# Patient Record
Sex: MALE | Race: WHITE | Employment: OTHER | URBAN - NONMETROPOLITAN AREA
[De-identification: names, ages, dates, MRNs, and addresses within clinical notes are randomized per-mention and may not be internally consistent; named-entity substitution may affect disease eponyms.]

---

## 2021-03-25 ENCOUNTER — OFFICE VISIT (OUTPATIENT)
Dept: FAMILY MEDICINE CLINIC | Age: 51
End: 2021-03-25
Payer: COMMERCIAL

## 2021-03-25 VITALS
OXYGEN SATURATION: 98 % | RESPIRATION RATE: 18 BRPM | BODY MASS INDEX: 25.48 KG/M2 | HEIGHT: 70 IN | DIASTOLIC BLOOD PRESSURE: 86 MMHG | WEIGHT: 178 LBS | TEMPERATURE: 98.4 F | SYSTOLIC BLOOD PRESSURE: 186 MMHG | HEART RATE: 88 BPM

## 2021-03-25 DIAGNOSIS — R39.11 URINARY HESITANCY: ICD-10-CM

## 2021-03-25 DIAGNOSIS — Z13.220 SCREENING CHOLESTEROL LEVEL: ICD-10-CM

## 2021-03-25 DIAGNOSIS — I10 HYPERTENSION, UNSPECIFIED TYPE: ICD-10-CM

## 2021-03-25 DIAGNOSIS — Z12.11 COLON CANCER SCREENING: ICD-10-CM

## 2021-03-25 DIAGNOSIS — Z11.59 NEED FOR HEPATITIS C SCREENING TEST: ICD-10-CM

## 2021-03-25 DIAGNOSIS — I63.89 AC CEREBRAL INFARCTION W/ ISCHEMIA (HCC): Primary | ICD-10-CM

## 2021-03-25 DIAGNOSIS — Z72.0 TOBACCO ABUSE: ICD-10-CM

## 2021-03-25 DIAGNOSIS — Z11.4 SCREENING FOR HIV (HUMAN IMMUNODEFICIENCY VIRUS): ICD-10-CM

## 2021-03-25 DIAGNOSIS — R29.898 WEAKNESS OF LEFT LOWER EXTREMITY: ICD-10-CM

## 2021-03-25 DIAGNOSIS — Z76.89 ENCOUNTER TO ESTABLISH CARE: ICD-10-CM

## 2021-03-25 DIAGNOSIS — R29.898 WEAKNESS OF LEFT UPPER EXTREMITY: ICD-10-CM

## 2021-03-25 PROCEDURE — 36415 COLL VENOUS BLD VENIPUNCTURE: CPT | Performed by: NURSE PRACTITIONER

## 2021-03-25 PROCEDURE — 1111F DSCHRG MED/CURRENT MED MERGE: CPT | Performed by: NURSE PRACTITIONER

## 2021-03-25 PROCEDURE — 99496 TRANSJ CARE MGMT HIGH F2F 7D: CPT | Performed by: NURSE PRACTITIONER

## 2021-03-25 RX ORDER — ASPIRIN 325 MG
TABLET ORAL
Qty: 30 TABLET | Refills: 5 | Status: SHIPPED | OUTPATIENT
Start: 2021-03-25 | End: 2021-04-08 | Stop reason: SDUPTHER

## 2021-03-25 RX ORDER — VARENICLINE TARTRATE 0.5 MG/1
.5-1 TABLET, FILM COATED ORAL SEE ADMIN INSTRUCTIONS
Qty: 57 TABLET | Refills: 0 | Status: SHIPPED | OUTPATIENT
Start: 2021-03-25 | End: 2021-07-15

## 2021-03-25 RX ORDER — VARENICLINE TARTRATE 0.5 MG/1
.5-1 TABLET, FILM COATED ORAL SEE ADMIN INSTRUCTIONS
Qty: 57 TABLET | Refills: 0 | Status: CANCELLED | OUTPATIENT
Start: 2021-03-25

## 2021-03-25 RX ORDER — NIFEDIPINE 60 MG/1
TABLET, EXTENDED RELEASE ORAL
COMMUNITY
Start: 2021-03-20 | End: 2021-04-08

## 2021-03-25 RX ORDER — ATORVASTATIN CALCIUM 40 MG/1
TABLET, FILM COATED ORAL
COMMUNITY
Start: 2021-03-20 | End: 2021-04-08 | Stop reason: SDUPTHER

## 2021-03-25 RX ORDER — ASPIRIN 325 MG/1
325 TABLET, COATED ORAL DAILY
Qty: 30 TABLET | Refills: 5 | Status: CANCELLED | OUTPATIENT
Start: 2021-03-25

## 2021-03-25 RX ORDER — ASPIRIN 325 MG/1
TABLET, COATED ORAL
COMMUNITY
Start: 2021-03-20 | End: 2021-03-25 | Stop reason: SDUPTHER

## 2021-03-25 RX ORDER — ATORVASTATIN CALCIUM 40 MG/1
40 TABLET, FILM COATED ORAL DAILY
Qty: 30 TABLET | Refills: 5 | Status: CANCELLED | OUTPATIENT
Start: 2021-03-25 | End: 2021-04-24

## 2021-03-25 RX ORDER — LISINOPRIL 20 MG/1
20 TABLET ORAL DAILY
Qty: 30 TABLET | Refills: 5 | Status: SHIPPED | OUTPATIENT
Start: 2021-03-25 | End: 2021-03-29

## 2021-03-25 ASSESSMENT — PATIENT HEALTH QUESTIONNAIRE - PHQ9
2. FEELING DOWN, DEPRESSED OR HOPELESS: 0
SUM OF ALL RESPONSES TO PHQ QUESTIONS 1-9: 0

## 2021-03-25 NOTE — PROGRESS NOTES
Post-Discharge Transitional Care Management Services or Hospital Follow Up      Jo-Ann Edwards   YOB: 1970    Date of Office Visit:  3/25/2021  Date of Hospital Admission: 3/18/21  Date of Hospital Discharge: 3/20/21    Care management risk score Rising risk (score 2-5) and Complex Care (Scores >=6): 0     Non face to face  following discharge, date last encounter closed (first attempt may have been earlier): *No documented post hospital discharge outreach found in the last 14 days     Call initiated 2 business days of discharge: *No response recorded in the last 14 days    Patient Active Problem List   Diagnosis    Ac cerebral infarction w/ ischemia ([facility] [address]    Hypertension    Tobacco abuse    Weakness of left lower extremity       No Known Allergies    Medications listed as ordered at the time of discharge from hospital  reviewed     Medications marked \"taking\" at this time  Outpatient Medications Marked as Taking for the 3/25/21 encounter (Office Visit) with Josue Jacobson, APRN - CNP   Medication Sig Dispense Refill    atorvastatin (LIPITOR) 40 MG tablet TAKE 1 TABLET BY MOUTH AT BEDTIME FOR 30 DAYS      NIFEdipine (PROCARDIA XL) 60 MG extended release tablet TAKE 1 TABLET BY MOUTH ONCE DAILY      varenicline (CHANTIX) 0.5 MG tablet Take 1-2 tablets by mouth See Admin Instructions 0.5mg DAILY for 3 days followed by 0.5mg TWICE DAILY for 4 days followed by 1mg TWICE DAILY 57 tablet 0    aspirin (EQ ASPIRIN) 325 MG tablet TAKE 1 TABLET BY MOUTH ONCE DAILY 30 tablet 5    Handicap Placard MISC by Does not apply route Expires in 5 years 1 each 0    [DISCONTINUED] lisinopril (PRINIVIL;ZESTRIL) 20 MG tablet Take 1 tablet by mouth daily 30 tablet 5        Medications patient taking as of now reconciled against medications ordered at time of hospital discharge: Yes    Chief Complaint   Patient presents with   D[facility] Doctor    [facility]- stroke 3/13/21 cough, chest tightness, shortness of breath and wheezing. Cardiovascular: Negative for chest pain, palpitations and leg swelling. Gastrointestinal: Negative for abdominal distention, abdominal pain, blood in stool, constipation, diarrhea, nausea, rectal pain and vomiting. Endocrine: Negative for cold intolerance and heat intolerance. Genitourinary: Negative for difficulty urinating, dysuria, frequency, hematuria, penile pain, scrotal swelling, testicular pain and urgency. Urinary hesitancy     Musculoskeletal: Positive for myalgias. Negative for arthralgias, back pain, gait problem and neck pain. Left leg weakness and left arm weakness. Skin: Negative for color change, rash and wound. Neurological: Negative for dizziness, seizures, weakness, light-headedness, numbness and headaches. Psychiatric/Behavioral: Negative for agitation and sleep disturbance. The patient is not nervous/anxious. Physical Exam  Vitals signs and nursing note reviewed. Constitutional:       General: He is not in acute distress. Appearance: Normal appearance. He is well-developed. He is not ill-appearing or diaphoretic. HENT:      Head: Normocephalic and atraumatic. Right Ear: Tympanic membrane and external ear normal. Tympanic membrane is not injected or erythematous. Left Ear: Tympanic membrane and external ear normal. Tympanic membrane is not injected or erythematous. Nose: Nose normal.      Mouth/Throat:      Mouth: Mucous membranes are moist.      Pharynx: Oropharynx is clear. Uvula midline. No oropharyngeal exudate or posterior oropharyngeal erythema. Eyes:      General:         Right eye: No discharge. Left eye: No discharge. Conjunctiva/sclera: Conjunctivae normal.      Pupils: Pupils are equal, round, and reactive to light. Neck:      Musculoskeletal: Full passive range of motion without pain, normal range of motion and neck supple.  No neck rigidity or muscular tenderness. Thyroid: No thyromegaly. Trachea: Trachea normal.   Cardiovascular:      Rate and Rhythm: Normal rate and regular rhythm. Pulses: Normal pulses. Heart sounds: Normal heart sounds, S1 normal and S2 normal. No murmur. No friction rub. No gallop. Pulmonary:      Effort: Pulmonary effort is normal. No respiratory distress. Breath sounds: Normal breath sounds. No wheezing or rales. Chest:      Chest wall: No tenderness. Abdominal:      General: Bowel sounds are normal. There is no distension. Palpations: Abdomen is soft. There is no mass. Tenderness: There is no abdominal tenderness. There is no guarding or rebound. Musculoskeletal: Normal range of motion. General: No swelling, tenderness or deformity. Lymphadenopathy:      Cervical: No cervical adenopathy. Skin:     General: Skin is warm and dry. Capillary Refill: Capillary refill takes less than 2 seconds. Neurological:      General: No focal deficit present. Mental Status: He is alert and oriented to person, place, and time. Motor: Weakness present. Coordination: Coordination is intact. Coordination normal.      Gait: Gait is intact. Deep Tendon Reflexes: Reflexes are normal and symmetric. Comments: Strength weakness to left arm and leg   Psychiatric:         Mood and Affect: Mood normal.         Behavior: Behavior normal.         Thought Content: Thought content normal.               Assessment/Plan:  1. Encounter to establish care      2. Ac cerebral infarction w/ ischemia (HCC)  On Lipitor, Asa.   - [place] DISCHARGE MEDS RECONCILED W/ CURRENT OUTPATIENT MED LIST  - External Referral To Physical Therapy  - External Referral To Occupational Therapy  - Handicap Placard [facility] by Does not apply route Expires in 5 years  Dispense: 1 each; Refill: 0    3.  Weakness of left lower extremity  Referral placed to PT/OT  - HI DISCHARGE MEDS RECONCILED W/ CURRENT OUTPATIENT MED LIST 4. Weakness of left upper extremity  Referral placed to PT/OT  - FL DISCHARGE MEDS RECONCILED W/ CURRENT OUTPATIENT MED LIST    5. Hypertension, unspecified type  Not controlled. 186/86 currently on Nifedipine 60 mg, adding Lisinopril 20 mg. Encouraged to quit smoking.   - FL DISCHARGE MEDS RECONCILED W/ CURRENT OUTPATIENT MED LIST    6. Tobacco abuse  Patient agreeable to try chantix. - FL DISCHARGE MEDS RECONCILED W/ CURRENT OUTPATIENT MED LIST  - varenicline (CHANTIX) 0.5 MG tablet; Take 1-2 tablets by mouth See Admin Instructions 0.5mg DAILY for 3 days followed by 0.5mg TWICE DAILY for 4 days followed by 1mg TWICE DAILY  Dispense: 57 tablet; Refill: 0    7. Colon cancer screening  - Cologuard (For External Results Only); Future    8. Screening for HIV (human immunodeficiency virus)  - HIV-1 and HIV-2 Antibodies; Future  - HIV-1 and HIV-2 Antibodies    9. Need for hepatitis C screening test  - Hepatitis C Antibody; Future  - Hepatitis C Antibody    10. Urinary hesitancy  - FL DISCHARGE MEDS RECONCILED W/ CURRENT OUTPATIENT MED LIST  - PSA Prostatic Specific Antigen; Future  - PSA Prostatic Specific Antigen    11. Screening cholesterol level  - Lipid, Fasting;  Future  - Lipid, Fasting        Medical Decision Making: moderate complexity

## 2021-03-25 NOTE — PATIENT INSTRUCTIONS
Patient Education      Adding Lisinopril 20 mg daily   High Blood Pressure: Care Instructions  Overview     It's normal for blood pressure to go up and down throughout the day. But if it stays up, you have high blood pressure. Another name for high blood pressure is hypertension. Despite what a lot of people think, high blood pressure usually doesn't cause headaches or make you feel dizzy or lightheaded. It usually has no symptoms. But it does increase your risk of stroke, heart attack, and other problems. You and your doctor will talk about your risks of these problems based on your blood pressure. Your doctor will give you a goal for your blood pressure. Your goal will be based on your health and your age. Lifestyle changes, such as eating healthy and being active, are always important to help lower blood pressure. You might also take medicine to reach your blood pressure goal.  Follow-up care is a key part of your treatment and safety. Be sure to make and go to all appointments, and call your doctor if you are having problems. It's also a good idea to know your test results and keep a list of the medicines you take. How can you care for yourself at home? Medical treatment  · If you stop taking your medicine, your blood pressure will go back up. You may take one or more types of medicine to lower your blood pressure. Be safe with medicines. Take your medicine exactly as prescribed. Call your doctor if you think you are having a problem with your medicine. · Talk to your doctor before you start taking aspirin every day. Aspirin can help certain people lower their risk of a heart attack or stroke. But taking aspirin isn't right for everyone, because it can cause serious bleeding. · See your doctor regularly. You may need to see the doctor more often at first or until your blood pressure comes down.   · If you are taking blood pressure medicine, talk to your doctor before you take decongestants or anti-inflammatory medicine, such as ibuprofen. Some of these medicines can raise blood pressure. · Learn how to check your blood pressure at home. Lifestyle changes  · Stay at a healthy weight. This is especially important if you put on weight around the waist. Losing even 10 pounds can help you lower your blood pressure. · If your doctor recommends it, get more exercise. Walking is a good choice. Bit by bit, increase the amount you walk every day. Try for at least 30 minutes on most days of the week. You also may want to swim, bike, or do other activities. · Avoid or limit alcohol. Talk to your doctor about whether you can drink any alcohol. · Try to limit how much sodium you eat to less than 2,300 milligrams (mg) a day. Your doctor may ask you to try to eat less than 1,500 mg a day. · Eat plenty of fruits (such as bananas and oranges), vegetables, legumes, whole grains, and low-fat dairy products. · Lower the amount of saturated fat in your diet. Saturated fat is found in animal products such as milk, cheese, and meat. Limiting these foods may help you lose weight and also lower your risk for heart disease. · Do not smoke. Smoking increases your risk for heart attack and stroke. If you need help quitting, talk to your doctor about stop-smoking programs and medicines. These can increase your chances of quitting for good. When should you call for help? Call  911 anytime you think you may need emergency care. This may mean having symptoms that suggest that your blood pressure is causing a serious heart or blood vessel problem. Your blood pressure may be over 180/120. For example, call 911 if:    · You have symptoms of a heart attack. These may include:  ? Chest pain or pressure, or a strange feeling in the chest.  ? Sweating. ? Shortness of breath. ? Nausea or vomiting. ? Pain, pressure, or a strange feeling in the back, neck, jaw, or upper belly or in one or both shoulders or arms.   ? Lightheadedness pressure? Blood pressure is a measure of how hard the blood pushes against the walls of your arteries. It's normal for blood pressure to go up and down throughout the day. But if it stays up, you have high blood pressure. Another name for high blood pressure is hypertension. Two numbers tell you your blood pressure. The first number is the systolic pressure (top number). It shows how hard the blood pushes when your heart is pumping. The second number is the diastolic pressure (bottom number). It shows how hard the blood pushes between heartbeats, when your heart is relaxed and filling with blood. Your doctor will give you a goal for your blood pressure based on your health and your age. High blood pressure (hypertension) means that the top number stays high, or the bottom number stays high, or both. High blood pressure increases the risk of stroke, heart attack, and other problems. What happens when you have high blood pressure? · Blood flows through your arteries with too much force. Over time, this can damage the heart and the walls of your arteries. But you can't feel it. High blood pressure usually doesn't cause symptoms. · High blood pressure makes your heart work harder. And that can lead to heart failure, which means your heart doesn't pump as much blood as your body needs. · Fat and calcium start to build up in your arteries. This buildup is called hardening of the arteries. It can cause many problems including a heart attack and stroke. · Arteries also carry blood and oxygen to organs like your eyes, kidneys, and brain. If high blood pressure damages those arteries, it can lead to vision loss, kidney disease, stroke, and a higher risk of dementia. How can you prevent high blood pressure? · Stay at a healthy weight. · Try to limit how much sodium you eat to less than 2,300 milligrams (mg) a day. If you limit your sodium to 1,500 mg a day, you can lower your blood pressure even more.   ? Buy foods that are labeled \"unsalted,\" \"sodium-free,\" or \"low-sodium. \" Foods labeled \"reduced-sodium\" and \"light sodium\" may still have too much sodium. ? Flavor your food with garlic, lemon juice, onion, vinegar, herbs, and spices instead of salt. Do not use soy sauce, steak sauce, onion salt, garlic salt, mustard, or ketchup on your food. ? Use less salt (or none) when recipes call for it. You can often use half the salt a recipe calls for without losing flavor. · Be physically active. Get at least 30 minutes of exercise on most days of the week. Walking is a good choice. You also may want to do other activities, such as running, swimming, cycling, or playing tennis or team sports. · Limit alcohol to 2 drinks a day for men and 1 drink a day for women. · Eat plenty of fruits, vegetables, and low-fat dairy products. Eat less saturated and total fats. How is high blood pressure treated? · Your doctor will suggest making lifestyle changes to help your heart. For example, your doctor may ask you to eat healthy foods, quit smoking, lose extra weight, and be more active. · If lifestyle changes don't help enough, your doctor may recommend that you take medicine. · When blood pressure is very high, medicines are needed to lower it. Follow-up care is a key part of your treatment and safety. Be sure to make and go to all appointments, and call your doctor if you are having problems. It's also a good idea to know your test results and keep a list of the medicines you take. Where can you learn more? Go to https://disco volantepepiceweb.[employer]. org and sign in to your [employer] account. Enter P501 in the [facility] box to learn more about \"Learning About High Blood Pressure. \"     If you do not have an account, please click on the \"Sign Up Now\" link. Current as of: August 31, 2020               Content Version: 12.8  © 4427-8698 Healthwise, Incorporated.    Care instructions adapted under license by [facility]. If you have questions about a medical condition or this instruction, always ask your healthcare professional. [place] [address] any warranty or liability for your use of this information.

## 2021-03-26 LAB
CHOLESTEROL, FASTING: 111 MG/DL (ref 100–199)
HDLC SERPL-MCNC: 50 MG/DL
HEPATITIS C ANTIBODY: NEGATIVE
LDL CHOLESTEROL CALCULATED: 51 MG/DL
PROSTATE SPECIFIC ANTIGEN: 0.66 NG/ML (ref 0–1)
TRIGLYCERIDE, FASTING: 51 MG/DL (ref 0–199)

## 2021-03-27 LAB — HIV AG/AB: NONREACTIVE

## 2021-03-29 ENCOUNTER — TELEPHONE (OUTPATIENT)
Dept: FAMILY MEDICINE CLINIC | Age: 51
End: 2021-03-29

## 2021-03-29 DIAGNOSIS — I10 HYPERTENSION, UNSPECIFIED TYPE: ICD-10-CM

## 2021-03-29 DIAGNOSIS — K59.00 CONSTIPATION, UNSPECIFIED CONSTIPATION TYPE: Primary | ICD-10-CM

## 2021-03-29 RX ORDER — POLYETHYLENE GLYCOL 3350 17 G/17G
17 POWDER, FOR SOLUTION ORAL DAILY PRN
Qty: 527 G | Refills: 1 | Status: SHIPPED | OUTPATIENT
Start: 2021-03-29 | End: 2021-04-14

## 2021-03-29 RX ORDER — LISINOPRIL 40 MG/1
40 TABLET ORAL DAILY
Qty: 30 TABLET | Refills: 5 | Status: SHIPPED | OUTPATIENT
Start: 2021-03-29 | End: 2021-04-08 | Stop reason: SDUPTHER

## 2021-03-29 NOTE — TELEPHONE ENCOUNTER
Patient notified and voiced understanding- he states that he will have to have meds sent to mail in but he is not sure which one- will let us know

## 2021-03-29 NOTE — TELEPHONE ENCOUNTER
Regarding his blood pressures. I am wanting to increase his Lisinopril from 20 to 40 mg. He has 20 mg tablets at home. He can use those up and take two of those at once to equal 40 mg.   I am also sending a new Rx for the 40 mg.

## 2021-03-29 NOTE — TELEPHONE ENCOUNTER
Had small hard walnut size BM yesterday morning- advised of miralax Rx being sent in- advised to call with abdominal pain or any worsening symptoms voiced understanding - will call Thursday with an update

## 2021-03-29 NOTE — TELEPHONE ENCOUNTER
Spoke with the patient who states he would like to try a medication for constipation. The patient would like it sent to [facility] in [place].  Please advise  [phone]

## 2021-03-29 NOTE — TELEPHONE ENCOUNTER
Recent BP readings     3/25/21  7:43 179/92 pulse 91                2:24 175/91 pulse 88    3/26/21  12:24 am 142/74 pulse 86                8:58 am 187/93 pulse 90                11:06 pm 156/79 pulse       3/27/21   7:30 am  166/86 pulse 88                 2:49 pm  176/91 pulse 82                 11:30 pm  149/77 pulse 78      3/28/21  9:18 am 172/82  Pulse 94                3:20 pm 152/101  Pulse 82                 9:59 pm 159/84  Pulse 88    3/29/21 9:08 am  179/87  Pulse 87                12 midnight 189/98  Pulse 85

## 2021-03-29 NOTE — RESULT ENCOUNTER NOTE
Please notify Kanika Herrera that his labs all looked good. Also please ask him what his blood pressure has been over the last few days since we added the additional medication.

## 2021-03-29 NOTE — TELEPHONE ENCOUNTER
When was his last BM? Is he passing gas? Having any type BM, small hard? I sent an Rx for MiraLax, but can do something stronger if needed.

## 2021-03-30 ENCOUNTER — TELEPHONE (OUTPATIENT)
Dept: FAMILY MEDICINE CLINIC | Age: 51
End: 2021-03-30

## 2021-03-30 NOTE — TELEPHONE ENCOUNTER
NWPT at [facility] called, they do not have OT in [place].  Pt does not want to travel to [place] or [facility] [place] and refused the referral. Pt has follow up apt on 04/08/21

## 2021-03-31 ASSESSMENT — ENCOUNTER SYMPTOMS
CHEST TIGHTNESS: 0
EYE PAIN: 0
FACIAL SWELLING: 0
SINUS PRESSURE: 0
SHORTNESS OF BREATH: 0
SINUS PAIN: 0
SORE THROAT: 0
DIARRHEA: 0
EYE DISCHARGE: 0
EYE REDNESS: 0
RECTAL PAIN: 0
NAUSEA: 0
TROUBLE SWALLOWING: 0
COLOR CHANGE: 0
BACK PAIN: 0
WHEEZING: 0
ABDOMINAL PAIN: 0
CONSTIPATION: 0
BLOOD IN STOOL: 0
VOMITING: 0
COUGH: 0
ABDOMINAL DISTENTION: 0
EYE ITCHING: 0

## 2021-04-01 ENCOUNTER — TELEPHONE (OUTPATIENT)
Dept: FAMILY MEDICINE CLINIC | Age: 51
End: 2021-04-01

## 2021-04-05 NOTE — TELEPHONE ENCOUNTER
Patient will email his BP readings. Patient stated he has had very little BM. Stated he is coming in for appointment on Thursday. Denied any other questions or concerns at this time.

## 2021-04-08 ENCOUNTER — OFFICE VISIT (OUTPATIENT)
Dept: FAMILY MEDICINE CLINIC | Age: 51
End: 2021-04-08
Payer: COMMERCIAL

## 2021-04-08 VITALS
DIASTOLIC BLOOD PRESSURE: 78 MMHG | HEART RATE: 80 BPM | TEMPERATURE: 98.2 F | SYSTOLIC BLOOD PRESSURE: 134 MMHG | RESPIRATION RATE: 16 BRPM | OXYGEN SATURATION: 100 %

## 2021-04-08 DIAGNOSIS — I10 HYPERTENSION, UNSPECIFIED TYPE: Primary | ICD-10-CM

## 2021-04-08 DIAGNOSIS — I63.89 AC CEREBRAL INFARCTION W/ ISCHEMIA (HCC): ICD-10-CM

## 2021-04-08 DIAGNOSIS — R10.84 GENERALIZED ABDOMINAL PAIN: ICD-10-CM

## 2021-04-08 DIAGNOSIS — Z13.1 DIABETES MELLITUS SCREENING: ICD-10-CM

## 2021-04-08 PROCEDURE — 99214 OFFICE O/P EST MOD 30 MIN: CPT | Performed by: NURSE PRACTITIONER

## 2021-04-08 PROCEDURE — 36415 COLL VENOUS BLD VENIPUNCTURE: CPT | Performed by: NURSE PRACTITIONER

## 2021-04-08 RX ORDER — ASPIRIN 325 MG
TABLET ORAL
Qty: 90 TABLET | Refills: 3 | Status: SHIPPED | OUTPATIENT
Start: 2021-04-08 | End: 2021-06-17 | Stop reason: SDUPTHER

## 2021-04-08 RX ORDER — NIFEDIPINE 90 MG/1
90 TABLET, EXTENDED RELEASE ORAL DAILY
Qty: 90 TABLET | Refills: 3 | Status: ON HOLD | OUTPATIENT
Start: 2021-04-08 | End: 2021-08-09 | Stop reason: HOSPADM

## 2021-04-08 RX ORDER — NIFEDIPINE 90 MG/1
90 TABLET, FILM COATED, EXTENDED RELEASE ORAL DAILY
Qty: 30 TABLET | Refills: 0 | Status: SHIPPED | OUTPATIENT
Start: 2021-04-08 | End: 2021-05-06

## 2021-04-08 RX ORDER — ATORVASTATIN CALCIUM 40 MG/1
40 TABLET, FILM COATED ORAL DAILY
Qty: 90 TABLET | Refills: 3 | Status: SHIPPED | OUTPATIENT
Start: 2021-04-08 | End: 2021-06-17 | Stop reason: SINTOL

## 2021-04-08 RX ORDER — LISINOPRIL 40 MG/1
40 TABLET ORAL DAILY
Qty: 90 TABLET | Refills: 3 | Status: ON HOLD | OUTPATIENT
Start: 2021-04-08 | End: 2021-08-05

## 2021-04-08 SDOH — ECONOMIC STABILITY: INCOME INSECURITY: HOW HARD IS IT FOR YOU TO PAY FOR THE VERY BASICS LIKE FOOD, HOUSING, MEDICAL CARE, AND HEATING?: SOMEWHAT HARD

## 2021-04-08 NOTE — PROGRESS NOTES
Constitutional: Negative for activity change, appetite change, fatigue, fever and unexpected weight change. HENT: Negative for congestion, dental problem, ear pain, facial swelling, mouth sores, postnasal drip, sinus pressure, sinus pain, sore throat and trouble swallowing. Eyes: Negative for pain, discharge, redness, itching and visual disturbance. Respiratory: Negative for cough, chest tightness, shortness of breath and wheezing. Cardiovascular: Negative for chest pain, palpitations and leg swelling. Gastrointestinal: Positive for abdominal pain (generalized). Negative for abdominal distention, blood in stool, constipation, diarrhea, nausea, rectal pain and vomiting. Endocrine: Negative for cold intolerance and heat intolerance. Genitourinary: Negative for difficulty urinating, dysuria, frequency, hematuria, penile pain, scrotal swelling, testicular pain and urgency. Musculoskeletal: Positive for gait problem (d/t left sided weakness). Negative for arthralgias, back pain and neck pain. Skin: Negative for color change, rash and wound. Neurological: Positive for weakness (improving). Negative for dizziness, seizures, light-headedness, numbness and headaches. Psychiatric/Behavioral: Negative for agitation and sleep disturbance. Physical Exam  Vitals signs and nursing note reviewed. Constitutional:       General: He is not in acute distress. Appearance: Normal appearance. He is well-developed. He is not diaphoretic. HENT:      Head: Normocephalic and atraumatic. Right Ear: Tympanic membrane and external ear normal. Tympanic membrane is not injected or erythematous. Left Ear: Tympanic membrane and external ear normal. Tympanic membrane is not injected or erythematous. Nose: Nose normal.      Mouth/Throat:      Pharynx: Uvula midline. Eyes:      General:         Right eye: No discharge. Left eye: No discharge.       Conjunctiva/sclera: Conjunctivae normal. Pupils: Pupils are equal, round, and reactive to light. Neck:      Musculoskeletal: Full passive range of motion without pain, normal range of motion and neck supple. No neck rigidity or muscular tenderness. Thyroid: No thyromegaly. Trachea: Trachea normal.   Cardiovascular:      Rate and Rhythm: Normal rate and regular rhythm. Pulses: Normal pulses. Heart sounds: Normal heart sounds, S1 normal and S2 normal. No murmur. No friction rub. No gallop. Pulmonary:      Effort: Pulmonary effort is normal. No respiratory distress. Breath sounds: Normal breath sounds. No wheezing or rales. Chest:      Chest wall: No tenderness. Abdominal:      General: Abdomen is flat. Bowel sounds are normal. There is no distension. Palpations: Abdomen is soft. There is no mass. Tenderness: There is generalized abdominal tenderness. There is no guarding or rebound. Musculoskeletal:      Comments: Using walker, due to left sided weakness. Lymphadenopathy:      Cervical: No cervical adenopathy. Skin:     General: Skin is warm and dry. Capillary Refill: Capillary refill takes less than 2 seconds. Neurological:      Mental Status: He is alert and oriented to person, place, and time. Gait: Gait abnormal.      Deep Tendon Reflexes: Reflexes are normal and symmetric. Psychiatric:         Mood and Affect: Mood normal.         Behavior: Behavior normal.               An electronic signature was used to authenticate this note.     --RACHEL Beltran - CNP

## 2021-04-09 LAB
ANION GAP SERPL CALCULATED.3IONS-SCNC: 16 MEQ/L (ref 8–16)
AVERAGE GLUCOSE: 123 MG/DL (ref 70–126)
BUN BLDV-MCNC: 9 MG/DL (ref 7–22)
CALCIUM SERPL-MCNC: 10.3 MG/DL (ref 8.5–10.5)
CHLORIDE BLD-SCNC: 84 MEQ/L (ref 98–111)
CO2: 22 MEQ/L (ref 23–33)
CREAT SERPL-MCNC: 0.8 MG/DL (ref 0.4–1.2)
GFR SERPL CREATININE-BSD FRML MDRD: > 90 ML/MIN/1.73M2
GLUCOSE BLD-MCNC: 109 MG/DL (ref 70–108)
HBA1C MFR BLD: 6.1 % (ref 4.4–6.4)
POTASSIUM SERPL-SCNC: 4.7 MEQ/L (ref 3.5–5.2)
SODIUM BLD-SCNC: 122 MEQ/L (ref 135–145)

## 2021-04-13 ENCOUNTER — TELEPHONE (OUTPATIENT)
Dept: FAMILY MEDICINE CLINIC | Age: 51
End: 2021-04-13

## 2021-04-14 DIAGNOSIS — E87.1 HYPONATREMIA: Primary | ICD-10-CM

## 2021-04-14 RX ORDER — POLYETHYLENE GLYCOL 3350 17 G/DOSE
POWDER (GRAM) ORAL
Status: ON HOLD | COMMUNITY
Start: 2021-03-29 | End: 2021-08-05

## 2021-04-14 ASSESSMENT — ENCOUNTER SYMPTOMS
CHEST TIGHTNESS: 0
TROUBLE SWALLOWING: 0
NAUSEA: 0
FACIAL SWELLING: 0
EYE REDNESS: 0
COUGH: 0
WHEEZING: 0
RECTAL PAIN: 0
VOMITING: 0
SORE THROAT: 0
BLOOD IN STOOL: 0
ABDOMINAL PAIN: 1
COLOR CHANGE: 0
EYE DISCHARGE: 0
EYE PAIN: 0
ABDOMINAL DISTENTION: 0
CONSTIPATION: 0
DIARRHEA: 0
SHORTNESS OF BREATH: 0
BACK PAIN: 0
SINUS PRESSURE: 0
SINUS PAIN: 0
EYE ITCHING: 0

## 2021-04-15 LAB
BUN BLDV-MCNC: 8 MG/DL
CALCIUM SERPL-MCNC: 9.5 MG/DL
CHLORIDE BLD-SCNC: 87 MMOL/L
CO2: 24 MMOL/L
CREAT SERPL-MCNC: 0.7 MG/DL
GFR CALCULATED: >60
GLUCOSE BLD-MCNC: 145 MG/DL
MAGNESIUM: 1.8 MG/DL
POTASSIUM SERPL-SCNC: 4.1 MMOL/L
SODIUM BLD-SCNC: 122 MMOL/L

## 2021-04-16 NOTE — RESULT ENCOUNTER NOTE
Please call Elizabeth Hoover and notify him that I need him to go to the ER for evaluation. He can start at [facility] if he would like too. This is due to his very low sodium and chloride. I will call ER report. He responds better by Neri Justin.

## 2021-04-17 LAB
ALBUMIN SERPL-MCNC: 4.5 G/DL
ALP BLD-CCNC: 68 U/L
ALT SERPL-CCNC: 44 U/L
ANION GAP SERPL CALCULATED.3IONS-SCNC: 18 MMOL/L
AST SERPL-CCNC: 45 U/L
BASOPHILS ABSOLUTE: 0.1 /ΜL
BASOPHILS RELATIVE PERCENT: 1 %
BILIRUB SERPL-MCNC: 0.8 MG/DL (ref 0.1–1.4)
BUN BLDV-MCNC: 7 MG/DL
CALCIUM SERPL-MCNC: 9.8 MG/DL
CHLORIDE BLD-SCNC: 86 MMOL/L
CO2: 23 MMOL/L
CREAT SERPL-MCNC: 0.69 MG/DL
EOSINOPHILS ABSOLUTE: 0.1 /ΜL
EOSINOPHILS RELATIVE PERCENT: 2.3 %
GFR CALCULATED: >60
GLUCOSE BLD-MCNC: 126 MG/DL
HCT VFR BLD CALC: 38.2 % (ref 41–53)
HEMOGLOBIN: 14.1 G/DL (ref 13.5–17.5)
LYMPHOCYTES ABSOLUTE: 1.7 /ΜL
LYMPHOCYTES RELATIVE PERCENT: 33.2 %
MCH RBC QN AUTO: 36.7 PG
MCHC RBC AUTO-ENTMCNC: 37 G/DL
MCV RBC AUTO: 99.2 FL
MONOCYTES ABSOLUTE: 0.4 /ΜL
MONOCYTES RELATIVE PERCENT: 8.9 %
NEUTROPHILS ABSOLUTE: 2.8 /ΜL
NEUTROPHILS RELATIVE PERCENT: 54.6 %
PDW BLD-RTO: 12.2 %
PLATELET # BLD: 203 K/ΜL
PMV BLD AUTO: 6.6 FL
POTASSIUM SERPL-SCNC: 4.2 MMOL/L
RBC # BLD: 3.85 10^6/ΜL
SODIUM BLD-SCNC: 123 MMOL/L
TOTAL PROTEIN: 8.2
WBC # BLD: 5.1 10^3/ML

## 2021-04-21 DIAGNOSIS — E87.1 HYPONATREMIA: Primary | ICD-10-CM

## 2021-04-21 NOTE — RESULT ENCOUNTER NOTE
Patient did actually go to the ER and received IV fluids. He should have his levels rechecked though. I will put the order in.

## 2021-04-22 ENCOUNTER — NURSE ONLY (OUTPATIENT)
Dept: FAMILY MEDICINE CLINIC | Age: 51
End: 2021-04-22
Payer: COMMERCIAL

## 2021-04-22 DIAGNOSIS — E87.1 HYPONATREMIA: ICD-10-CM

## 2021-04-22 DIAGNOSIS — E87.1 HYPONATREMIA: Primary | ICD-10-CM

## 2021-04-22 PROCEDURE — 36415 COLL VENOUS BLD VENIPUNCTURE: CPT | Performed by: NURSE PRACTITIONER

## 2021-04-22 NOTE — PROGRESS NOTES
Please inform patient that I would like a urine sample when he comes to get the labs. Also we will likely be chasing this low sodium constantly unless he cuts out the alcohol. I can talk to him.

## 2021-04-22 NOTE — RESULT ENCOUNTER NOTE
Please inform patient that I would like a urine sample when he comes to get the labs. Also we will likely be chasing this low sodium constantly unless he cuts out the alcohol. I can talk to him if available when you call.

## 2021-04-23 ENCOUNTER — TELEPHONE (OUTPATIENT)
Dept: FAMILY MEDICINE CLINIC | Age: 51
End: 2021-04-23

## 2021-04-23 LAB
ANION GAP SERPL CALCULATED.3IONS-SCNC: 15 MEQ/L (ref 8–16)
BUN BLDV-MCNC: 7 MG/DL (ref 7–22)
CALCIUM SERPL-MCNC: 9.9 MG/DL (ref 8.5–10.5)
CHLORIDE BLD-SCNC: 88 MEQ/L (ref 98–111)
CO2: 23 MEQ/L (ref 23–33)
CREAT SERPL-MCNC: 0.7 MG/DL (ref 0.4–1.2)
GFR SERPL CREATININE-BSD FRML MDRD: > 90 ML/MIN/1.73M2
GLUCOSE BLD-MCNC: 128 MG/DL (ref 70–108)
MAGNESIUM: 2 MG/DL (ref 1.6–2.4)
OSMOLALITY: 295 MOSMOL/KG (ref 275–295)
POTASSIUM SERPL-SCNC: 4.3 MEQ/L (ref 3.5–5.2)
SODIUM BLD-SCNC: 126 MEQ/L (ref 135–145)

## 2021-04-23 NOTE — TELEPHONE ENCOUNTER
----- Message from Ailyn Moses sent at 4/22/2021  4:43 PM EDT -----  Subject: Message to Provider    QUESTIONS  Information for Provider? patient was prescribed a medication from another   doctor and had questions in regards to it   ---------------------------------------------------------------------------  --------------  [address]  What is the best way for the office to contact you? OK to leave message on   voicemail  Preferred Call Back Phone Number? [phone]  ---------------------------------------------------------------------------  --------------  SCRIPT ANSWERS  Relationship to Patient?  Self

## 2021-04-23 NOTE — TELEPHONE ENCOUNTER
The steroid may temporally effect his blood pressure, so just keep an eye on it, and call Monday if the numbers seem to be rising.

## 2021-04-24 LAB
OSMOLALITY URINE: 223 MOSMOL/KG (ref 250–750)
SODIUM URINE: 51 MEQ/L

## 2021-04-29 ENCOUNTER — TELEPHONE (OUTPATIENT)
Dept: FAMILY MEDICINE CLINIC | Age: 51
End: 2021-04-29

## 2021-04-29 DIAGNOSIS — I63.89 AC CEREBRAL INFARCTION W/ ISCHEMIA (HCC): ICD-10-CM

## 2021-04-29 NOTE — TELEPHONE ENCOUNTER
----- Message from Ghislaine Mcdowell sent at 4/29/2021  9:07 AM EDT -----  Subject: Message to Provider    QUESTIONS  Information for Provider? Patient has called requesting a form to get a   2nd handicap placard. He will come by around 12 to   ---------------------------------------------------------------------------  --------------  [address]  What is the best way for the office to contact you? OK to leave message on   voicemail  Preferred Call Back Phone Number? [phone]  ---------------------------------------------------------------------------  --------------  SCRIPT ANSWERS  Relationship to Patient?  Self

## 2021-05-06 ENCOUNTER — OFFICE VISIT (OUTPATIENT)
Dept: FAMILY MEDICINE CLINIC | Age: 51
End: 2021-05-06
Payer: COMMERCIAL

## 2021-05-06 VITALS
OXYGEN SATURATION: 97 % | WEIGHT: 173 LBS | SYSTOLIC BLOOD PRESSURE: 160 MMHG | DIASTOLIC BLOOD PRESSURE: 80 MMHG | RESPIRATION RATE: 18 BRPM | HEART RATE: 77 BPM | BODY MASS INDEX: 24.77 KG/M2 | HEIGHT: 70 IN

## 2021-05-06 DIAGNOSIS — Z72.0 TOBACCO ABUSE: ICD-10-CM

## 2021-05-06 DIAGNOSIS — M54.41 CHRONIC RIGHT-SIDED LOW BACK PAIN WITH RIGHT-SIDED SCIATICA: ICD-10-CM

## 2021-05-06 DIAGNOSIS — I10 HYPERTENSION, UNSPECIFIED TYPE: Primary | ICD-10-CM

## 2021-05-06 DIAGNOSIS — G89.29 CHRONIC RIGHT-SIDED LOW BACK PAIN WITH RIGHT-SIDED SCIATICA: ICD-10-CM

## 2021-05-06 PROCEDURE — 99214 OFFICE O/P EST MOD 30 MIN: CPT | Performed by: NURSE PRACTITIONER

## 2021-05-06 RX ORDER — LIDOCAINE 50 MG/G
1 PATCH TOPICAL DAILY
Qty: 30 PATCH | Refills: 3 | Status: SHIPPED | OUTPATIENT
Start: 2021-05-06 | End: 2021-06-05

## 2021-05-06 ASSESSMENT — ENCOUNTER SYMPTOMS
VOMITING: 0
EYE REDNESS: 0
DIARRHEA: 0
EYE DISCHARGE: 0
TROUBLE SWALLOWING: 0
CHEST TIGHTNESS: 0
EYE ITCHING: 0
FACIAL SWELLING: 0
SHORTNESS OF BREATH: 0
ABDOMINAL PAIN: 0
BACK PAIN: 1
NAUSEA: 0
ABDOMINAL DISTENTION: 0
BLOOD IN STOOL: 0
COUGH: 0
SORE THROAT: 0
COLOR CHANGE: 0
SINUS PAIN: 0
SINUS PRESSURE: 0
EYE PAIN: 0
WHEEZING: 0
CONSTIPATION: 0
RECTAL PAIN: 0

## 2021-05-06 NOTE — PROGRESS NOTES
[address] [address]  Dept: [phone]  Loc: 222 [facility] (:  1970) is a 46 y.o. male,Established patient, here for evaluation of the following chief complaint(s):  Hypertension (1 month follow up ), Fatigue, Back Pain (cancelled epidural - ), and Discuss Medications (stopped lipitor 21)      ASSESSMENT/PLAN:  1. Hypertension, unspecified type  160/80 when reviewing the log provided, the numbers average around 140/70. May be elevated today d/t pain. 2. Tobacco abuse  Encouraged cessation, but he has not stared the Chantix yet. 3. Chronic right-sided low back pain with right-sided sciatica  -     lidocaine (LIDODERM) 5 %; Place 1 patch onto the skin daily 12 hours on, 12 hours off., Disp-30 patch, R-3Normal  Encouraged to continue with therapy, will reach out to see if different therapies can be offered to assist with pain. Also sending for lidocaine patches. Return in about 6 weeks (around 2021) for htn f/u, call office in 2 weeks with b/p readings. .    SUBJECTIVE/OBJECTIVE:  160/80    Was supposed to have epidual, it was canceled due to needing PT. Walking aggravates the pain, but hurts even at night. BFR today, dry needleing, stim therapy? \Jim therapist       64 ounces of water daily    Instructed to add sodium (salt)     Decreasing alcohol. 140/85 manual right arm    176/95 pt cuff. Tried OTC bengay and Bio Freeze no relief. Doing PT currently. But not able to do a lot due to his pain. Also when asked he states he adverse reaction was never reported to the CDC      170/80    Average numbers per patient info 158/85  Pain is worse.      has a past medical history of Essential hypertension and Stroke ([facility] [address] Review of Systems   Constitutional: Negative for activity change, appetite change, fatigue, fever and unexpected weight change.    HENT: Negative for congestion, dental problem, ear pain, facial swelling, mouth sores, postnasal drip, sinus pressure, sinus pain, sore throat and trouble swallowing. Eyes: Negative for pain, discharge, redness, itching and visual disturbance. Respiratory: Negative for cough, chest tightness, shortness of breath and wheezing. Cardiovascular: Negative for chest pain, palpitations and leg swelling. Gastrointestinal: Negative for abdominal distention, abdominal pain, blood in stool, constipation, diarrhea, nausea, rectal pain and vomiting. Endocrine: Negative for cold intolerance and heat intolerance. Genitourinary: Negative for difficulty urinating, dysuria, frequency, hematuria, penile pain, scrotal swelling, testicular pain and urgency. Musculoskeletal: Positive for back pain (chronic). Negative for arthralgias, gait problem and neck pain. Skin: Negative for color change, rash and wound. Neurological: Negative for dizziness, seizures, weakness, light-headedness, numbness and headaches. Psychiatric/Behavioral: Negative for agitation and sleep disturbance. Physical Exam  Vitals signs and nursing note reviewed. Constitutional:       General: He is not in acute distress. Appearance: Normal appearance. He is well-developed. He is not diaphoretic. HENT:      Head: Normocephalic and atraumatic. Right Ear: Tympanic membrane and external ear normal. Tympanic membrane is not injected or erythematous. Left Ear: Tympanic membrane and external ear normal. Tympanic membrane is not injected or erythematous. Nose: Nose normal.      Mouth/Throat:      Pharynx: Uvula midline. Eyes:      General:         Right eye: No discharge. Left eye: No discharge. Conjunctiva/sclera: Conjunctivae normal.      Pupils: Pupils are equal, round, and reactive to light. Neck:      Musculoskeletal: Full passive range of motion without pain, normal range of motion and neck supple. Thyroid: No thyromegaly. Trachea: Trachea normal.   Cardiovascular:      Rate and Rhythm: Normal rate and regular rhythm. Pulses: Normal pulses. Heart sounds: Normal heart sounds, S1 normal and S2 normal.   Pulmonary:      Effort: Pulmonary effort is normal. No respiratory distress. Breath sounds: Normal breath sounds. No wheezing or rales. Chest:      Chest wall: No tenderness. Abdominal:      General: Bowel sounds are normal. There is no distension. Palpations: Abdomen is soft. There is no mass. Tenderness: There is no abdominal tenderness. There is no guarding or rebound. Musculoskeletal: Normal range of motion. General: Tenderness present. No deformity. Comments: Mid and low back pain, decreased ROM due to pain    Lymphadenopathy:      Cervical: No cervical adenopathy. Skin:     General: Skin is warm and dry. Capillary Refill: Capillary refill takes less than 2 seconds. Neurological:      General: No focal deficit present. Mental Status: He is alert. Motor: Weakness (left arm weakness) present. Deep Tendon Reflexes: Reflexes are normal and symmetric. Psychiatric:         Mood and Affect: Mood normal.         Behavior: Behavior normal.             An electronic signature was used to authenticate this note.     --Jayne Sandifer, RACHEL - CNP

## 2021-05-11 ENCOUNTER — TELEPHONE (OUTPATIENT)
Dept: FAMILY MEDICINE CLINIC | Age: 51
End: 2021-05-11

## 2021-05-12 NOTE — TELEPHONE ENCOUNTER
Goodrx price ranges from $47-$83 with [facility] being the cheapest for this rx.

## 2021-05-14 ENCOUNTER — TELEPHONE (OUTPATIENT)
Dept: FAMILY MEDICINE CLINIC | Age: 51
End: 2021-05-14

## 2021-05-14 NOTE — TELEPHONE ENCOUNTER
Spoke with Topher Flowers at [facility] at [facility]- she will have PT give you a call- cell number given since unsure when she would be able to call

## 2021-05-14 NOTE — TELEPHONE ENCOUNTER
----- Message from RACHEL Sutton CNP sent at 5/13/2021  4:48 PM EDT -----  Can we please call [facility] at [facility] and see if the therapist seeing Amilcar Nicely can give me a call.    Thank you

## 2021-05-14 NOTE — TELEPHONE ENCOUNTER
Spoke with [facility], she said they added electrical stim and he seemed to do better at therapy with this added.

## 2021-05-21 ENCOUNTER — TELEPHONE (OUTPATIENT)
Dept: FAMILY MEDICINE CLINIC | Age: 51
End: 2021-05-21

## 2021-05-21 DIAGNOSIS — R53.83 FATIGUE, UNSPECIFIED TYPE: Primary | ICD-10-CM

## 2021-05-24 NOTE — TELEPHONE ENCOUNTER
Those numbers look good. I would be curious what his thyroid numbers look like, as this can cause his symptoms. I will place the orders.

## 2021-05-25 ENCOUNTER — NURSE ONLY (OUTPATIENT)
Dept: FAMILY MEDICINE CLINIC | Age: 51
End: 2021-05-25
Payer: COMMERCIAL

## 2021-05-25 DIAGNOSIS — R53.83 FATIGUE, UNSPECIFIED TYPE: ICD-10-CM

## 2021-05-25 PROCEDURE — 36415 COLL VENOUS BLD VENIPUNCTURE: CPT | Performed by: NURSE PRACTITIONER

## 2021-05-26 LAB — TSH SERPL DL<=0.05 MIU/L-ACNC: 1.56 UIU/ML (ref 0.4–4.2)

## 2021-05-27 ENCOUNTER — TELEPHONE (OUTPATIENT)
Dept: CARDIOLOGY CLINIC | Age: 51
End: 2021-05-27

## 2021-05-27 DIAGNOSIS — I10 HYPERTENSION, UNSPECIFIED TYPE: ICD-10-CM

## 2021-05-27 DIAGNOSIS — R93.1 ABNORMAL ECHOCARDIOGRAM: Primary | ICD-10-CM

## 2021-05-27 DIAGNOSIS — R53.83 FATIGUE, UNSPECIFIED TYPE: ICD-10-CM

## 2021-05-27 NOTE — TELEPHONE ENCOUNTER
Received a referral for patient to see Dr. Helio Sutherland due to abnormal echo. LM for patient to call our office back. Please schedule him on 6/18 in [place] at 0945. If that doesn't work Marv will talk to patient to find a different time.

## 2021-05-27 NOTE — TELEPHONE ENCOUNTER
Patient called office back regarding scheduling NP appt with Dr. Nara Goetz. Patient stated he wants to hold off on setting up an appt at this time. Jun Wallace stated he wants to wait and see PCP again first then decide if he needs appt with Dr. Nara Goetz.     MIKI

## 2021-05-27 NOTE — TELEPHONE ENCOUNTER
Please call Stefan King and notify him that he had an echocardiogram in the hospital that was abnormal, he needs to see Cardiology

## 2021-06-17 ENCOUNTER — OFFICE VISIT (OUTPATIENT)
Dept: FAMILY MEDICINE CLINIC | Age: 51
End: 2021-06-17
Payer: COMMERCIAL

## 2021-06-17 VITALS
DIASTOLIC BLOOD PRESSURE: 80 MMHG | HEART RATE: 80 BPM | WEIGHT: 166 LBS | SYSTOLIC BLOOD PRESSURE: 146 MMHG | RESPIRATION RATE: 16 BRPM | HEIGHT: 70 IN | BODY MASS INDEX: 23.77 KG/M2 | OXYGEN SATURATION: 98 %

## 2021-06-17 DIAGNOSIS — Z72.0 TOBACCO ABUSE: ICD-10-CM

## 2021-06-17 DIAGNOSIS — R93.1 ABNORMAL ECHOCARDIOGRAM: ICD-10-CM

## 2021-06-17 DIAGNOSIS — I10 HYPERTENSION, UNSPECIFIED TYPE: Primary | ICD-10-CM

## 2021-06-17 DIAGNOSIS — I63.89 AC CEREBRAL INFARCTION W/ ISCHEMIA (HCC): ICD-10-CM

## 2021-06-17 DIAGNOSIS — I63.9 CEREBROVASCULAR ACCIDENT (CVA), UNSPECIFIED MECHANISM (HCC): ICD-10-CM

## 2021-06-17 DIAGNOSIS — N52.9 ERECTILE DYSFUNCTION, UNSPECIFIED ERECTILE DYSFUNCTION TYPE: ICD-10-CM

## 2021-06-17 PROCEDURE — 99214 OFFICE O/P EST MOD 30 MIN: CPT | Performed by: NURSE PRACTITIONER

## 2021-06-17 RX ORDER — ASPIRIN 325 MG
TABLET ORAL
Qty: 90 TABLET | Refills: 3 | Status: ON HOLD | OUTPATIENT
Start: 2021-06-17 | End: 2021-08-09 | Stop reason: HOSPADM

## 2021-06-17 RX ORDER — SIMVASTATIN 10 MG
10 TABLET ORAL NIGHTLY
Qty: 30 TABLET | Refills: 2 | Status: SHIPPED | OUTPATIENT
Start: 2021-06-17 | End: 2021-07-23

## 2021-06-17 ASSESSMENT — ENCOUNTER SYMPTOMS
EYE DISCHARGE: 0
BACK PAIN: 0
FACIAL SWELLING: 0
ABDOMINAL DISTENTION: 0
BLOOD IN STOOL: 0
SINUS PRESSURE: 0
SHORTNESS OF BREATH: 0
EYE REDNESS: 0
CONSTIPATION: 0
SORE THROAT: 0
EYE ITCHING: 0
EYE PAIN: 0
VOMITING: 0
TROUBLE SWALLOWING: 0
SINUS PAIN: 0
COUGH: 0
ABDOMINAL PAIN: 0
DIARRHEA: 0
COLOR CHANGE: 0
CHEST TIGHTNESS: 0
WHEEZING: 0
RECTAL PAIN: 0
NAUSEA: 0

## 2021-06-17 NOTE — PATIENT INSTRUCTIONS
ibuprofen. Some of these medicines can raise blood pressure. · Learn how to check your blood pressure at home. Lifestyle changes  · Stay at a healthy weight. This is especially important if you put on weight around the waist. Losing even 10 pounds can help you lower your blood pressure. · If your doctor recommends it, get more exercise. Walking is a good choice. Bit by bit, increase the amount you walk every day. Try for at least 30 minutes on most days of the week. You also may want to swim, bike, or do other activities. · Avoid or limit alcohol. Talk to your doctor about whether you can drink any alcohol. · Try to limit how much sodium you eat to less than 2,300 milligrams (mg) a day. Your doctor may ask you to try to eat less than 1,500 mg a day. · Eat plenty of fruits (such as bananas and oranges), vegetables, legumes, whole grains, and low-fat dairy products. · Lower the amount of saturated fat in your diet. Saturated fat is found in animal products such as milk, cheese, and meat. Limiting these foods may help you lose weight and also lower your risk for heart disease. · Do not smoke. Smoking increases your risk for heart attack and stroke. If you need help quitting, talk to your doctor about stop-smoking programs and medicines. These can increase your chances of quitting for good. When should you call for help? Call 911  anytime you think you may need emergency care. This may mean having symptoms that suggest that your blood pressure is causing a serious heart or blood vessel problem. Your blood pressure may be over 180/120. For example, call 911 if:    · You have symptoms of a heart attack. These may include:  ? Chest pain or pressure, or a strange feeling in the chest.  ? Sweating. ? Shortness of breath. ? Nausea or vomiting. ? Pain, pressure, or a strange feeling in the back, neck, jaw, or upper belly or in one or both shoulders or arms. ? Lightheadedness or sudden weakness.   ? A fast or irregular heartbeat.     · You have symptoms of a stroke. These may include:  ? Sudden numbness, tingling, weakness, or loss of movement in your face, arm, or leg, especially on only one side of your body. ? Sudden vision changes. ? Sudden trouble speaking. ? Sudden confusion or trouble understanding simple statements. ? Sudden problems with walking or balance. ? A sudden, severe headache that is different from past headaches.     · You have severe back or belly pain. Do not wait until your blood pressure comes down on its own. Get help right away. Call your doctor now or seek immediate care if:    · Your blood pressure is much higher than normal (such as 180/120 or higher), but you don't have symptoms.     · You think high blood pressure is causing symptoms, such as:  ? Severe headache.  ? Blurry vision. Watch closely for changes in your health, and be sure to contact your doctor if:    · Your blood pressure measures higher than your doctor recommends at least 2 times. That means the top number is higher or the bottom number is higher, or both.     · You think you may be having side effects from your blood pressure medicine. Where can you learn more? Go to [email or web address] and sign in to your [employer] account. Enter S635 in the TVTY box to learn more about \"High Blood Pressure: Care Instructions. \"     If you do not have an account, please click on the \"Sign Up Now\" link. Current as of: August 31, 2020               Content Version: 12.9  © 2006-2021 Healthwise, Incorporated. Care instructions adapted under license by [facility] ([facility]). If you have questions about a medical condition or this instruction, always ask your healthcare professional. [place] [address] any warranty or liability for your use of this information.

## 2021-06-17 NOTE — PROGRESS NOTES
[address] [address]  Dept: [phone]  Loc: 222 [facility] (:  1970) is a 46 y.o. male,Established patient, here for evaluation of the following chief complaint(s):  Hypertension (6 week follow up) and Discuss Medications (stopped taking lipitor- makes sick- stopped asa )      ASSESSMENT/PLAN:  1. Hypertension, unspecified type  Controlled. 146/80    2. Cerebrovascular accident (CVA), unspecified mechanism ([address]  Hx of CVA, s/p Covid vaccine   Was on Lipitor, states it made him feel sick, will try simvastatin. - simvastatin (ZOCOR) 10 MG tablet; Take 1 tablet by mouth nightly  Dispense: 30 tablet; Refill: 2  - aspirin (EQ ASPIRIN) 325 MG tablet; TAKE 1 TABLET BY MOUTH ONCE DAILY  Dispense: 90 tablet; Refill: 3    3. Abnormal echocardiogram  Referred to Cardiology, has an appointment tomorrow. 4. Tobacco abuse  Started Chantix on , doing well so far. Was doing 3 packs a day, down to 1 pack per day. 5. Ac cerebral infarction w/ ischemia (HCC)  - aspirin (EQ ASPIRIN) 325 MG tablet; TAKE 1 TABLET BY MOUTH ONCE DAILY  Dispense: 90 tablet; Refill: 3    6. Erectile dysfunction, unspecified erectile dysfunction type  At this time wants to hold off on Viagra. likely due to smoking and HTN. Return in about 6 months (around 2021). SUBJECTIVE/OBJECTIVE:  Blood pressure at home has been running around:     140/80 average at home. Stopped atorvastatin     Started Chantix on , doing well so good. Was doing 3 packs a day, down to 1 pack per day. May be interested in [place]. See cardiology tomorrow for Abnormal Echo       has a past medical history of Essential hypertension and Stroke [facility]). Review of Systems   Constitutional: Negative for activity change, appetite change, fatigue, fever and unexpected weight change.    HENT: Negative for congestion, dental problem, ear pain, facial swelling, mouth sores, postnasal drip, sinus pressure, sinus pain, sore throat and trouble swallowing. Eyes: Negative for pain, discharge, redness, itching and visual disturbance. Respiratory: Negative for cough, chest tightness, shortness of breath and wheezing. Cardiovascular: Negative for chest pain, palpitations and leg swelling. Gastrointestinal: Negative for abdominal distention, abdominal pain, blood in stool, constipation, diarrhea, nausea, rectal pain and vomiting. Endocrine: Negative for cold intolerance and heat intolerance. Genitourinary: Negative for difficulty urinating, dysuria, frequency, hematuria, penile pain, scrotal swelling, testicular pain and urgency. Musculoskeletal: Negative for arthralgias, back pain, gait problem and neck pain. Skin: Negative for color change, rash and wound. Neurological: Negative for dizziness, seizures, weakness, light-headedness, numbness and headaches. Psychiatric/Behavioral: Negative for agitation and sleep disturbance. Physical Exam  Vitals and nursing note reviewed. Constitutional:       General: He is not in acute distress. Appearance: Normal appearance. He is well-developed. He is not diaphoretic. HENT:      Head: Normocephalic and atraumatic. Right Ear: Tympanic membrane and external ear normal. Tympanic membrane is not injected or erythematous. Left Ear: Tympanic membrane and external ear normal. Tympanic membrane is not injected or erythematous. Nose: Nose normal.      Mouth/Throat:      Pharynx: Uvula midline. Eyes:      General:         Right eye: No discharge. Left eye: No discharge. Conjunctiva/sclera: Conjunctivae normal.      Pupils: Pupils are equal, round, and reactive to light. Neck:      Thyroid: No thyromegaly. Trachea: Trachea normal.   Cardiovascular:      Rate and Rhythm: Normal rate and regular rhythm. Pulses: Normal pulses.       Heart sounds: Normal heart sounds, S1 normal and S2 normal. No murmur heard. No friction rub. No gallop. Pulmonary:      Effort: Pulmonary effort is normal. No respiratory distress. Breath sounds: Normal breath sounds. No wheezing or rales. Chest:      Chest wall: No tenderness. Abdominal:      General: Bowel sounds are normal. There is no distension. Palpations: Abdomen is soft. There is no mass. Tenderness: There is no abdominal tenderness. There is no guarding or rebound. Musculoskeletal:         General: No tenderness or deformity. Normal range of motion. Cervical back: Full passive range of motion without pain, normal range of motion and neck supple. Lymphadenopathy:      Cervical: No cervical adenopathy. Skin:     General: Skin is warm and dry. Capillary Refill: Capillary refill takes less than 2 seconds. Neurological:      Mental Status: He is alert and oriented to person, place, and time. Deep Tendon Reflexes: Reflexes are normal and symmetric. An electronic signature was used to authenticate this note.     --Gudelia Turner, RACHEL - CNP

## 2021-06-18 ENCOUNTER — OFFICE VISIT (OUTPATIENT)
Dept: CARDIOLOGY CLINIC | Age: 51
End: 2021-06-18
Payer: COMMERCIAL

## 2021-06-18 ENCOUNTER — TELEPHONE (OUTPATIENT)
Dept: CARDIOLOGY CLINIC | Age: 51
End: 2021-06-18

## 2021-06-18 VITALS
SYSTOLIC BLOOD PRESSURE: 160 MMHG | HEART RATE: 86 BPM | DIASTOLIC BLOOD PRESSURE: 82 MMHG | BODY MASS INDEX: 23.62 KG/M2 | WEIGHT: 165 LBS | HEIGHT: 70 IN

## 2021-06-18 DIAGNOSIS — I50.22 CHRONIC SYSTOLIC HEART FAILURE (HCC): ICD-10-CM

## 2021-06-18 DIAGNOSIS — R93.1 ABNORMAL ECHOCARDIOGRAM: ICD-10-CM

## 2021-06-18 DIAGNOSIS — I63.9 CEREBROVASCULAR ACCIDENT (CVA), UNSPECIFIED MECHANISM (HCC): Primary | ICD-10-CM

## 2021-06-18 DIAGNOSIS — R06.02 SHORTNESS OF BREATH: ICD-10-CM

## 2021-06-18 DIAGNOSIS — I35.1 AORTIC VALVE INSUFFICIENCY, ETIOLOGY OF CARDIAC VALVE DISEASE UNSPECIFIED: ICD-10-CM

## 2021-06-18 PROCEDURE — 99204 OFFICE O/P NEW MOD 45 MIN: CPT | Performed by: INTERNAL MEDICINE

## 2021-06-18 PROCEDURE — 93000 ELECTROCARDIOGRAM COMPLETE: CPT | Performed by: INTERNAL MEDICINE

## 2021-06-18 RX ORDER — CARVEDILOL 12.5 MG/1
12.5 TABLET ORAL 2 TIMES DAILY
Qty: 60 TABLET | Refills: 3 | Status: SHIPPED | OUTPATIENT
Start: 2021-06-18 | End: 2021-07-23

## 2021-06-18 NOTE — TELEPHONE ENCOUNTER
FYI- Patient in as New Patient with Dr. Darian Carrillo on 6/18/2021. He is doing a MARY to look at valve and to rule out PFO. Patient has a history of stroke.

## 2021-06-18 NOTE — PROGRESS NOTES
Patient reports being very SOB all the time. Patient states he doesn't take more than 300 steps per day.

## 2021-06-18 NOTE — PROGRESS NOTES
[address] [place] [address]  Dept: [phone]  Dept Fax: [phone]  Loc: [phone]    Visit Date: 6/18/2021    Mr. Heide Vázquez is a 46 y.o. 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 Pre-vaccine walking was unlimited, and afterwards cannot walk more than 300 steps. No chest pain. + smoker. He is quitting actively. He cannot lay flat at night due to breathing. No alcohol. He was found to have an EF 45-50%, possibly bicuspid AoV, and a calculated ABELARDO 0.84 cm2 by peak velocity method. No sig gradient. No valve issues in the family. ECG with SR. He was drinking 12 pack of alcohol per day x 30 years. No drugs. No palpitations or LOC. Current Outpatient Medications:     simvastatin (ZOCOR) 10 MG tablet, Take 1 tablet by mouth nightly, Disp: 30 tablet, Rfl: 2    aspirin (EQ ASPIRIN) 325 MG tablet, TAKE 1 TABLET BY MOUTH ONCE DAILY, Disp: 90 tablet, Rfl: 3    Handicap Placard MISC, by Does not apply route Expires in 5 years, Disp: 2 each, Rfl: 0    [employer] PURELAX 17 GM/SCOOP powder, TAKE 17 GRAMS AS DIRECTED DAILY AS NEEDED FOR CONSTIPATION FOR 3 DAYS.  THEN AS NEEDED., Disp: , Rfl:     NIFEdipine (PROCARDIA XL) 90 MG extended release tablet, Take 1 tablet by mouth daily, Disp: 90 tablet, Rfl: 3    lisinopril (PRINIVIL;ZESTRIL) 40 MG tablet, Take 1 tablet by mouth daily, Disp: 90 tablet, Rfl: 3    varenicline (CHANTIX) 0.5 MG tablet, Take 1-2 tablets by mouth See Admin Instructions 0.5mg DAILY for 3 days followed by 0.5mg TWICE DAILY for 4 days followed by 1mg TWICE DAILY, Disp: 57 tablet, Rfl: 0    Past Medical History  Jenise Vásquez  has a past medical history of Essential hypertension and Stroke ([facility] Social History  Meli Betancourt  reports that he has been smoking cigarettes. He has been smoking about 2.00 packs per day. He has never used smokeless tobacco. He reports current alcohol use. Family History  Meli Betancourt family history includes Breast Cancer in his mother; Other in his father. There is no family history of bicuspid aortic valve, aneurysms, heart transplant, pacemakers, defibrillators, or sudden cardiac death. Past Surgical History   No past surgical history on file. Review of Systems   Constitutional: Negative for chills and fever  HENT: Negative for congestion, sinus pressure, sneezing and sore throat. Eyes: Negative for pain, discharge, redness and itching. Respiratory: Negative for apnea, cough  Gastrointestinal: Negative for blood in stool, constipation, diarrhea   Endocrine: Negative for cold intolerance, heat intolerance, polydipsia. Genitourinary: Negative for dysuria, enuresis, flank pain and hematuria. Musculoskeletal: Negative for arthralgias, joint swelling and neck pain. Neurological: Negative for numbness and headaches. Psychiatric/Behavioral: Negative for agitation, confusion, decreased concentration and dysphoric mood. Objective:     BP (!) 160/82   Pulse 86   Ht 5' 10\" (1.778 m)   Wt 165 lb (74.8 kg)   BMI 23.68 kg/m²     Wt Readings from Last 3 Encounters:   06/18/21 165 lb (74.8 kg)   06/17/21 166 lb (75.3 kg)   05/06/21 173 lb (78.5 kg)     BP Readings from Last 3 Encounters:   06/18/21 (!) 160/82   06/17/21 (!) 146/80   05/06/21 (!) 160/80       Nursing note and vitals reviewed. Physical Exam   Constitutional: Oriented to person, place, and time. Appears well-developed and well-nourished. HENT:   Head: Normocephalic and atraumatic. Eyes: EOM are normal. Pupils are equal, round, and reactive to light. Neck: Normal range of motion. Neck supple. No JVD present.    Cardiovascular: Normal rate, regular rhythm, normal heart sounds and intact distal Will need R/LHC, also will need MARY to evaluate the valve, EF, and possible PFO. Also will need 30 day event monitor to search for occult Afib. R/B/A of the procedure discussed and he wants to proceed. Continue ASA, CCB, Lisinopril, add Coreg 12.5 mg BID. The patient was advised on risk/benefits of the new Rx and he agreed to proceed with the medication(s). Discussed diet/exercise/BP/weight loss/health lifestyle choices/lipids; the patient understands the goals and will try to comply. If no findings, will need PFTs and pulmonary work-up.         Disposition:  4-6 weeks         Electronically signed by Neal Aleman MD   6/18/2021 at 10:37 AM EDT

## 2021-06-23 ENCOUNTER — TELEPHONE (OUTPATIENT)
Dept: CARDIOLOGY CLINIC | Age: 51
End: 2021-06-23

## 2021-06-23 NOTE — TELEPHONE ENCOUNTER
Thank you but what is the abnormality? You  Florentino Philippe 8 hours ago (7:43 AM)   JS  Good morning Nan Pelaez,   Your EKG is slightly abnormal.  Dr. Gavi Garcia has you scheduled for a heart cath which will evaluate the arteries in your heart to see if there are any blockages. He is also doing a MARY to take a look at the heart muscle and valves a little closer. If you have any questions please send us a message through [employer] or you can call us at [phone], option #3. Reed Greenwood MD 19 hours ago (8:27 PM)   DC  EKG-12 results indicate I need to ask you as they can not be provided via [facility]. What were they? LM for patient to call our office back to discuss.

## 2021-06-30 ENCOUNTER — OFFICE VISIT (OUTPATIENT)
Dept: FAMILY MEDICINE CLINIC | Age: 51
End: 2021-06-30
Payer: COMMERCIAL

## 2021-06-30 VITALS
DIASTOLIC BLOOD PRESSURE: 72 MMHG | SYSTOLIC BLOOD PRESSURE: 122 MMHG | WEIGHT: 158 LBS | HEART RATE: 68 BPM | TEMPERATURE: 98.5 F | BODY MASS INDEX: 22.67 KG/M2 | OXYGEN SATURATION: 97 % | RESPIRATION RATE: 14 BRPM

## 2021-06-30 DIAGNOSIS — R42 DIZZINESS: ICD-10-CM

## 2021-06-30 DIAGNOSIS — J02.9 SORE THROAT: Primary | ICD-10-CM

## 2021-06-30 DIAGNOSIS — K21.9 GASTROESOPHAGEAL REFLUX DISEASE, UNSPECIFIED WHETHER ESOPHAGITIS PRESENT: ICD-10-CM

## 2021-06-30 PROCEDURE — 99214 OFFICE O/P EST MOD 30 MIN: CPT | Performed by: NURSE PRACTITIONER

## 2021-06-30 RX ORDER — AMOXICILLIN 500 MG/1
500 CAPSULE ORAL 2 TIMES DAILY
Qty: 14 CAPSULE | Refills: 0 | Status: SHIPPED | OUTPATIENT
Start: 2021-06-30 | End: 2021-07-07

## 2021-06-30 RX ORDER — OMEPRAZOLE 20 MG/1
20 CAPSULE, DELAYED RELEASE ORAL
Qty: 90 CAPSULE | Refills: 1 | Status: SHIPPED | OUTPATIENT
Start: 2021-06-30 | End: 2021-09-07 | Stop reason: SDUPTHER

## 2021-06-30 ASSESSMENT — ENCOUNTER SYMPTOMS
EYE PAIN: 0
DIARRHEA: 0
SINUS PAIN: 0
ABDOMINAL DISTENTION: 0
FACIAL SWELLING: 0
VOMITING: 0
ABDOMINAL PAIN: 0
COLOR CHANGE: 0
EYE REDNESS: 0
EYE DISCHARGE: 0
CHEST TIGHTNESS: 0
EYE ITCHING: 0
BACK PAIN: 0
NAUSEA: 0
SHORTNESS OF BREATH: 0
TROUBLE SWALLOWING: 0
BLOOD IN STOOL: 0
SORE THROAT: 1
COUGH: 0
SINUS PRESSURE: 0
RECTAL PAIN: 0
CONSTIPATION: 0
WHEEZING: 0

## 2021-06-30 NOTE — PROGRESS NOTES
[address] [address]  Dept: [phone]  Loc: 222 [facility] (:  1970) is a 46 y.o. male,Established patient, here for evaluation of the following chief complaint(s):  Pharyngitis (x6 days)      ASSESSMENT/PLAN:  1. Sore throat  -     amoxicillin (AMOXIL) 500 MG capsule; Take 1 capsule by mouth 2 times daily for 7 days, Disp-14 capsule, R-0Normal  2. Gastroesophageal reflux disease, unspecified whether esophagitis present  -     omeprazole (PRILOSEC) 20 MG delayed release capsule; Take 1 capsule by mouth every morning (before breakfast), Disp-90 capsule, R-1Normal  3. Dizziness    Gi cocktail given and patient noted relief to throat and esophagus. Rx for omeprazole written. Covering with Amoxil, d/t upcoming MARY. Spoke with Cardiology and will stop Coreg d/t the dizziness and hypotension at times. Return in about 1 week (around 2021), or if symptoms worsen or fail to improve. SUBJECTIVE/OBJECTIVE:  Since starting coreg bid, has passed out and had several dizzy episodes, blood pressure low during these times also. Sore throat and esopheagal burning. has a past medical history of Essential hypertension and Stroke ([address] Review of Systems   Constitutional: Negative for activity change, appetite change, fatigue, fever and unexpected weight change. HENT: Positive for sore throat. Negative for congestion, dental problem, ear pain, facial swelling, mouth sores, postnasal drip, sinus pressure, sinus pain and trouble swallowing. Eyes: Negative for pain, discharge, redness, itching and visual disturbance. Respiratory: Negative for cough, chest tightness, shortness of breath and wheezing. Cardiovascular: Negative for chest pain, palpitations and leg swelling.    Gastrointestinal: Negative for abdominal distention, abdominal pain, blood in stool, constipation, diarrhea, nausea, rectal pain and vomiting. Reflux pain     Endocrine: Negative for cold intolerance and heat intolerance. Genitourinary: Negative for difficulty urinating, dysuria, frequency, hematuria, penile pain, scrotal swelling, testicular pain and urgency. Musculoskeletal: Negative for arthralgias, back pain, gait problem and neck pain. Skin: Negative for color change, rash and wound. Neurological: Positive for dizziness and light-headedness (not currently). Negative for seizures, weakness, numbness and headaches. Psychiatric/Behavioral: Negative for agitation and sleep disturbance. Physical Exam  Vitals and nursing note reviewed. Constitutional:       General: He is not in acute distress. Appearance: Normal appearance. He is well-developed. He is not diaphoretic. HENT:      Head: Normocephalic and atraumatic. Right Ear: Tympanic membrane, ear canal and external ear normal. Tympanic membrane is not injected or erythematous. Left Ear: Tympanic membrane, ear canal and external ear normal. Tympanic membrane is not injected or erythematous. Nose: Nose normal.      Mouth/Throat:      Mouth: Mucous membranes are moist.      Pharynx: Oropharynx is clear. Uvula midline. Posterior oropharyngeal erythema present. Eyes:      General:         Right eye: No discharge. Left eye: No discharge. Conjunctiva/sclera: Conjunctivae normal.      Pupils: Pupils are equal, round, and reactive to light. Neck:      Thyroid: No thyromegaly. Trachea: Trachea normal.   Cardiovascular:      Rate and Rhythm: Normal rate and regular rhythm. Pulses: Normal pulses. Heart sounds: Normal heart sounds, S1 normal and S2 normal. No murmur heard. No friction rub. No gallop. Pulmonary:      Effort: Pulmonary effort is normal. No respiratory distress. Breath sounds: Normal breath sounds. No wheezing or rales. Chest:      Chest wall: No tenderness.    Abdominal:

## 2021-06-30 NOTE — PATIENT INSTRUCTIONS
Patient Education        Sore Throat: Care Instructions  Your Care Instructions     Infection by bacteria or a virus causes most sore throats. Cigarette smoke, dry air, air pollution, allergies, and yelling can also cause a sore throat. Sore throats can be painful and annoying. Fortunately, most sore throats go away on their own. If you have a bacterial infection, your doctor may prescribe antibiotics. Follow-up care is a key part of your treatment and safety. Be sure to make and go to all appointments, and call your doctor if you are having problems. It's also a good idea to know your test results and keep a list of the medicines you take. How can you care for yourself at home? · If your doctor prescribed antibiotics, take them as directed. Do not stop taking them just because you feel better. You need to take the full course of antibiotics. · Gargle with warm salt water once an hour to help reduce swelling and relieve discomfort. Use 1 teaspoon of salt mixed in 1 cup of warm water. · Take an over-the-counter pain medicine, such as acetaminophen (Tylenol), ibuprofen (Advil, Motrin), or naproxen (Aleve). Read and follow all instructions on the label. · Be careful when taking over-the-counter cold or flu medicines and Tylenol at the same time. Many of these medicines have acetaminophen, which is Tylenol. Read the labels to make sure that you are not taking more than the recommended dose. Too much acetaminophen (Tylenol) can be harmful. · Drink plenty of fluids. Fluids may help soothe an irritated throat. Hot fluids, such as tea or soup, may help decrease throat pain. · Use over-the-counter throat lozenges to soothe pain. Regular cough drops or hard candy may also help. These should not be given to young children because of the risk of choking. · Do not smoke or allow others to smoke around you. If you need help quitting, talk to your doctor about stop-smoking programs and medicines.  These can increase your chances of quitting for good. · Use a vaporizer or humidifier to add moisture to your bedroom. Follow the directions for cleaning the machine. When should you call for help? Call your doctor now or seek immediate medical care if:    · You have new or worse trouble swallowing.     · Your sore throat gets much worse on one side. Watch closely for changes in your health, and be sure to contact your doctor if you do not get better as expected. Where can you learn more? Go to [email or web address] and sign in to your [employer] account. Enter O113 in the Flipiture box to learn more about \"Sore Throat: Care Instructions. \"     If you do not have an account, please click on the \"Sign Up Now\" link. Current as of: December 2, 2020               Content Version: 12.9  © 2801-8667 Healthwise, Incorporated. Care instructions adapted under license by [facility] ([facility]). If you have questions about a medical condition or this instruction, always ask your healthcare professional. [place] [address] any warranty or liability for your use of this information.

## 2021-07-06 ENCOUNTER — PREP FOR PROCEDURE (OUTPATIENT)
Dept: CARDIOLOGY | Age: 51
End: 2021-07-06

## 2021-07-06 ENCOUNTER — TELEPHONE (OUTPATIENT)
Dept: CARDIOLOGY CLINIC | Age: 51
End: 2021-07-06

## 2021-07-06 DIAGNOSIS — Z01.818 PREOP TESTING: Primary | ICD-10-CM

## 2021-07-06 RX ORDER — SODIUM CHLORIDE 0.9 % (FLUSH) 0.9 %
5-40 SYRINGE (ML) INJECTION EVERY 12 HOURS SCHEDULED
Status: CANCELLED | OUTPATIENT
Start: 2021-07-06

## 2021-07-06 RX ORDER — SODIUM CHLORIDE 9 MG/ML
25 INJECTION, SOLUTION INTRAVENOUS PRN
Status: CANCELLED | OUTPATIENT
Start: 2021-07-06

## 2021-07-06 RX ORDER — ASPIRIN 325 MG
325 TABLET ORAL ONCE
Status: CANCELLED | OUTPATIENT
Start: 2021-07-06 | End: 2021-07-06

## 2021-07-06 RX ORDER — DIPHENHYDRAMINE HYDROCHLORIDE 50 MG/ML
50 INJECTION INTRAMUSCULAR; INTRAVENOUS ONCE
Status: CANCELLED | OUTPATIENT
Start: 2021-07-06 | End: 2021-07-06

## 2021-07-06 RX ORDER — SODIUM CHLORIDE 0.9 % (FLUSH) 0.9 %
5-40 SYRINGE (ML) INJECTION PRN
Status: CANCELLED | OUTPATIENT
Start: 2021-07-06

## 2021-07-06 RX ORDER — NITROGLYCERIN 0.4 MG/1
0.4 TABLET SUBLINGUAL EVERY 5 MIN PRN
Status: CANCELLED | OUTPATIENT
Start: 2021-07-06

## 2021-07-06 RX ORDER — SODIUM CHLORIDE 9 MG/ML
INJECTION, SOLUTION INTRAVENOUS CONTINUOUS
Status: CANCELLED | OUTPATIENT
Start: 2021-07-06

## 2021-07-07 ENCOUNTER — HOSPITAL ENCOUNTER (OUTPATIENT)
Dept: NON INVASIVE DIAGNOSTICS | Age: 51
Discharge: HOME | End: 2021-07-07
Attending: INTERNAL MEDICINE
Payer: COMMERCIAL

## 2021-07-07 DIAGNOSIS — I63.9 CEREBROVASCULAR ACCIDENT (CVA), UNSPECIFIED MECHANISM (HCC): ICD-10-CM

## 2021-07-07 DIAGNOSIS — R93.1 ABNORMAL ECHOCARDIOGRAM: ICD-10-CM

## 2021-07-07 PROCEDURE — 93270 REMOTE 30 DAY ECG REV/REPORT: CPT

## 2021-07-07 NOTE — PROCEDURES
The skin was prepped and a 30 day cardiac event monitor was applied. The patient was instructed on the documentation of symptoms and the purpose of the monitor as well as the things to avoid while wearing the monitor. The patient was instructed to remove and return the monitor on 08/06/2021.   The serial number of the monitor that was applied is [id number]

## 2021-07-08 ENCOUNTER — TELEPHONE (OUTPATIENT)
Dept: CARDIOLOGY CLINIC | Age: 51
End: 2021-07-08

## 2021-07-08 DIAGNOSIS — I25.10 CORONARY ARTERY DISEASE INVOLVING NATIVE CORONARY ARTERY OF NATIVE HEART WITHOUT ANGINA PECTORIS: Primary | ICD-10-CM

## 2021-07-14 ENCOUNTER — OFFICE VISIT (OUTPATIENT)
Dept: CARDIOTHORACIC SURGERY | Age: 51
End: 2021-07-14
Payer: COMMERCIAL

## 2021-07-14 ENCOUNTER — TELEPHONE (OUTPATIENT)
Dept: CARDIOTHORACIC SURGERY | Age: 51
End: 2021-07-14

## 2021-07-14 VITALS
WEIGHT: 162 LBS | SYSTOLIC BLOOD PRESSURE: 137 MMHG | HEIGHT: 70 IN | BODY MASS INDEX: 23.19 KG/M2 | DIASTOLIC BLOOD PRESSURE: 69 MMHG | HEART RATE: 80 BPM

## 2021-07-14 DIAGNOSIS — I25.10 CAD, MULTIPLE VESSEL: Primary | ICD-10-CM

## 2021-07-14 DIAGNOSIS — Z01.818 PRE-OP EXAMINATION: ICD-10-CM

## 2021-07-14 PROCEDURE — 99205 OFFICE O/P NEW HI 60 MIN: CPT | Performed by: THORACIC SURGERY (CARDIOTHORACIC VASCULAR SURGERY)

## 2021-07-14 ASSESSMENT — ENCOUNTER SYMPTOMS
SHORTNESS OF BREATH: 1
CHEST TIGHTNESS: 1
COLOR CHANGE: 0
ABDOMINAL PAIN: 0
EYE DISCHARGE: 0

## 2021-07-14 NOTE — TELEPHONE ENCOUNTER
PRIOR AUTHORIZATION:     Submitted request form and clinicals to [facility] via fax [phone]  CPT: 34527  ICD-10: I25.10  DOS: 7/27/21

## 2021-07-14 NOTE — TELEPHONE ENCOUNTER
Surgery is scheduled for 7/27/21 at 1230PM to follow first case. He agreed to date and time. Special surgery instructions are as follows:  - Arrive to [facility] at [facility] at 930AM  - NPO after midnight the night before surgery  - OK to continue  MG  - PATIENT IS NO LONGER TAKING COREG PER PATIENT  - LM for PAT to contact patient to set up visit  - CT chest, carotid duplex, and vein mapping scheduled for 7/23/21 starting at 1:40PM    Informed patient of instructions at [address] today. He denied questions or concerns at this time. He voiced all understanding.

## 2021-07-14 NOTE — PROGRESS NOTES
[facility] appointment reminder call  Arrival time and location given 07/23 at 08:00 in [facility]  Bring drivers license and insurance  Bring list of medications with dosage and frequency  If possible bring caregiver for appointment  Appointment may last 2 hours

## 2021-07-14 NOTE — PROGRESS NOTES
[facility]  [address] [facility] [address]  Dept: [phone]  Dept Fax: [phone]: [phone]    Visit Date: 7/14/2021    Mr. Sergey Esparza is a 46 y.o.male  who presented for:  Chief Complaint   Patient presents with    New Patient     CABG consult    Other     LHC on 7/7/21    Other     Syncope, \"falling a lot\"       HPI:   46year old male non-diabetic presents with worsening fatigue and syncopal episodes without chest pain. Murmur detected on physical exam prompted TTE & MARY, essentially showing low normal LVEF and mild AS (peak gradient 22). LHC shows severe multivessel CAD. Patient incidentally reports onset of left-sided weakness after COVID vaccination in February, which has improved. Previous heavy smoker, in process of quitting. Current Outpatient Medications:     omeprazole (PRILOSEC) 20 MG delayed release capsule, Take 1 capsule by mouth every morning (before breakfast), Disp: 90 capsule, Rfl: 1    carvedilol (COREG) 12.5 MG tablet, Take 1 tablet by mouth 2 times daily, Disp: 60 tablet, Rfl: 3    simvastatin (ZOCOR) 10 MG tablet, Take 1 tablet by mouth nightly, Disp: 30 tablet, Rfl: 2    aspirin (EQ ASPIRIN) 325 MG tablet, TAKE 1 TABLET BY MOUTH ONCE DAILY, Disp: 90 tablet, Rfl: 3    Handicap Placard MISC, by Does not apply route Expires in 5 years, Disp: 2 each, Rfl: 0    [employer] PURELAX 17 GM/SCOOP powder, TAKE 17 GRAMS AS DIRECTED DAILY AS NEEDED FOR CONSTIPATION FOR 3 DAYS.  THEN AS NEEDED., Disp: , Rfl:     NIFEdipine (PROCARDIA XL) 90 MG extended release tablet, Take 1 tablet by mouth daily, Disp: 90 tablet, Rfl: 3    varenicline (CHANTIX) 0.5 MG tablet, Take 1-2 tablets by mouth See Admin Instructions 0.5mg DAILY for 3 days followed by 0.5mg TWICE DAILY for 4 days followed by 1mg TWICE DAILY, Disp: 57 tablet, Rfl: 0    lisinopril (PRINIVIL;ZESTRIL) 40 MG tablet, Take 1 tablet by mouth daily, Disp: 90 tablet, Rfl: 3    No Known Allergies    Past Medical History  Vasyl Guevara  has a past medical history of Essential hypertension and Stroke ([facility] [address] Social History  Vasyl Guevara  reports that he has been smoking cigarettes. He started smoking about 31 years ago. He has been smoking about 0.25 packs per day. He has never used smokeless tobacco. He reports current alcohol use. He reports that he does not use drugs. Family History  Vasyl Guevara family history includes Breast Cancer in his mother; Other in his father. There is no family history of bicuspid aortic valve, aneurysms, heart transplant, pacemakers, defibrillators, or sudden cardiac death. Past Surgical History   History reviewed. No pertinent surgical history. Subjective:     Review of Systems   Constitutional: Positive for activity change and fatigue. HENT: Negative for congestion. Eyes: Negative for discharge. Respiratory: Positive for chest tightness and shortness of breath. Cardiovascular: Negative for chest pain. Gastrointestinal: Negative for abdominal pain. Endocrine: Negative for cold intolerance. Genitourinary: Negative for difficulty urinating. Musculoskeletal: Negative for arthralgias. Skin: Negative for color change. Neurological: Positive for dizziness and weakness. Hematological: Negative for adenopathy. Psychiatric/Behavioral: Negative for agitation. Objective:     /69 (Site: Left Upper Arm, Position: Sitting, Cuff Size: Medium Adult)   Pulse 80   Ht 5' 10\" (1.778 m)   Wt 162 lb (73.5 kg)   BMI 23.24 kg/m²     Wt Readings from Last 3 Encounters:   07/14/21 162 lb (73.5 kg)   07/07/21 158 lb (71.7 kg)   06/30/21 158 lb (71.7 kg)     BP Readings from Last 3 Encounters:   07/14/21 137/69   07/07/21 (!) 149/102   06/30/21 122/72       Physical Exam  Vitals reviewed. Constitutional:       Appearance: Normal appearance. He is normal weight.    HENT:      Head: Normocephalic and atraumatic. Right Ear: Tympanic membrane, ear canal and external ear normal.      Left Ear: Tympanic membrane, ear canal and external ear normal.      Nose: Nose normal.      Mouth/Throat:      Mouth: Mucous membranes are moist.      Pharynx: Oropharynx is clear. Eyes:      Conjunctiva/sclera: Conjunctivae normal.      Pupils: Pupils are equal, round, and reactive to light. Neck:      Vascular: No carotid bruit. Cardiovascular:      Rate and Rhythm: Normal rate and regular rhythm. Pulses: Normal pulses. Heart sounds: Murmur heard. Pulmonary:      Effort: Pulmonary effort is normal.      Breath sounds: Normal breath sounds. Abdominal:      General: Abdomen is flat. Bowel sounds are normal.   Musculoskeletal:         General: No swelling. Normal range of motion. Cervical back: Normal range of motion. Skin:     General: Skin is warm and dry. Capillary Refill: Capillary refill takes 2 to 3 seconds. Neurological:      General: No focal deficit present. Mental Status: He is alert.    Psychiatric:         Mood and Affect: Mood normal.         Lab Results   Component Value Date    WBC 7.8 07/07/2021    RBC 3.29 07/07/2021    HGB 11.5 07/07/2021    HCT 31.7 07/07/2021    MCV 96.4 07/07/2021    MCH 35.0 07/07/2021    MCHC 36.3 07/07/2021    RDW 12.2 04/17/2021     07/07/2021    MPV 8.3 07/07/2021       Lab Results   Component Value Date     07/07/2021    K 5.2 07/07/2021    CL 85 07/07/2021    CO2 22 07/07/2021    BUN 25 07/07/2021    LABALBU 4.5 04/17/2021    CREATININE 1.5 07/07/2021    CALCIUM 10.4 07/07/2021    LABGLOM 49 07/07/2021    GLUCOSE 124 07/07/2021       Lab Results   Component Value Date    ALKPHOS 68 04/17/2021    ALT 44 04/17/2021    AST 45 04/17/2021    BILITOT 0.8 04/17/2021    LABALBU 4.5 04/17/2021       Lab Results   Component Value Date    MG 2.0 04/22/2021       Lab Results   Component Value Date    INR 0.94 07/07/2021         Lab Results Component Value Date    LABA1C 6.1 04/08/2021       Lab Results   Component Value Date    HDL 50 03/25/2021    LDLCALC 51 03/25/2021       Lab Results   Component Value Date    TSH 1.560 05/25/2021         Testing Reviewed:      I have individually reviewed the images of the following tests:    CT chest: pending    Echocardiogram:No results found for this or any previous visit. as per HPI    Left heart catheterization: as per HPI    Assessment/Plan     1. CAD, multiple vessel    2. Pre-op examination      No orders of the defined types were placed in this encounter. 46year old male with multivessel critical CAD. Plan CABG x 3, scheduled for 7/27. The risks, benefits and alternatives were discussed in detail with the patient and family. The risks include bleeding, infection, graft failure, cardiac arrhythmias, thromboembolism, stroke, need for reoperation and death. The patient expressed understanding of these issues, confirmed that all questions were answered, and desires to proceed. Total time spent on patient: 80 minutes, excluding any procedure.     Electronically signed by Olaf Jones MD   7/14/2021 at 10:37 AM EDT

## 2021-07-15 ENCOUNTER — OFFICE VISIT (OUTPATIENT)
Dept: FAMILY MEDICINE CLINIC | Age: 51
End: 2021-07-15
Payer: COMMERCIAL

## 2021-07-15 VITALS
SYSTOLIC BLOOD PRESSURE: 136 MMHG | HEART RATE: 80 BPM | BODY MASS INDEX: 23.19 KG/M2 | WEIGHT: 162 LBS | DIASTOLIC BLOOD PRESSURE: 68 MMHG | HEIGHT: 70 IN | OXYGEN SATURATION: 98 %

## 2021-07-15 DIAGNOSIS — I10 HYPERTENSION, UNSPECIFIED TYPE: ICD-10-CM

## 2021-07-15 DIAGNOSIS — Z72.0 TOBACCO ABUSE: Primary | ICD-10-CM

## 2021-07-15 DIAGNOSIS — I25.10 MULTIPLE VESSEL CORONARY ARTERY DISEASE: ICD-10-CM

## 2021-07-15 PROCEDURE — 99214 OFFICE O/P EST MOD 30 MIN: CPT | Performed by: NURSE PRACTITIONER

## 2021-07-15 RX ORDER — VARENICLINE TARTRATE 1 MG/1
1 TABLET, FILM COATED ORAL 2 TIMES DAILY
Qty: 60 TABLET | Refills: 5 | Status: ON HOLD | OUTPATIENT
Start: 2021-07-15 | End: 2021-08-05

## 2021-07-15 NOTE — PROGRESS NOTES
[address] [address]  Dept: [phone]  Loc: [facility] (:  1970) is a 46 y.o. male,Established patient, here for evaluation of the following chief complaint(s): Other (would like to discuss DMR- having open heart on  )      ASSESSMENT/PLAN:  1. Tobacco abuse  -     varenicline (CHANTIX) 1 MG tablet; Take 1 tablet by mouth 2 times daily, Disp-60 tablet, R-5Normal  2. Multiple vessel coronary artery disease  3. Hypertension, unspecified type    Patient is scheduled for OHS on . Encouraged to restart Chantix. He wants to discuss code status, advance directives after his surgery, for the time being he is Full Code. Return in about 3 months (around 10/15/2021) for HTN . SUBJECTIVE/OBJECTIVE:  Wanting to discuss code status, will remain Full code for OHS, depending on outcome and recovery, ayesha want to re address at a later date. Took chantix. .        has a past medical history of Essential hypertension and Stroke ([facility] [address] Review of Systems   Constitutional: Positive for fatigue (chronic). Negative for activity change, appetite change, fever and unexpected weight change. HENT: Negative for congestion, dental problem, ear pain, facial swelling, mouth sores, postnasal drip, sinus pressure, sinus pain, sore throat and trouble swallowing. Eyes: Negative for pain, discharge, redness, itching and visual disturbance. Respiratory: Negative for cough, chest tightness, shortness of breath and wheezing. Cardiovascular: Negative for chest pain, palpitations and leg swelling. Gastrointestinal: Negative for abdominal distention, abdominal pain, blood in stool, constipation, diarrhea, nausea, rectal pain and vomiting. Endocrine: Negative for cold intolerance and heat intolerance.    Genitourinary: Negative for difficulty urinating, dysuria, frequency, hematuria, penile pain, scrotal swelling, testicular pain and urgency. Musculoskeletal: Negative for arthralgias, back pain, gait problem and neck pain. Skin: Negative for color change, rash and wound. Neurological: Negative for dizziness, seizures, weakness, light-headedness, numbness and headaches. Psychiatric/Behavioral: Negative for agitation and sleep disturbance. Physical Exam  Vitals and nursing note reviewed. Constitutional:       General: He is not in acute distress. Appearance: Normal appearance. He is well-developed. He is not diaphoretic. HENT:      Head: Normocephalic and atraumatic. Right Ear: Tympanic membrane and external ear normal. Tympanic membrane is not injected or erythematous. Left Ear: Tympanic membrane and external ear normal. Tympanic membrane is not injected or erythematous. Nose: Nose normal.      Mouth/Throat:      Pharynx: Uvula midline. Eyes:      General:         Right eye: No discharge. Left eye: No discharge. Conjunctiva/sclera: Conjunctivae normal.      Pupils: Pupils are equal, round, and reactive to light. Neck:      Thyroid: No thyromegaly. Trachea: Trachea normal.   Cardiovascular:      Rate and Rhythm: Normal rate and regular rhythm. Pulses: Normal pulses. Heart sounds: Normal heart sounds, S1 normal and S2 normal. No murmur heard. No friction rub. No gallop. Pulmonary:      Effort: Pulmonary effort is normal. No respiratory distress. Breath sounds: Normal breath sounds. No wheezing or rales. Chest:      Chest wall: No tenderness. Abdominal:      General: Bowel sounds are normal. There is no distension. Palpations: Abdomen is soft. There is no mass. Tenderness: There is no abdominal tenderness. There is no guarding or rebound. Musculoskeletal:         General: No tenderness or deformity. Normal range of motion.       Cervical back: Full passive range of motion without pain, normal range of motion and neck supple. Lymphadenopathy:      Cervical: No cervical adenopathy. Skin:     General: Skin is warm and dry. Capillary Refill: Capillary refill takes less than 2 seconds. Neurological:      Mental Status: He is alert and oriented to person, place, and time. Deep Tendon Reflexes: Reflexes are normal and symmetric. Psychiatric:         Mood and Affect: Mood normal.         Behavior: Behavior normal.             An electronic signature was used to authenticate this note.     --RACHEL Villalobos - CNP

## 2021-07-19 ENCOUNTER — TELEPHONE (OUTPATIENT)
Dept: CARDIOTHORACIC SURGERY | Age: 51
End: 2021-07-19

## 2021-07-19 RX ORDER — AMIODARONE HYDROCHLORIDE 200 MG/1
200 TABLET ORAL ONCE
Status: CANCELLED | OUTPATIENT
Start: 2021-07-19 | End: 2021-07-19

## 2021-07-19 RX ORDER — SODIUM CHLORIDE 0.9 % (FLUSH) 0.9 %
10 SYRINGE (ML) INJECTION PRN
Status: CANCELLED | OUTPATIENT
Start: 2021-07-19

## 2021-07-19 RX ORDER — SODIUM CHLORIDE 9 MG/ML
25 INJECTION, SOLUTION INTRAVENOUS PRN
Status: CANCELLED | OUTPATIENT
Start: 2021-07-19

## 2021-07-19 RX ORDER — CHLORHEXIDINE GLUCONATE 0.12 MG/ML
15 RINSE ORAL ONCE
Status: CANCELLED | OUTPATIENT
Start: 2021-07-19 | End: 2021-07-19

## 2021-07-19 RX ORDER — CHLORHEXIDINE GLUCONATE 4 G/100ML
SOLUTION TOPICAL ONCE
Status: CANCELLED | OUTPATIENT
Start: 2021-07-19 | End: 2021-07-19

## 2021-07-19 RX ORDER — SODIUM CHLORIDE 0.9 % (FLUSH) 0.9 %
10 SYRINGE (ML) INJECTION EVERY 12 HOURS SCHEDULED
Status: CANCELLED | OUTPATIENT
Start: 2021-07-19

## 2021-07-19 ASSESSMENT — ENCOUNTER SYMPTOMS
WHEEZING: 0
EYE DISCHARGE: 0
SINUS PRESSURE: 0
DIARRHEA: 0
VOMITING: 0
COUGH: 0
TROUBLE SWALLOWING: 0
CHEST TIGHTNESS: 0
BLOOD IN STOOL: 0
ABDOMINAL DISTENTION: 0
SINUS PAIN: 0
BACK PAIN: 0
NAUSEA: 0
EYE REDNESS: 0
RECTAL PAIN: 0
EYE ITCHING: 0
SORE THROAT: 0
SHORTNESS OF BREATH: 0
ABDOMINAL PAIN: 0
EYE PAIN: 0
FACIAL SWELLING: 0
CONSTIPATION: 0
COLOR CHANGE: 0

## 2021-07-19 NOTE — PROGRESS NOTES
Called Dr. Gerardo Peña office and left message with [facility] requesting PAT orders

## 2021-07-22 NOTE — TELEPHONE ENCOUNTER
APPROVED    Authorization # [id number]  CPT code 39042 valid for 1 inpatient day  DOS: 7/27/21 - 7/27/21

## 2021-07-22 NOTE — TELEPHONE ENCOUNTER
Attempted to contact Pauline to check authorization status  Waited 1 hour and 6 minutes on hold  Message said \"due to unforeseen circumstances, the call center is now closed\"  LMOM to have someone call me back  Will attempt to call back again this afternoon

## 2021-07-22 NOTE — TELEPHONE ENCOUNTER
Spoke to ANNIE GUILLEN with [employer] [facility]  She informed me that the case is under review - which means that someone is actively reviewing it  She said I should have an answer by Monday due to DOS of 7/27/21  I will call back on Monday afternoon if I do not hear from them.

## 2021-07-23 ENCOUNTER — HOSPITAL ENCOUNTER (OUTPATIENT)
Dept: INTERVENTIONAL RADIOLOGY/VASCULAR | Age: 51
Discharge: HOME | End: 2021-07-23
Payer: COMMERCIAL

## 2021-07-23 ENCOUNTER — HOSPITAL ENCOUNTER (OUTPATIENT)
Dept: GENERAL RADIOLOGY | Age: 51
Discharge: HOME | End: 2021-07-23
Payer: COMMERCIAL

## 2021-07-23 ENCOUNTER — HOSPITAL ENCOUNTER (OUTPATIENT)
Dept: PREADMISSION TESTING | Age: 51
Discharge: HOME | End: 2021-07-27
Payer: COMMERCIAL

## 2021-07-23 ENCOUNTER — HOSPITAL ENCOUNTER (OUTPATIENT)
Dept: CT IMAGING | Age: 51
Discharge: HOME | End: 2021-07-23
Payer: COMMERCIAL

## 2021-07-23 VITALS
TEMPERATURE: 97.6 F | WEIGHT: 158.95 LBS | BODY MASS INDEX: 22.76 KG/M2 | SYSTOLIC BLOOD PRESSURE: 127 MMHG | HEART RATE: 86 BPM | HEIGHT: 70 IN | DIASTOLIC BLOOD PRESSURE: 64 MMHG | OXYGEN SATURATION: 100 % | RESPIRATION RATE: 16 BRPM

## 2021-07-23 DIAGNOSIS — I25.10 CAD, MULTIPLE VESSEL: ICD-10-CM

## 2021-07-23 DIAGNOSIS — Z01.818 PRE-OP EXAMINATION: ICD-10-CM

## 2021-07-23 DIAGNOSIS — Z01.818 PREOP TESTING: ICD-10-CM

## 2021-07-23 LAB
ABO: NORMAL
ANION GAP SERPL CALCULATED.3IONS-SCNC: 13 MEQ/L (ref 8–16)
ANTIBODY SCREEN: NORMAL
AVERAGE GLUCOSE: 117 MG/DL (ref 70–126)
BILIRUBIN URINE: NEGATIVE
BLOOD, URINE: NEGATIVE
BUN BLDV-MCNC: 7 MG/DL (ref 7–22)
CALCIUM SERPL-MCNC: 10.5 MG/DL (ref 8.5–10.5)
CHARACTER, URINE: CLEAR
CHLORIDE BLD-SCNC: 83 MEQ/L (ref 98–111)
CO2: 25 MEQ/L (ref 23–33)
COLOR: YELLOW
CREAT SERPL-MCNC: 0.9 MG/DL (ref 0.4–1.2)
ERYTHROCYTE [DISTWIDTH] IN BLOOD BY AUTOMATED COUNT: 12 % (ref 11.5–14.5)
ERYTHROCYTE [DISTWIDTH] IN BLOOD BY AUTOMATED COUNT: 42.2 FL (ref 35–45)
GFR SERPL CREATININE-BSD FRML MDRD: 88 ML/MIN/1.73M2
GLUCOSE BLD-MCNC: 130 MG/DL (ref 70–108)
GLUCOSE, URINE: NEGATIVE MG/DL
HBA1C MFR BLD: 5.9 % (ref 4.4–6.4)
HCT VFR BLD CALC: 30.3 % (ref 42–52)
HEMOGLOBIN: 10.8 GM/DL (ref 14–18)
INR BLD: 0.93 (ref 0.85–1.13)
KETONES, URINE: NEGATIVE
LEUKOCYTE EST, POC: NEGATIVE
MCH RBC QN AUTO: 34.2 PG (ref 26–33)
MCHC RBC AUTO-ENTMCNC: 35.6 GM/DL (ref 32.2–35.5)
MCV RBC AUTO: 95.9 FL (ref 80–94)
MRSA NASAL SCREEN RT-PCR: NEGATIVE
NITRITE, URINE: NEGATIVE
PH UA: 6 (ref 5–9)
PLATELET # BLD: 214 THOU/MM3 (ref 130–400)
PMV BLD AUTO: 9.2 FL (ref 9.4–12.4)
POTASSIUM SERPL-SCNC: 5.1 MEQ/L (ref 3.5–5.2)
PROTEIN UA: NEGATIVE MG/DL
RBC # BLD: 3.16 MILL/MM3 (ref 4.7–6.1)
RH FACTOR: NORMAL
SODIUM BLD-SCNC: 121 MEQ/L (ref 135–145)
SPECIFIC GRAVITY UA: <= 1.005 (ref 1–1.03)
STAPH AUREUS SCREEN RT-PCR: NEGATIVE
UROBILINOGEN, URINE: 0.2 EU/DL (ref 0–1)
WBC # BLD: 4.5 THOU/MM3 (ref 4.8–10.8)

## 2021-07-23 PROCEDURE — 87641 MR-STAPH DNA AMP PROBE: CPT

## 2021-07-23 PROCEDURE — 93880 EXTRACRANIAL BILAT STUDY: CPT

## 2021-07-23 PROCEDURE — 80048 BASIC METABOLIC PNL TOTAL CA: CPT

## 2021-07-23 PROCEDURE — 85610 PROTHROMBIN TIME: CPT

## 2021-07-23 PROCEDURE — 86900 BLOOD TYPING SEROLOGIC ABO: CPT

## 2021-07-23 PROCEDURE — 93971 EXTREMITY STUDY: CPT

## 2021-07-23 PROCEDURE — 71250 CT THORAX DX C-: CPT

## 2021-07-23 PROCEDURE — 87640 STAPH A DNA AMP PROBE: CPT

## 2021-07-23 PROCEDURE — 83036 HEMOGLOBIN GLYCOSYLATED A1C: CPT

## 2021-07-23 PROCEDURE — 86850 RBC ANTIBODY SCREEN: CPT

## 2021-07-23 PROCEDURE — 86923 COMPATIBILITY TEST ELECTRIC: CPT

## 2021-07-23 PROCEDURE — 81003 URINALYSIS AUTO W/O SCOPE: CPT

## 2021-07-23 PROCEDURE — 87081 CULTURE SCREEN ONLY: CPT

## 2021-07-23 PROCEDURE — 86901 BLOOD TYPING SEROLOGIC RH(D): CPT

## 2021-07-23 PROCEDURE — 85027 COMPLETE CBC AUTOMATED: CPT

## 2021-07-23 PROCEDURE — 36415 COLL VENOUS BLD VENIPUNCTURE: CPT

## 2021-07-23 PROCEDURE — 71046 X-RAY EXAM CHEST 2 VIEWS: CPT

## 2021-07-23 ASSESSMENT — PAIN SCALES - GENERAL: PAINLEVEL_OUTOF10: 8

## 2021-07-23 ASSESSMENT — PAIN DESCRIPTION - PAIN TYPE: TYPE: CHRONIC PAIN

## 2021-07-23 ASSESSMENT — PAIN DESCRIPTION - ORIENTATION: ORIENTATION: LOWER

## 2021-07-23 ASSESSMENT — PAIN DESCRIPTION - LOCATION: LOCATION: BACK

## 2021-07-23 NOTE — PROGRESS NOTES
Exercise Physiology  Pre OHS Exercise Instruction & 5 Meter Walk Test    Pre OHS exercise education given:  Yes    Sternal precautions reviewed:  Yes    Phase 2 Cardiac Rehab post surgery discussed:  Yes    Where does patient prefer to do Phase 2 Cardiac Rehab:  [facility]    Orthopedic issues:  Yes   *If yes, please list:  Back issues.     5 Meter Walk Test completed:  No

## 2021-07-23 NOTE — PROGRESS NOTES
NPO after midnight  [employer] and drivers license  Wear comfortable clean clothing  Do not bring jewelry  Bring medications in original bottles  Routine preop instructions given for pain, hand hygiene, fall prevention, infection prevention, anesthesia, cough and deep breath, and progressive diet and ambulation  Shower and wash hair with chlorhexidine soap morning of surgery  IS instruction given and return demonstration  Reviewed information in open heart booklet and questions answered  To help prevent bowel problems after surgery we ask that you drink a hot beverage and eat an Activia yogurt with each meal starting 3-4 days before surgery.   Viewed open heart video  Exercise physiology in and instructed pt  IS 2500  Follow all instructions give by your physician     needed at discharge  Report to SDS on 2nd floor  If you would become ill prior to surgery, please call the surgeon  Please bring and wear mask

## 2021-07-23 NOTE — PROGRESS NOTES
Preliminary Discharge Planning Questionnaire  Date of Surgery 7/ 27 Surgeon ricardo      · Having the proper help and care after surgery is very important to your recovery. Who will be able to help you at home when you are discharged from the hospital? ([facility] - 24/7 for a minimum of 2 weeks)    son    Name(s) pedro    · How many steps to enter your home? 4    · Bathroom on first floor? Yes    · Bedroom on the first floor? Yes    · Do you have an elevated toilet seat to use at home? No    · Do you have a walker to use at home? · Total Joints - with wheels N/A   · Spine - with wheels  N/A     Have you been doing home exercises? *You will go home with some outpatient physical therapy, where do you prefer to go?  Used [place] when he had his stroke    *If needed, what home health agency would you like to use?  unsure    [facility]

## 2021-07-23 NOTE — PROGRESS NOTES
Pain-see flow sheet. Patient educated on the 0-10 pain scale. Handout given in the instructional heart folder. Patient seems distracted during visit. Patient verbalizes understanding of instructions but did not ask any questions related to his surgery. Patient stated he did not wish to take the open heart book home. States he educated himself using the internet. Instructed he should look at the info in the booklet and booklet sent home with him.

## 2021-07-23 NOTE — PROGRESS NOTES
In preparation for their surgical procedure above patient was screened for Obstructive Sleep Apnea (RICARDO) using the STOP-Bang Questionnaire by the Pre-Admission Testing department. This is a pre-surgical screening tool for patient safety and serves as a recommendation, this WILL NOT cause cancellation of surgery. STOP-Bang Questionnaire  * Do you currently see a pulmonologist?  No     If yes STOP, do not complete. Patient follows with  .    1. Do you snore loudly (able to be heard in the next room)? Yes    2. Do you often feel tired or sleepy during the daytime? No       3. Has anyone ever told you that you stop breathing during your sleep? No    4. Do you have or are you being treated for high blood pressure? Yes      5. BMI more than 35? BMI (Calculated): 22.5        No    6. Age over 48 years? 46 y.o. Yes    7. Neck Circumference greater than 17 inches for male or 16 inches for female? Measured           (visits only)            Not Applicable    8. Gender Male? No      TOTAL SCORE: 3    RICARDO - Low Risk : Yes to 0 - 2 questions  RICARDO - Intermediate Risk : Yes to 3 - 4 questions  RICARDO - High Risk : Yes to 5 - 8 questions    Adapted from:   STOP Questionnaire: A Tool to Screen Patients for Obstructive Sleep Apnea   JUSTYNA Krause.C.P.C., Marlo Rascon M.B.B.S., Nani Mendoza M.D., Marielena Díaz. Ranjit Barkley, Ph.D., STANISLAV Dunne.B.B.S., Aurora Munoz, M.Sc., Petra Jason M.D., Nadira Ibanez. ANDERSON DenisC.P.C.    Anesthesiology 2008; 478:760-81 Copyright 2008, the [address] of [employer]est[employer], [facility].   ----------------------------------------------------------------------------------------------------------------

## 2021-07-25 LAB — VRE CULTURE: NORMAL

## 2021-07-26 PROCEDURE — APPSS30 APP SPLIT SHARED TIME 16-30 MINUTES: Performed by: PHYSICIAN ASSISTANT

## 2021-07-26 NOTE — PROGRESS NOTES
[facility] Adult Cardiac Surgery Database Version 4.20  RISK SCORES    Procedure: Isolated CABG    Risk of Mortality:  0.443%  Renal Failure:  0.495%  Permanent Stroke:  0.485%  Prolonged Ventilation:  2.149%  DSW Infection:  0.115%  Reoperation:  2.235%  Morbidity or Mortality:  4.367%  Short Length of Stay:  68.806%  Long Length of Stay:  1.100%

## 2021-07-29 ENCOUNTER — TELEPHONE (OUTPATIENT)
Dept: CARDIOLOGY CLINIC | Age: 51
End: 2021-07-29

## 2021-08-02 ENCOUNTER — ANESTHESIA EVENT (OUTPATIENT)
Dept: OPERATING ROOM | Age: 51
DRG: 234 | End: 2021-08-02
Payer: COMMERCIAL

## 2021-08-02 ENCOUNTER — TELEPHONE (OUTPATIENT)
Dept: CARDIOLOGY CLINIC | Age: 51
End: 2021-08-02

## 2021-08-03 ENCOUNTER — APPOINTMENT (OUTPATIENT)
Dept: GENERAL RADIOLOGY | Age: 51
DRG: 234 | End: 2021-08-03
Attending: THORACIC SURGERY (CARDIOTHORACIC VASCULAR SURGERY)
Payer: COMMERCIAL

## 2021-08-03 ENCOUNTER — ANESTHESIA (OUTPATIENT)
Dept: OPERATING ROOM | Age: 51
DRG: 234 | End: 2021-08-03
Payer: COMMERCIAL

## 2021-08-03 ENCOUNTER — HOSPITAL ENCOUNTER (INPATIENT)
Age: 51
LOS: 6 days | Discharge: HOME | DRG: 234 | End: 2021-08-09
Attending: THORACIC SURGERY (CARDIOTHORACIC VASCULAR SURGERY) | Admitting: THORACIC SURGERY (CARDIOTHORACIC VASCULAR SURGERY)
Payer: COMMERCIAL

## 2021-08-03 VITALS — OXYGEN SATURATION: 100 % | DIASTOLIC BLOOD PRESSURE: 46 MMHG | SYSTOLIC BLOOD PRESSURE: 72 MMHG | TEMPERATURE: 95.9 F

## 2021-08-03 DIAGNOSIS — I25.10 CAD, MULTIPLE VESSEL: Primary | ICD-10-CM

## 2021-08-03 LAB
ABO: NORMAL
ACT TEG: 128 SECONDS (ref 86–118)
ALLEN TEST: ABNORMAL
ALLEN TEST: ABNORMAL
ALLEN TEST: NORMAL
ANGLE, RAPID TEG: 67.9 DEG (ref 64–80)
ANION GAP SERPL CALCULATED.3IONS-SCNC: 13 MEQ/L (ref 8–16)
ANTIBODY SCREEN: NORMAL
BASE EXCESS (CALCULATED): -0.5 MMOL/L (ref -2.5–2.5)
BASE EXCESS (CALCULATED): -0.9 MMOL/L (ref -2.5–2.5)
BASE EXCESS (CALCULATED): -1.3 MMOL/L (ref -2.5–2.5)
BASE EXCESS (CALCULATED): -1.3 MMOL/L (ref -2.5–2.5)
BASE EXCESS (CALCULATED): -1.5 MMOL/L (ref -2.5–2.5)
BASE EXCESS (CALCULATED): -1.5 MMOL/L (ref -2.5–2.5)
BASE EXCESS (CALCULATED): 2.4 MMOL/L (ref -2.5–2.5)
BASE EXCESS (CALCULATED): 4.5 MMOL/L (ref -2.5–2.5)
BASE EXCESS MIXED: -0.9 MMOL/L (ref -2–3)
BUN BLDV-MCNC: 6 MG/DL (ref 7–22)
CALCIUM IONIZED SERUM: 0.94 MMOL/L (ref 1.12–1.32)
CALCIUM IONIZED SERUM: 1.01 MMOL/L (ref 1.12–1.32)
CALCIUM IONIZED SERUM: 1.16 MMOL/L (ref 1.12–1.32)
CALCIUM IONIZED SERUM: 1.18 MMOL/L (ref 1.12–1.32)
CALCIUM IONIZED SERUM: 1.25 MMOL/L (ref 1.12–1.32)
CALCIUM IONIZED: 1.05 MMOL/L (ref 1.12–1.32)
CALCIUM SERPL-MCNC: 8 MG/DL (ref 8.5–10.5)
CHLORIDE BLD-SCNC: 94 MEQ/L (ref 98–111)
CO2: 23 MEQ/L (ref 23–33)
COLLECTED BY:: ABNORMAL
COLLECTED BY:: NORMAL
COMMENT: ABNORMAL
CREAT SERPL-MCNC: 0.7 MG/DL (ref 0.4–1.2)
DEVICE: ABNORMAL
DEVICE: ABNORMAL
DEVICE: NORMAL
EKG Q-T INTERVAL: 420 MS
EKG QRS DURATION: 88 MS
EKG QTC CALCULATION (BAZETT): 453 MS
EKG R AXIS: 13 DEGREES
EKG T AXIS: -12 DEGREES
EKG VENTRICULAR RATE: 70 BPM
EPL-TEG: 0.4 % (ref 0–15)
ERYTHROCYTE [DISTWIDTH] IN BLOOD BY AUTOMATED COUNT: 14.5 % (ref 11.5–14.5)
ERYTHROCYTE [DISTWIDTH] IN BLOOD BY AUTOMATED COUNT: 50.9 FL (ref 35–45)
GFR SERPL CREATININE-BSD FRML MDRD: > 90 ML/MIN/1.73M2
GLUCOSE BLD-MCNC: 103 MG/DL (ref 70–108)
GLUCOSE BLD-MCNC: 104 MG/DL (ref 70–108)
GLUCOSE BLD-MCNC: 122 MG/DL (ref 70–108)
GLUCOSE BLD-MCNC: 123 MG/DL (ref 70–108)
GLUCOSE BLD-MCNC: 139 MG/DL (ref 70–108)
GLUCOSE BLD-MCNC: 143 MG/DL (ref 70–108)
GLUCOSE BLD-MCNC: 169 MG/DL (ref 70–108)
GLUCOSE BLD-MCNC: 67 MG/DL (ref 70–108)
GLUCOSE BLD-MCNC: 91 MG/DL (ref 70–108)
GLUCOSE, WHOLE BLOOD: 113 MG/DL (ref 70–108)
GLUCOSE, WHOLE BLOOD: 120 MG/DL (ref 70–108)
GLUCOSE, WHOLE BLOOD: 149 MG/DL (ref 70–108)
GLUCOSE, WHOLE BLOOD: 157 MG/DL (ref 70–108)
GLUCOSE, WHOLE BLOOD: 159 MG/DL (ref 70–108)
GLUCOSE, WHOLE BLOOD: 66 MG/DL (ref 70–108)
GLUCOSE, WHOLE BLOOD: 84 MG/DL (ref 70–108)
GLUCOSE, WHOLE BLOOD: 99 MG/DL (ref 70–108)
HCO3, MIXED: 24 MMOL/L (ref 23–28)
HCO3: 22 MMOL/L (ref 23–28)
HCO3: 23 MMOL/L (ref 23–28)
HCO3: 23 MMOL/L (ref 23–28)
HCO3: 24 MMOL/L (ref 23–28)
HCO3: 25 MMOL/L (ref 23–28)
HCO3: 25 MMOL/L (ref 23–28)
HCO3: 27 MMOL/L (ref 23–28)
HCO3: 28 MMOL/L (ref 23–28)
HCT VFR BLD CALC: 19.2 % (ref 42–52)
HCT VFR BLD CALC: 22.9 % (ref 42–52)
HCT VFR BLD CALC: 23.4 % (ref 42–52)
HCT VFR BLD CALC: 23.6 % (ref 42–52)
HEMOGLOBIN FINGERSTICK, POC: 5 G/DL (ref 14–18)
HEMOGLOBIN FINGERSTICK, POC: 6.4 G/DL (ref 14–18)
HEMOGLOBIN FINGERSTICK, POC: 6.7 G/DL (ref 14–18)
HEMOGLOBIN FINGERSTICK, POC: 6.8 G/DL (ref 14–18)
HEMOGLOBIN FINGERSTICK, POC: 7 G/DL (ref 14–18)
HEMOGLOBIN: 6.8 GM/DL (ref 14–18)
HEMOGLOBIN: 7.8 GM/DL (ref 14–18)
HEMOGLOBIN: 8 GM/DL (ref 14–18)
HEMOGLOBIN: 8.6 GM/DL (ref 14–18)
HEPARIN THERAPY: NO
IFIO2: 40
IFIO2: 40
IFIO2: 80
KINETICS RAPID TEG: 2.4 MINUTES (ref 0.5–2.3)
LY30 (LYSIS) TEG: 0.4 % (ref 0–7.5)
MA(MAX CLOT) RAPID TEG: 50.1 MM (ref 52–71)
MAGNESIUM: 3.3 MG/DL (ref 1.6–2.4)
MCH RBC QN AUTO: 33.2 PG (ref 26–33)
MCHC RBC AUTO-ENTMCNC: 34.1 GM/DL (ref 32.2–35.5)
MCV RBC AUTO: 97.4 FL (ref 80–94)
MODE: ABNORMAL
MODE: ABNORMAL
MODE: NORMAL
O2 SAT, MIXED: 69 %
O2 SATURATION: 100 %
O2 SATURATION: 97 %
O2 SATURATION: 98 %
PCO2, MIXED VENOUS: 37 MMHG (ref 41–51)
PCO2: 31 MMHG (ref 35–45)
PCO2: 37 MMHG (ref 35–45)
PCO2: 37 MMHG (ref 35–45)
PCO2: 38 MMHG (ref 35–45)
PCO2: 43 MMHG (ref 35–45)
PCO2: 44 MMHG (ref 35–45)
PH BLOOD GAS: 7.35 (ref 7.35–7.45)
PH BLOOD GAS: 7.36 (ref 7.35–7.45)
PH BLOOD GAS: 7.36 (ref 7.35–7.45)
PH BLOOD GAS: 7.4 (ref 7.35–7.45)
PH BLOOD GAS: 7.41 (ref 7.35–7.45)
PH BLOOD GAS: 7.41 (ref 7.35–7.45)
PH BLOOD GAS: 7.46 (ref 7.35–7.45)
PH BLOOD GAS: 7.48 (ref 7.35–7.45)
PH, MIXED: 7.41 (ref 7.31–7.41)
PIP: 24 CMH2O
PLATELET # BLD: 188 THOU/MM3 (ref 130–400)
PLATELET # BLD: 92 THOU/MM3 (ref 130–400)
PLATELET # BLD: 96 THOU/MM3 (ref 130–400)
PMV BLD AUTO: 8.1 FL (ref 9.4–12.4)
PO2 MIXED: 36 MMHG (ref 25–40)
PO2: 185 MMHG (ref 71–104)
PO2: 413 MMHG (ref 71–104)
PO2: 453 MMHG (ref 71–104)
PO2: 505 MMHG (ref 71–104)
PO2: 529 MMHG (ref 71–104)
PO2: 577 MMHG (ref 71–104)
PO2: 86 MMHG (ref 71–104)
PO2: 96 MMHG (ref 71–104)
POC ACTIVATED CLOTTING TIME KAOLIN: 120 SECONDS (ref 1–150)
POC ACTIVATED CLOTTING TIME KAOLIN: 120 SECONDS (ref 1–150)
POC ACTIVATED CLOTTING TIME KAOLIN: 544 SECONDS (ref 1–150)
POC ACTIVATED CLOTTING TIME KAOLIN: 626 SECONDS (ref 1–150)
POC ACTIVATED CLOTTING TIME KAOLIN: 632 SECONDS (ref 1–150)
POC ACTIVATED CLOTTING TIME KAOLIN: 907 SECONDS (ref 1–150)
POTASSIUM SERPL-SCNC: 3.6 MEQ/L (ref 3.5–5.2)
POTASSIUM SERPL-SCNC: 4.4 MEQ/L (ref 3.5–5.2)
POTASSIUM, WHOLE BLOOD: 4.4 MEQ/L (ref 3.5–4.9)
POTASSIUM, WHOLE BLOOD: 5.4 MEQ/L (ref 3.5–4.9)
POTASSIUM, WHOLE BLOOD: 5.5 MEQ/L (ref 3.5–4.9)
RBC # BLD: 2.35 MILL/MM3 (ref 4.7–6.1)
REACTION TIME RAPID TEG: 0.8 MINUTES (ref 0.4–1)
RH FACTOR: NORMAL
SCAN OF BLOOD SMEAR: NORMAL
SCAN OF BLOOD SMEAR: NORMAL
SET PEEP: 6 MMHG
SET PRESS SUPP: 10 CMH2O
SET PRESS SUPP: 18 CMH2O
SET RESPIRATORY RATE: 16 BPM
SET RESPIRATORY RATE: 16 BPM
SODIUM BLD-SCNC: 130 MEQ/L (ref 135–145)
SODIUM, WHOLE BLOOD: 125 MEQ/L (ref 138–146)
SODIUM, WHOLE BLOOD: 127 MEQ/L (ref 138–146)
SODIUM, WHOLE BLOOD: 130 MEQ/L (ref 138–146)
SODIUM, WHOLE BLOOD: 130 MEQ/L (ref 138–146)
SODIUM, WHOLE BLOOD: 131 MEQ/L (ref 138–146)
SOURCE, BLOOD GAS: ABNORMAL
SOURCE, BLOOD GAS: ABNORMAL
SOURCE, BLOOD GAS: NORMAL
WBC # BLD: 4 THOU/MM3 (ref 4.8–10.8)

## 2021-08-03 PROCEDURE — 3700000000 HC ANESTHESIA ATTENDED CARE: Performed by: THORACIC SURGERY (CARDIOTHORACIC VASCULAR SURGERY)

## 2021-08-03 PROCEDURE — 6360000002 HC RX W HCPCS: Performed by: THORACIC SURGERY (CARDIOTHORACIC VASCULAR SURGERY)

## 2021-08-03 PROCEDURE — 86850 RBC ANTIBODY SCREEN: CPT

## 2021-08-03 PROCEDURE — C9248 INJ, CLEVIDIPINE BUTYRATE: HCPCS

## 2021-08-03 PROCEDURE — 6370000000 HC RX 637 (ALT 250 FOR IP): Performed by: THORACIC SURGERY (CARDIOTHORACIC VASCULAR SURGERY)

## 2021-08-03 PROCEDURE — 6360000002 HC RX W HCPCS

## 2021-08-03 PROCEDURE — 86923 COMPATIBILITY TEST ELECTRIC: CPT

## 2021-08-03 PROCEDURE — 80048 BASIC METABOLIC PNL TOTAL CA: CPT

## 2021-08-03 PROCEDURE — 94660 CPAP INITIATION&MGMT: CPT

## 2021-08-03 PROCEDURE — 33508 ENDOSCOPIC VEIN HARVEST: CPT | Performed by: THORACIC SURGERY (CARDIOTHORACIC VASCULAR SURGERY)

## 2021-08-03 PROCEDURE — 83735 ASSAY OF MAGNESIUM: CPT

## 2021-08-03 PROCEDURE — 82803 BLOOD GASES ANY COMBINATION: CPT

## 2021-08-03 PROCEDURE — 2720000010 HC SURG SUPPLY STERILE: Performed by: THORACIC SURGERY (CARDIOTHORACIC VASCULAR SURGERY)

## 2021-08-03 PROCEDURE — B2111ZZ FLUOROSCOPY OF MULTIPLE CORONARY ARTERIES USING LOW OSMOLAR CONTRAST: ICD-10-PCS | Performed by: THORACIC SURGERY (CARDIOTHORACIC VASCULAR SURGERY)

## 2021-08-03 PROCEDURE — 94002 VENT MGMT INPAT INIT DAY: CPT

## 2021-08-03 PROCEDURE — 37799 UNLISTED PX VASCULAR SURGERY: CPT

## 2021-08-03 PROCEDURE — 2709999900 HC NON-CHARGEABLE SUPPLY: Performed by: THORACIC SURGERY (CARDIOTHORACIC VASCULAR SURGERY)

## 2021-08-03 PROCEDURE — 84132 ASSAY OF SERUM POTASSIUM: CPT

## 2021-08-03 PROCEDURE — C1729 CATH, DRAINAGE: HCPCS | Performed by: THORACIC SURGERY (CARDIOTHORACIC VASCULAR SURGERY)

## 2021-08-03 PROCEDURE — 3600000008 HC SURGERY OHS BASE: Performed by: THORACIC SURGERY (CARDIOTHORACIC VASCULAR SURGERY)

## 2021-08-03 PROCEDURE — 3600000018 HC SURGERY OHS ADDTL 15MIN: Performed by: THORACIC SURGERY (CARDIOTHORACIC VASCULAR SURGERY)

## 2021-08-03 PROCEDURE — 85018 HEMOGLOBIN: CPT

## 2021-08-03 PROCEDURE — 2580000003 HC RX 258: Performed by: THORACIC SURGERY (CARDIOTHORACIC VASCULAR SURGERY)

## 2021-08-03 PROCEDURE — 021109W BYPASS CORONARY ARTERY, TWO ARTERIES FROM AORTA WITH AUTOLOGOUS VENOUS TISSUE, OPEN APPROACH: ICD-10-PCS | Performed by: THORACIC SURGERY (CARDIOTHORACIC VASCULAR SURGERY)

## 2021-08-03 PROCEDURE — 06BP4ZZ EXCISION OF RIGHT SAPHENOUS VEIN, PERCUTANEOUS ENDOSCOPIC APPROACH: ICD-10-PCS | Performed by: THORACIC SURGERY (CARDIOTHORACIC VASCULAR SURGERY)

## 2021-08-03 PROCEDURE — 86901 BLOOD TYPING SEROLOGIC RH(D): CPT

## 2021-08-03 PROCEDURE — C9248 INJ, CLEVIDIPINE BUTYRATE: HCPCS | Performed by: THORACIC SURGERY (CARDIOTHORACIC VASCULAR SURGERY)

## 2021-08-03 PROCEDURE — 33518 CABG ARTERY-VEIN TWO: CPT | Performed by: THORACIC SURGERY (CARDIOTHORACIC VASCULAR SURGERY)

## 2021-08-03 PROCEDURE — P9045 ALBUMIN (HUMAN), 5%, 250 ML: HCPCS

## 2021-08-03 PROCEDURE — 33533 CABG ARTERIAL SINGLE: CPT | Performed by: THORACIC SURGERY (CARDIOTHORACIC VASCULAR SURGERY)

## 2021-08-03 PROCEDURE — 02100Z9 BYPASS CORONARY ARTERY, ONE ARTERY FROM LEFT INTERNAL MAMMARY, OPEN APPROACH: ICD-10-PCS | Performed by: THORACIC SURGERY (CARDIOTHORACIC VASCULAR SURGERY)

## 2021-08-03 PROCEDURE — 82330 ASSAY OF CALCIUM: CPT

## 2021-08-03 PROCEDURE — 87086 URINE CULTURE/COLONY COUNT: CPT

## 2021-08-03 PROCEDURE — P9041 ALBUMIN (HUMAN),5%, 50ML: HCPCS | Performed by: THORACIC SURGERY (CARDIOTHORACIC VASCULAR SURGERY)

## 2021-08-03 PROCEDURE — 82947 ASSAY GLUCOSE BLOOD QUANT: CPT

## 2021-08-03 PROCEDURE — 6370000000 HC RX 637 (ALT 250 FOR IP): Performed by: PHYSICIAN ASSISTANT

## 2021-08-03 PROCEDURE — 2000000000 HC ICU R&B

## 2021-08-03 PROCEDURE — 2580000003 HC RX 258

## 2021-08-03 PROCEDURE — 6360000002 HC RX W HCPCS: Performed by: PHYSICIAN ASSISTANT

## 2021-08-03 PROCEDURE — 2500000003 HC RX 250 WO HCPCS

## 2021-08-03 PROCEDURE — 6370000000 HC RX 637 (ALT 250 FOR IP)

## 2021-08-03 PROCEDURE — P9047 ALBUMIN (HUMAN), 25%, 50ML: HCPCS

## 2021-08-03 PROCEDURE — 3700000001 HC ADD 15 MINUTES (ANESTHESIA): Performed by: THORACIC SURGERY (CARDIOTHORACIC VASCULAR SURGERY)

## 2021-08-03 PROCEDURE — 36415 COLL VENOUS BLD VENIPUNCTURE: CPT

## 2021-08-03 PROCEDURE — P9016 RBC LEUKOCYTES REDUCED: HCPCS

## 2021-08-03 PROCEDURE — 36430 TRANSFUSION BLD/BLD COMPNT: CPT

## 2021-08-03 PROCEDURE — 5A1223Z PERFORMANCE OF CARDIAC PACING, CONTINUOUS: ICD-10-PCS | Performed by: THORACIC SURGERY (CARDIOTHORACIC VASCULAR SURGERY)

## 2021-08-03 PROCEDURE — 84295 ASSAY OF SERUM SODIUM: CPT

## 2021-08-03 PROCEDURE — 93005 ELECTROCARDIOGRAM TRACING: CPT | Performed by: THORACIC SURGERY (CARDIOTHORACIC VASCULAR SURGERY)

## 2021-08-03 PROCEDURE — 85347 COAGULATION TIME ACTIVATED: CPT

## 2021-08-03 PROCEDURE — 76998 US GUIDE INTRAOP: CPT | Performed by: THORACIC SURGERY (CARDIOTHORACIC VASCULAR SURGERY)

## 2021-08-03 PROCEDURE — 2500000003 HC RX 250 WO HCPCS: Performed by: THORACIC SURGERY (CARDIOTHORACIC VASCULAR SURGERY)

## 2021-08-03 PROCEDURE — 85027 COMPLETE CBC AUTOMATED: CPT

## 2021-08-03 PROCEDURE — 5A1221Z PERFORMANCE OF CARDIAC OUTPUT, CONTINUOUS: ICD-10-PCS | Performed by: THORACIC SURGERY (CARDIOTHORACIC VASCULAR SURGERY)

## 2021-08-03 PROCEDURE — 85014 HEMATOCRIT: CPT

## 2021-08-03 PROCEDURE — 71045 X-RAY EXAM CHEST 1 VIEW: CPT

## 2021-08-03 PROCEDURE — 36620 INSERTION CATHETER ARTERY: CPT

## 2021-08-03 PROCEDURE — 86900 BLOOD TYPING SEROLOGIC ABO: CPT

## 2021-08-03 PROCEDURE — 82948 REAGENT STRIP/BLOOD GLUCOSE: CPT

## 2021-08-03 PROCEDURE — 94761 N-INVAS EAR/PLS OXIMETRY MLT: CPT

## 2021-08-03 PROCEDURE — B24BZZ4 ULTRASONOGRAPHY OF HEART WITH AORTA, TRANSESOPHAGEAL: ICD-10-PCS | Performed by: ANESTHESIOLOGY

## 2021-08-03 PROCEDURE — 2700000000 HC OXYGEN THERAPY PER DAY

## 2021-08-03 PROCEDURE — 05HM33Z INSERTION OF INFUSION DEVICE INTO RIGHT INTERNAL JUGULAR VEIN, PERCUTANEOUS APPROACH: ICD-10-PCS | Performed by: ANESTHESIOLOGY

## 2021-08-03 RX ORDER — NICOTINE POLACRILEX 4 MG
15 LOZENGE BUCCAL PRN
Status: DISCONTINUED | OUTPATIENT
Start: 2021-08-03 | End: 2021-08-04

## 2021-08-03 RX ORDER — SODIUM CHLORIDE, SODIUM GLUCONATE, SODIUM ACETATE, POTASSIUM CHLORIDE AND MAGNESIUM CHLORIDE 526; 502; 368; 37; 30 MG/100ML; MG/100ML; MG/100ML; MG/100ML; MG/100ML
INJECTION, SOLUTION INTRAVENOUS CONTINUOUS PRN
Status: DISCONTINUED | OUTPATIENT
Start: 2021-08-03 | End: 2021-08-03 | Stop reason: SDUPTHER

## 2021-08-03 RX ORDER — ROCURONIUM BROMIDE 10 MG/ML
INJECTION, SOLUTION INTRAVENOUS PRN
Status: DISCONTINUED | OUTPATIENT
Start: 2021-08-03 | End: 2021-08-03 | Stop reason: SDUPTHER

## 2021-08-03 RX ORDER — PAPAVERINE HYDROCHLORIDE 30 MG/ML
INJECTION INTRAMUSCULAR; INTRAVENOUS PRN
Status: DISCONTINUED | OUTPATIENT
Start: 2021-08-03 | End: 2021-08-03 | Stop reason: HOSPADM

## 2021-08-03 RX ORDER — CHLORHEXIDINE GLUCONATE 4 G/100ML
SOLUTION TOPICAL ONCE
Status: DISCONTINUED | OUTPATIENT
Start: 2021-08-03 | End: 2021-08-03 | Stop reason: HOSPADM

## 2021-08-03 RX ORDER — DEXTROSE MONOHYDRATE 25 G/50ML
12.5 INJECTION, SOLUTION INTRAVENOUS PRN
Status: DISCONTINUED | OUTPATIENT
Start: 2021-08-03 | End: 2021-08-04

## 2021-08-03 RX ORDER — OXYCODONE HYDROCHLORIDE 5 MG/1
5 TABLET ORAL EVERY 4 HOURS PRN
Status: DISCONTINUED | OUTPATIENT
Start: 2021-08-03 | End: 2021-08-09 | Stop reason: HOSPADM

## 2021-08-03 RX ORDER — MIDAZOLAM HYDROCHLORIDE 1 MG/ML
INJECTION INTRAMUSCULAR; INTRAVENOUS PRN
Status: DISCONTINUED | OUTPATIENT
Start: 2021-08-03 | End: 2021-08-03 | Stop reason: SDUPTHER

## 2021-08-03 RX ORDER — AMINOCAPROIC ACID 250 MG/ML
INJECTION, SOLUTION INTRAVENOUS PRN
Status: DISCONTINUED | OUTPATIENT
Start: 2021-08-03 | End: 2021-08-03 | Stop reason: SDUPTHER

## 2021-08-03 RX ORDER — CHLORHEXIDINE GLUCONATE 0.12 MG/ML
15 RINSE ORAL ONCE
Status: COMPLETED | OUTPATIENT
Start: 2021-08-03 | End: 2021-08-03

## 2021-08-03 RX ORDER — LIDOCAINE HCL/PF 100 MG/5ML
SYRINGE (ML) INJECTION PRN
Status: DISCONTINUED | OUTPATIENT
Start: 2021-08-03 | End: 2021-08-03 | Stop reason: SDUPTHER

## 2021-08-03 RX ORDER — ENALAPRILAT 2.5 MG/2ML
0.62 INJECTION INTRAVENOUS
Status: DISCONTINUED | OUTPATIENT
Start: 2021-08-03 | End: 2021-08-04

## 2021-08-03 RX ORDER — SODIUM CHLORIDE 9 MG/ML
INJECTION, SOLUTION INTRAVENOUS CONTINUOUS
Status: DISCONTINUED | OUTPATIENT
Start: 2021-08-03 | End: 2021-08-04

## 2021-08-03 RX ORDER — HEPARIN SODIUM 1000 [USP'U]/ML
INJECTION, SOLUTION INTRAVENOUS; SUBCUTANEOUS PRN
Status: DISCONTINUED | OUTPATIENT
Start: 2021-08-03 | End: 2021-08-03 | Stop reason: SDUPTHER

## 2021-08-03 RX ORDER — PROPOFOL 10 MG/ML
INJECTION, EMULSION INTRAVENOUS PRN
Status: DISCONTINUED | OUTPATIENT
Start: 2021-08-03 | End: 2021-08-03 | Stop reason: SDUPTHER

## 2021-08-03 RX ORDER — ACETAMINOPHEN 650 MG/1
650 SUPPOSITORY RECTAL EVERY 4 HOURS PRN
Status: DISCONTINUED | OUTPATIENT
Start: 2021-08-03 | End: 2021-08-04

## 2021-08-03 RX ORDER — MORPHINE SULFATE 2 MG/ML
2 INJECTION, SOLUTION INTRAMUSCULAR; INTRAVENOUS
Status: DISCONTINUED | OUTPATIENT
Start: 2021-08-03 | End: 2021-08-04

## 2021-08-03 RX ORDER — ONDANSETRON 2 MG/ML
4 INJECTION INTRAMUSCULAR; INTRAVENOUS EVERY 6 HOURS PRN
Status: DISCONTINUED | OUTPATIENT
Start: 2021-08-03 | End: 2021-08-09 | Stop reason: HOSPADM

## 2021-08-03 RX ORDER — ACETAMINOPHEN 325 MG/1
650 TABLET ORAL EVERY 4 HOURS PRN
Status: DISCONTINUED | OUTPATIENT
Start: 2021-08-03 | End: 2021-08-09 | Stop reason: HOSPADM

## 2021-08-03 RX ORDER — PROTAMINE SULFATE 10 MG/ML
50 INJECTION, SOLUTION INTRAVENOUS
Status: ACTIVE | OUTPATIENT
Start: 2021-08-03 | End: 2021-08-03

## 2021-08-03 RX ORDER — SODIUM CHLORIDE 9 MG/ML
INJECTION, SOLUTION INTRAVENOUS PRN
Status: DISCONTINUED | OUTPATIENT
Start: 2021-08-03 | End: 2021-08-04

## 2021-08-03 RX ORDER — ALBUTEROL SULFATE 90 UG/1
2 AEROSOL, METERED RESPIRATORY (INHALATION) EVERY 6 HOURS PRN
Status: DISCONTINUED | OUTPATIENT
Start: 2021-08-03 | End: 2021-08-09 | Stop reason: HOSPADM

## 2021-08-03 RX ORDER — SODIUM CHLORIDE 0.9 % (FLUSH) 0.9 %
10 SYRINGE (ML) INJECTION EVERY 12 HOURS SCHEDULED
Status: DISCONTINUED | OUTPATIENT
Start: 2021-08-03 | End: 2021-08-03 | Stop reason: HOSPADM

## 2021-08-03 RX ORDER — POTASSIUM CHLORIDE 29.8 MG/ML
20 INJECTION INTRAVENOUS PRN
Status: DISCONTINUED | OUTPATIENT
Start: 2021-08-03 | End: 2021-08-04

## 2021-08-03 RX ORDER — MULTIVITAMIN WITH IRON
1 TABLET ORAL
Status: DISCONTINUED | OUTPATIENT
Start: 2021-08-04 | End: 2021-08-09 | Stop reason: HOSPADM

## 2021-08-03 RX ORDER — ATORVASTATIN CALCIUM 20 MG/1
20 TABLET, FILM COATED ORAL NIGHTLY
Status: DISCONTINUED | OUTPATIENT
Start: 2021-08-04 | End: 2021-08-09 | Stop reason: HOSPADM

## 2021-08-03 RX ORDER — AMIODARONE HYDROCHLORIDE 200 MG/1
200 TABLET ORAL 2 TIMES DAILY
Status: DISCONTINUED | OUTPATIENT
Start: 2021-08-03 | End: 2021-08-04

## 2021-08-03 RX ORDER — ALBUMIN, HUMAN INJ 5% 5 %
25 SOLUTION INTRAVENOUS PRN
Status: DISCONTINUED | OUTPATIENT
Start: 2021-08-03 | End: 2021-08-04

## 2021-08-03 RX ORDER — SODIUM CHLORIDE 9 MG/ML
25 INJECTION, SOLUTION INTRAVENOUS PRN
Status: DISCONTINUED | OUTPATIENT
Start: 2021-08-03 | End: 2021-08-03 | Stop reason: HOSPADM

## 2021-08-03 RX ORDER — NITROGLYCERIN 20 MG/100ML
INJECTION INTRAVENOUS PRN
Status: DISCONTINUED | OUTPATIENT
Start: 2021-08-03 | End: 2021-08-03 | Stop reason: SDUPTHER

## 2021-08-03 RX ORDER — METOPROLOL TARTRATE 5 MG/5ML
2.5 INJECTION INTRAVENOUS EVERY 10 MIN PRN
Status: DISCONTINUED | OUTPATIENT
Start: 2021-08-03 | End: 2021-08-04

## 2021-08-03 RX ORDER — PROTAMINE SULFATE 10 MG/ML
INJECTION, SOLUTION INTRAVENOUS PRN
Status: DISCONTINUED | OUTPATIENT
Start: 2021-08-03 | End: 2021-08-03 | Stop reason: SDUPTHER

## 2021-08-03 RX ORDER — DEXTROSE MONOHYDRATE 50 MG/ML
100 INJECTION, SOLUTION INTRAVENOUS PRN
Status: DISCONTINUED | OUTPATIENT
Start: 2021-08-03 | End: 2021-08-04

## 2021-08-03 RX ORDER — SODIUM CHLORIDE 9 MG/ML
25 INJECTION, SOLUTION INTRAVENOUS PRN
Status: DISCONTINUED | OUTPATIENT
Start: 2021-08-03 | End: 2021-08-04

## 2021-08-03 RX ORDER — FENTANYL CITRATE 50 UG/ML
INJECTION, SOLUTION INTRAMUSCULAR; INTRAVENOUS PRN
Status: DISCONTINUED | OUTPATIENT
Start: 2021-08-03 | End: 2021-08-03 | Stop reason: SDUPTHER

## 2021-08-03 RX ORDER — OXYCODONE HYDROCHLORIDE 5 MG/1
10 TABLET ORAL EVERY 4 HOURS PRN
Status: DISCONTINUED | OUTPATIENT
Start: 2021-08-03 | End: 2021-08-09 | Stop reason: HOSPADM

## 2021-08-03 RX ORDER — SODIUM CHLORIDE 9 MG/ML
INJECTION, SOLUTION INTRAVENOUS CONTINUOUS
Status: DISCONTINUED | OUTPATIENT
Start: 2021-08-03 | End: 2021-08-03

## 2021-08-03 RX ORDER — ALBUMIN, HUMAN INJ 5% 5 %
SOLUTION INTRAVENOUS PRN
Status: DISCONTINUED | OUTPATIENT
Start: 2021-08-03 | End: 2021-08-03 | Stop reason: SDUPTHER

## 2021-08-03 RX ORDER — SENNOSIDES 8.8 MG/5ML
10 LIQUID ORAL DAILY PRN
Status: DISCONTINUED | OUTPATIENT
Start: 2021-08-03 | End: 2021-08-09 | Stop reason: HOSPADM

## 2021-08-03 RX ORDER — SODIUM CHLORIDE 0.9 % (FLUSH) 0.9 %
10 SYRINGE (ML) INJECTION PRN
Status: DISCONTINUED | OUTPATIENT
Start: 2021-08-03 | End: 2021-08-09 | Stop reason: HOSPADM

## 2021-08-03 RX ORDER — SODIUM CHLORIDE 0.9 % (FLUSH) 0.9 %
10 SYRINGE (ML) INJECTION PRN
Status: DISCONTINUED | OUTPATIENT
Start: 2021-08-03 | End: 2021-08-03 | Stop reason: HOSPADM

## 2021-08-03 RX ORDER — SODIUM CHLORIDE 0.9 % (FLUSH) 0.9 %
10 SYRINGE (ML) INJECTION EVERY 12 HOURS SCHEDULED
Status: DISCONTINUED | OUTPATIENT
Start: 2021-08-03 | End: 2021-08-09 | Stop reason: HOSPADM

## 2021-08-03 RX ORDER — AMIODARONE HYDROCHLORIDE 200 MG/1
200 TABLET ORAL ONCE
Status: COMPLETED | OUTPATIENT
Start: 2021-08-03 | End: 2021-08-03

## 2021-08-03 RX ADMIN — POTASSIUM CHLORIDE 20 MEQ: 29.8 INJECTION, SOLUTION INTRAVENOUS at 15:05

## 2021-08-03 RX ADMIN — ALBUMIN (HUMAN) 250 ML: 12.5 SOLUTION INTRAVENOUS at 12:16

## 2021-08-03 RX ADMIN — Medication 4 UNITS/HR: at 09:21

## 2021-08-03 RX ADMIN — CLEVIPIDINE 4 MG/HR: 0.5 EMULSION INTRAVENOUS at 20:29

## 2021-08-03 RX ADMIN — PHENYLEPHRINE HYDROCHLORIDE 100 MCG: 10 INJECTION INTRAVENOUS at 08:43

## 2021-08-03 RX ADMIN — SODIUM CHLORIDE: 9 INJECTION, SOLUTION INTRAVENOUS at 07:43

## 2021-08-03 RX ADMIN — PHENYLEPHRINE HYDROCHLORIDE 200 MCG: 10 INJECTION INTRAVENOUS at 09:42

## 2021-08-03 RX ADMIN — POTASSIUM CHLORIDE 20 MEQ: 29.8 INJECTION, SOLUTION INTRAVENOUS at 20:30

## 2021-08-03 RX ADMIN — AMIODARONE HYDROCHLORIDE 200 MG: 200 TABLET ORAL at 06:22

## 2021-08-03 RX ADMIN — EPINEPHRINE 2 MCG/MIN: 1 INJECTION INTRAMUSCULAR; INTRAVENOUS; SUBCUTANEOUS at 11:28

## 2021-08-03 RX ADMIN — MIDAZOLAM 5 MG: 1 INJECTION INTRAMUSCULAR; INTRAVENOUS at 10:55

## 2021-08-03 RX ADMIN — PHENYLEPHRINE HYDROCHLORIDE 100 MCG: 10 INJECTION INTRAVENOUS at 08:50

## 2021-08-03 RX ADMIN — CEFAZOLIN 2000 MG: 10 INJECTION, POWDER, FOR SOLUTION INTRAVENOUS at 16:14

## 2021-08-03 RX ADMIN — PHENYLEPHRINE HYDROCHLORIDE 100 MCG: 10 INJECTION INTRAVENOUS at 09:25

## 2021-08-03 RX ADMIN — HEPARIN SODIUM 5000 UNITS: 1000 INJECTION INTRAVENOUS; SUBCUTANEOUS at 08:35

## 2021-08-03 RX ADMIN — SODIUM CHLORIDE, SODIUM GLUCONATE, SODIUM ACETATE, POTASSIUM CHLORIDE AND MAGNESIUM CHLORIDE: 526; 502; 368; 37; 30 INJECTION, SOLUTION INTRAVENOUS at 08:00

## 2021-08-03 RX ADMIN — ALBUMIN (HUMAN) 25 G: 12.5 INJECTION, SOLUTION INTRAVENOUS at 14:53

## 2021-08-03 RX ADMIN — ROCURONIUM BROMIDE 20 MG: 10 INJECTION INTRAVENOUS at 10:55

## 2021-08-03 RX ADMIN — MIDAZOLAM 3 MG: 1 INJECTION INTRAMUSCULAR; INTRAVENOUS at 07:47

## 2021-08-03 RX ADMIN — CLEVIPIDINE 2 MG/HR: 0.5 EMULSION INTRAVENOUS at 13:24

## 2021-08-03 RX ADMIN — PHENYLEPHRINE HYDROCHLORIDE 100 MCG: 10 INJECTION INTRAVENOUS at 09:09

## 2021-08-03 RX ADMIN — FENTANYL CITRATE 150 MCG: 50 INJECTION, SOLUTION INTRAMUSCULAR; INTRAVENOUS at 08:45

## 2021-08-03 RX ADMIN — NITROGLYCERIN 40 MCG: 20 INJECTION INTRAVENOUS at 11:37

## 2021-08-03 RX ADMIN — ALBUMIN (HUMAN) 250 ML: 12.5 SOLUTION INTRAVENOUS at 11:54

## 2021-08-03 RX ADMIN — DEXTROSE MONOHYDRATE 12.5 G: 25 INJECTION, SOLUTION INTRAVENOUS at 13:09

## 2021-08-03 RX ADMIN — MIDAZOLAM 2 MG: 1 INJECTION INTRAMUSCULAR; INTRAVENOUS at 07:41

## 2021-08-03 RX ADMIN — NITROGLYCERIN 40 MCG: 20 INJECTION INTRAVENOUS at 11:35

## 2021-08-03 RX ADMIN — SODIUM CHLORIDE, PRESERVATIVE FREE 10 ML: 5 INJECTION INTRAVENOUS at 20:32

## 2021-08-03 RX ADMIN — CEFAZOLIN 2000 MG: 10 INJECTION, POWDER, FOR SOLUTION INTRAVENOUS at 23:18

## 2021-08-03 RX ADMIN — ROCURONIUM BROMIDE 30 MG: 10 INJECTION INTRAVENOUS at 09:50

## 2021-08-03 RX ADMIN — ROCURONIUM BROMIDE 50 MG: 10 INJECTION INTRAVENOUS at 08:25

## 2021-08-03 RX ADMIN — PROTAMINE SULFATE 190 MG: 10 INJECTION, SOLUTION INTRAVENOUS at 11:36

## 2021-08-03 RX ADMIN — PROPOFOL 50 MG: 10 INJECTION, EMULSION INTRAVENOUS at 07:47

## 2021-08-03 RX ADMIN — ONDANSETRON 4 MG: 2 INJECTION INTRAMUSCULAR; INTRAVENOUS at 23:44

## 2021-08-03 RX ADMIN — FENTANYL CITRATE 100 MCG: 50 INJECTION, SOLUTION INTRAMUSCULAR; INTRAVENOUS at 07:47

## 2021-08-03 RX ADMIN — NITROGLYCERIN 80 MCG: 20 INJECTION INTRAVENOUS at 11:46

## 2021-08-03 RX ADMIN — ROCURONIUM BROMIDE 50 MG: 10 INJECTION INTRAVENOUS at 07:47

## 2021-08-03 RX ADMIN — OXYCODONE 5 MG: 5 TABLET ORAL at 20:40

## 2021-08-03 RX ADMIN — ALBUMIN (HUMAN) 25 G: 12.5 INJECTION, SOLUTION INTRAVENOUS at 17:44

## 2021-08-03 RX ADMIN — FAMOTIDINE 20 MG: 10 INJECTION, SOLUTION INTRAVENOUS at 20:32

## 2021-08-03 RX ADMIN — FENTANYL CITRATE 100 MCG: 50 INJECTION, SOLUTION INTRAMUSCULAR; INTRAVENOUS at 08:39

## 2021-08-03 RX ADMIN — FAMOTIDINE 20 MG: 10 INJECTION, SOLUTION INTRAVENOUS at 16:32

## 2021-08-03 RX ADMIN — SODIUM CHLORIDE: 9 INJECTION, SOLUTION INTRAVENOUS at 06:55

## 2021-08-03 RX ADMIN — Medication 100 MG: at 07:47

## 2021-08-03 RX ADMIN — DESMOPRESSIN ACETATE 24 MCG: 4 INJECTION, SOLUTION INTRAVENOUS; SUBCUTANEOUS at 11:39

## 2021-08-03 RX ADMIN — AMINOCAPROIC ACID 5000 MG: 250 INJECTION, SOLUTION INTRAVENOUS at 08:33

## 2021-08-03 RX ADMIN — PHENYLEPHRINE HYDROCHLORIDE 100 MCG: 10 INJECTION INTRAVENOUS at 09:40

## 2021-08-03 RX ADMIN — PHENYLEPHRINE HYDROCHLORIDE 200 MCG: 10 INJECTION INTRAVENOUS at 09:13

## 2021-08-03 RX ADMIN — Medication 15 ML: at 06:23

## 2021-08-03 RX ADMIN — CEFAZOLIN 2000 MG: 10 INJECTION, POWDER, FOR SOLUTION INTRAVENOUS at 11:47

## 2021-08-03 RX ADMIN — FENTANYL CITRATE 150 MCG: 50 INJECTION, SOLUTION INTRAMUSCULAR; INTRAVENOUS at 07:48

## 2021-08-03 RX ADMIN — HEPARIN SODIUM 26000 UNITS: 1000 INJECTION INTRAVENOUS; SUBCUTANEOUS at 09:03

## 2021-08-03 RX ADMIN — FENTANYL CITRATE 100 MCG: 50 INJECTION, SOLUTION INTRAMUSCULAR; INTRAVENOUS at 11:41

## 2021-08-03 RX ADMIN — AMIODARONE HYDROCHLORIDE 200 MG: 200 TABLET ORAL at 20:32

## 2021-08-03 RX ADMIN — CALCIUM GLUCONATE 1500 MG: 98 INJECTION, SOLUTION INTRAVENOUS at 17:11

## 2021-08-03 RX ADMIN — BISACODYL 10 MG: 5 TABLET, COATED ORAL at 20:32

## 2021-08-03 RX ADMIN — CEFAZOLIN 2000 MG: 10 INJECTION, POWDER, FOR SOLUTION INTRAVENOUS at 08:31

## 2021-08-03 RX ADMIN — ALBUMIN (HUMAN) 25 G: 12.5 INJECTION, SOLUTION INTRAVENOUS at 23:45

## 2021-08-03 RX ADMIN — AMINOCAPROIC ACID 5000 MG: 250 INJECTION, SOLUTION INTRAVENOUS at 11:42

## 2021-08-03 RX ADMIN — MORPHINE SULFATE 2 MG: 2 INJECTION, SOLUTION INTRAMUSCULAR; INTRAVENOUS at 17:49

## 2021-08-03 RX ADMIN — AMINOCAPROIC ACID 1 G/HR: 250 INJECTION, SOLUTION INTRAVENOUS at 12:15

## 2021-08-03 ASSESSMENT — PULMONARY FUNCTION TESTS
PIF_VALUE: 12
PIF_VALUE: 1
PIF_VALUE: 17
PIF_VALUE: 15
PIF_VALUE: 12
PIF_VALUE: 12
PIF_VALUE: 14
PIF_VALUE: 19
PIF_VALUE: 0
PIF_VALUE: 15
PIF_VALUE: 15
PIF_VALUE: 13
PIF_VALUE: 13
PIF_VALUE: 0
PIF_VALUE: 0
PIF_VALUE: 16
PIF_VALUE: 0
PIF_VALUE: 15
PIF_VALUE: 15
PIF_VALUE: 19
PIF_VALUE: 16
PIF_VALUE: 0
PIF_VALUE: 15
PIF_VALUE: 12
PIF_VALUE: 0
PIF_VALUE: 15
PIF_VALUE: 0
PIF_VALUE: 13
PIF_VALUE: 20
PIF_VALUE: 15
PIF_VALUE: 0
PIF_VALUE: 15
PIF_VALUE: 14
PIF_VALUE: 0
PIF_VALUE: 14
PIF_VALUE: 25
PIF_VALUE: 15
PIF_VALUE: 0
PIF_VALUE: 12
PIF_VALUE: 15
PIF_VALUE: 12
PIF_VALUE: 12
PIF_VALUE: 0
PIF_VALUE: 0
PIF_VALUE: 15
PIF_VALUE: 0
PIF_VALUE: 12
PIF_VALUE: 19
PIF_VALUE: 19
PIF_VALUE: 18
PIF_VALUE: 13
PIF_VALUE: 19
PIF_VALUE: 0
PIF_VALUE: 15
PIF_VALUE: 16
PIF_VALUE: 0
PIF_VALUE: 0
PIF_VALUE: 13
PIF_VALUE: 0
PIF_VALUE: 15
PIF_VALUE: 0
PIF_VALUE: 0
PIF_VALUE: 15
PIF_VALUE: 14
PIF_VALUE: 13
PIF_VALUE: 14
PIF_VALUE: 0
PIF_VALUE: 20
PIF_VALUE: 15
PIF_VALUE: 0
PIF_VALUE: 0
PIF_VALUE: 12
PIF_VALUE: 0
PIF_VALUE: 0
PIF_VALUE: 12
PIF_VALUE: 13
PIF_VALUE: 31
PIF_VALUE: 13
PIF_VALUE: 0
PIF_VALUE: 14
PIF_VALUE: 1
PIF_VALUE: 15
PIF_VALUE: 13
PIF_VALUE: 12
PIF_VALUE: 19
PIF_VALUE: 15
PIF_VALUE: 12
PIF_VALUE: 18
PIF_VALUE: 0
PIF_VALUE: 15
PIF_VALUE: 15
PIF_VALUE: 0
PIF_VALUE: 0
PIF_VALUE: 14
PIF_VALUE: 19
PIF_VALUE: 0
PIF_VALUE: 15
PIF_VALUE: 0
PIF_VALUE: 13
PIF_VALUE: 0
PIF_VALUE: 13
PIF_VALUE: 13
PIF_VALUE: 15
PIF_VALUE: 0
PIF_VALUE: 14
PIF_VALUE: 12
PIF_VALUE: 17
PIF_VALUE: 13
PIF_VALUE: 13
PIF_VALUE: 0
PIF_VALUE: 0
PIF_VALUE: 12
PIF_VALUE: 19
PIF_VALUE: 12
PIF_VALUE: 14
PIF_VALUE: 12
PIF_VALUE: 15
PIF_VALUE: 0
PIF_VALUE: 15
PIF_VALUE: 0
PIF_VALUE: 20
PIF_VALUE: 15
PIF_VALUE: 0
PIF_VALUE: 12
PIF_VALUE: 15
PIF_VALUE: 15
PIF_VALUE: 9
PIF_VALUE: 13
PIF_VALUE: 15
PIF_VALUE: 14
PIF_VALUE: 0
PIF_VALUE: 17
PIF_VALUE: 13
PIF_VALUE: 0
PIF_VALUE: 15
PIF_VALUE: 0
PIF_VALUE: 13
PIF_VALUE: 13
PIF_VALUE: 0
PIF_VALUE: 0
PIF_VALUE: 15
PIF_VALUE: 0
PIF_VALUE: 14
PIF_VALUE: 0
PIF_VALUE: 12
PIF_VALUE: 0
PIF_VALUE: 14
PIF_VALUE: 19
PIF_VALUE: 16
PIF_VALUE: 0
PIF_VALUE: 15
PIF_VALUE: 12
PIF_VALUE: 12
PIF_VALUE: 13
PIF_VALUE: 20
PIF_VALUE: 19
PIF_VALUE: 12
PIF_VALUE: 0
PIF_VALUE: 0
PIF_VALUE: 16
PIF_VALUE: 1
PIF_VALUE: 14
PIF_VALUE: 0
PIF_VALUE: 1
PIF_VALUE: 12
PIF_VALUE: 13
PIF_VALUE: 0
PIF_VALUE: 14
PIF_VALUE: 0
PIF_VALUE: 24
PIF_VALUE: 14
PIF_VALUE: 14
PIF_VALUE: 18
PIF_VALUE: 19
PIF_VALUE: 0
PIF_VALUE: 12
PIF_VALUE: 19
PIF_VALUE: 14
PIF_VALUE: 14
PIF_VALUE: 0
PIF_VALUE: 0
PIF_VALUE: 15
PIF_VALUE: 0
PIF_VALUE: 19
PIF_VALUE: 0
PIF_VALUE: 19
PIF_VALUE: 20
PIF_VALUE: 23
PIF_VALUE: 0
PIF_VALUE: 15
PIF_VALUE: 0
PIF_VALUE: 13
PIF_VALUE: 15
PIF_VALUE: 15
PIF_VALUE: 19
PIF_VALUE: 15
PIF_VALUE: 18
PIF_VALUE: 13
PIF_VALUE: 0
PIF_VALUE: 12
PIF_VALUE: 15
PIF_VALUE: 0
PIF_VALUE: 15
PIF_VALUE: 19
PIF_VALUE: 0
PIF_VALUE: 15
PIF_VALUE: 12
PIF_VALUE: 12
PIF_VALUE: 0
PIF_VALUE: 12
PIF_VALUE: 0
PIF_VALUE: 13
PIF_VALUE: 12
PIF_VALUE: 0
PIF_VALUE: 18
PIF_VALUE: 15
PIF_VALUE: 12
PIF_VALUE: 0
PIF_VALUE: 12
PIF_VALUE: 0
PIF_VALUE: 0
PIF_VALUE: 12
PIF_VALUE: 12
PIF_VALUE: 16
PIF_VALUE: 0
PIF_VALUE: 12
PIF_VALUE: 0
PIF_VALUE: 15
PIF_VALUE: 0
PIF_VALUE: 15
PIF_VALUE: 14
PIF_VALUE: 0
PIF_VALUE: 14
PIF_VALUE: 17
PIF_VALUE: 25
PIF_VALUE: 14
PIF_VALUE: 0
PIF_VALUE: 15
PIF_VALUE: 14
PIF_VALUE: 14
PIF_VALUE: 0
PIF_VALUE: 6
PIF_VALUE: 14
PIF_VALUE: 12
PIF_VALUE: 0
PIF_VALUE: 12
PIF_VALUE: 12
PIF_VALUE: 0
PIF_VALUE: 19
PIF_VALUE: 0
PIF_VALUE: 18
PIF_VALUE: 0
PIF_VALUE: 15
PIF_VALUE: 14
PIF_VALUE: 0
PIF_VALUE: 13
PIF_VALUE: 16
PIF_VALUE: 13
PIF_VALUE: 13
PIF_VALUE: 14
PIF_VALUE: 0
PIF_VALUE: 20
PIF_VALUE: 14
PIF_VALUE: 29
PIF_VALUE: 0
PIF_VALUE: 0
PIF_VALUE: 19
PIF_VALUE: 0
PIF_VALUE: 11
PIF_VALUE: 0
PIF_VALUE: 0
PIF_VALUE: 12
PIF_VALUE: 13
PIF_VALUE: 15
PIF_VALUE: 0
PIF_VALUE: 15
PIF_VALUE: 13
PIF_VALUE: 15
PIF_VALUE: 0
PIF_VALUE: 0
PIF_VALUE: 17
PIF_VALUE: 18
PIF_VALUE: 14
PIF_VALUE: 14
PIF_VALUE: 12
PIF_VALUE: 17
PIF_VALUE: 0
PIF_VALUE: 0
PIF_VALUE: 13
PIF_VALUE: 14
PIF_VALUE: 17
PIF_VALUE: 12
PIF_VALUE: 19
PIF_VALUE: 12
PIF_VALUE: 15
PIF_VALUE: 14
PIF_VALUE: 15
PIF_VALUE: 15
PIF_VALUE: 0
PIF_VALUE: 2

## 2021-08-03 ASSESSMENT — PAIN SCALES - GENERAL
PAINLEVEL_OUTOF10: 4
PAINLEVEL_OUTOF10: 7
PAINLEVEL_OUTOF10: 4
PAINLEVEL_OUTOF10: 7

## 2021-08-03 ASSESSMENT — PAIN DESCRIPTION - ORIENTATION: ORIENTATION: LOWER

## 2021-08-03 ASSESSMENT — LIFESTYLE VARIABLES: SMOKING_STATUS: 1

## 2021-08-03 ASSESSMENT — PAIN DESCRIPTION - LOCATION: LOCATION: BACK

## 2021-08-03 ASSESSMENT — PAIN DESCRIPTION - PAIN TYPE: TYPE: CHRONIC PAIN

## 2021-08-03 NOTE — PROGRESS NOTES
[facility] RESPIRATORY ASSESSMENT PROGRAM (RAP)  30 kg and over    Patient Name: Sloane Doctor Room#: 4D-11/011-A : 1970     Admitting diagnosis: CAD, multiple vessel [I25.10]        PATIENT HISTORY AND PHYSICAL ASSESSMENT     Surgical History   [] None  []General []Lower abdominal [x]Thoracic or upper  []Thoracic or upper with pulmonary disease  Chest X-ray    [x]Clear or none available   []Chronic radiological changes   []Infiltrates or atelectasis in one lobe   []Infiltrates or atelectasis in one or more lobe or rib fractures   []Infiltrates or atelectasis in one or more lobe AND rib fractures, two or more  Pulmonary History   [] Non- smoker no previous history   []Smoking hx quit > 6 months   [x]Current smoking history   []Previous pulmonary disease or acute pulmonary process    []Severe dyspnea or exacerbated chronic lung disease  Current Respiratory status   [] Regular pattern: RR 12-20 bpm   [] Tachypnea RR 21-25 bpm   [] Dyspnea at rest. Irregular pattern.  RR 26-30   [] Use of accessory muscles, prolonged exhalation or RR > 31-35 bpm   [] Severe dyspnea; use of accessory muscles or RR > 35 bpm  Cough   []Strong non-productive   [x]Strong productive   []Weak, non-productive    []Weak, productive    []No spontaneous cough/may require suctioning  Sputum Color   [x]Clear/white   []Yellow   []Green   []Brown/tan/bloody   []None observed  Sputum Amount   []None   []Scant < 1 ml   [x]Small 1-3 ml   []Moderate 4-10 ml   []Large > 10 ml  Sputum Consistency   []Thin   [x]Thick   []Tenacious   []Unknown   []Other   Mental Status   [x]Alert, oriented and cooperative   []Disoriented sedated but follow commands   []Uncooperative or combative, does not follow commands   []Obtunded   []Comatose  Level of Activity   []Ambulatory   []Ambulates with assistance   [x]Temporarily non-ambulatory   []Paraplegic or bed rest, able to position self   []Quadriplegic or bed rest, unable to position self  Pulmonary Function Test   [x]None available   []Normal   []Obstructive  []Restrictive   []Diffusion defect        LAB  Hematology:   Lab Results   Component Value Date    WBC 4.0 2021    RBC 2.35 2021    HGB 7.8 2021    HGB 6.8 2021    HCT 22.9 2021    PLT 92 2021     Chemistry:   Lab Results   Component Value Date    PH 7.41 2021    PO2 96 2021    PCO2 37 2021    HCO3 23 2021    O2SAT 98 2021     Blood Culture:   Sputum Culture:     Home pulmonary medications: none  Home Bipap or CPAP No  Pulmonary Diagnosis none    [facility] RESPIRATORY ASSESSMENT PROGRAM (RAP)  30 kg and over      Patient Name: Obi Palacios Room#: 4D-11/011-A : 1970     Admitting diagnosis:      ASSESSMENT     Vitals  Pulse: 80   Resp: 18  BP: 114/66  SpO2: 98 %  Temp: 97.7 °F (36.5 °C)  Breath Sounds:clear  Comment: Patient placed on CPAP 10 for volume expansion post extubation due to inability to do IS at this time. PEF   Predicted:   Personal Best:      Inspiratory Capacity:   Preoperative/predictive value: 5000 ml   33% of value: 1650 ml      75% of value: 3750 ml   10 ml/kg of IBW: 730 ml   Patient's Actual Inspiratory Capacity: 0 ml    CARE PLAN  All Care Plan selections must be based on the approved algorithms located on line in the Policy and Procedures under Respiratory Assessment Adult Protocol Handbook    INDICATIONS FOR THERAPY BASED ON HISTORY AND ASSESSMENT    Bronchial Hygiene Goal: Improvement in sputum mobilization in patients with ineffective airway clearance. Reverse the atelectasis. No indications  Volume Expansion Goal: Prevent atelectasis, Absence or improvement in signs of atelectasis, improve respiratory muscle performance   Preoperative screening of patients at risk for post-operative complications to obtain baseline flow or volume.   Inhaled Medications Goal: Improve respiratory functions in patients with airway disease and decrease WOB  Albuterol Indications   No indications. Ipratropium Bromide Indications   No indications  Oxygenation Goal: Reverse hypoxemia and improve tissue oxygenation. (See oxygen protocol order)   SpO2 < 90% (Check order for SpO2 goal). THERAPIES SELECTED BASED ON ALGORITHMS    Bronchial Hygiene   No therapy recommended  Volume expansion   CPAP  Inhaled Medication   No therapy recommended   Oxygenation   Nasal cannula    BRONCHODILATOR ASSESSMENT SCORE        ASSESSMENT OUTCOMES     Bronchial Hygiene    Not assessed. Volume Expansion   Not assessed. Inhaled Medication   Not assessed. Oxygenation   SpO2 greater than or equal to physician's ordered SpO2 goal.    Action Plan Based on Assessment:    Not enough information available, reassess in 24 hours.     Comments:

## 2021-08-03 NOTE — PROGRESS NOTES
[id number] dr. Love Sandoval at bedside , reziewed chest x ray , hemodynamics, aware of chest tube output, orders for  A teg.  1738 extubated and placed on nasal cannula 3, patient follows commands

## 2021-08-03 NOTE — ANESTHESIA PROCEDURE NOTES
Arterial Line:    An arterial line was placed using surface landmarks, in the OR for the following indication(s): continuous blood pressure monitoring. A 20 gauge (size), (length), Arrow (type) catheter was placed, into the left radial artery, secured by suture. Anesthesia type: General    Events:  patient tolerated procedure well with no complications. 8/3/2021 7:50 AM8/3/2021 8:00 AM  Anesthesiologist: Monika Méndez MD  Resident/CRNA: Adriane Menendez RN  Other anesthesia staff:  RACHEL Tomas - CRNA  Performed: Resident/CRNA   Preanesthetic Checklist  Completed: patient identified, IV checked, site marked, risks and benefits discussed, surgical consent, monitors and equipment checked, pre-op evaluation, timeout performed, anesthesia consent given, oxygen available and patient being monitored

## 2021-08-03 NOTE — PROCEDURES
EKG completed and handed to [employer].  Narciso Arguello CET and Salvador Ramirez CCT

## 2021-08-03 NOTE — PROGRESS NOTES
ADMITTED TO hospitals AND ORIENTED TO UNIT. SCDS ON. FALL BAND ON. PT VERBALIZED APPROVAL FOR FIRST NAME, LAST INITIAL AND PHYSICIAN NAME ON UNIT WHITEBOARD. There is no one with the patient today. Emergency contact information is in Epic per patient statement.

## 2021-08-03 NOTE — H&P
Cardiovascular Surgery History and Physical       HPI:   46year old male non-diabetic presents with worsening fatigue and syncopal episodes without chest pain. Murmur detected on physical exam prompted TTE & MARY, essentially showing low normal LVEF and mild AS (peak gradient 22). LHC shows severe multivessel CAD. Patient incidentally reports onset of left-sided weakness after COVID vaccination in February, which has improved. Previous heavy smoker, in process of quitting.              Current Medication      Current Outpatient Medications:     omeprazole (PRILOSEC) 20 MG delayed release capsule, Take 1 capsule by mouth every morning (before breakfast), Disp: 90 capsule, Rfl: 1    carvedilol (COREG) 12.5 MG tablet, Take 1 tablet by mouth 2 times daily, Disp: 60 tablet, Rfl: 3    simvastatin (ZOCOR) 10 MG tablet, Take 1 tablet by mouth nightly, Disp: 30 tablet, Rfl: 2    aspirin (EQ ASPIRIN) 325 MG tablet, TAKE 1 TABLET BY MOUTH ONCE DAILY, Disp: 90 tablet, Rfl: 3    Handicap Placard MISC, by Does not apply route Expires in 5 years, Disp: 2 each, Rfl: 0    [employer] PURELAX 17 GM/SCOOP powder, TAKE 17 GRAMS AS DIRECTED DAILY AS NEEDED FOR CONSTIPATION FOR 3 DAYS. THEN AS NEEDED., Disp: , Rfl:     NIFEdipine (PROCARDIA XL) 90 MG extended release tablet, Take 1 tablet by mouth daily, Disp: 90 tablet, Rfl: 3    varenicline (CHANTIX) 0.5 MG tablet, Take 1-2 tablets by mouth See Admin Instructions 0.5mg DAILY for 3 days followed by 0.5mg TWICE DAILY for 4 days followed by 1mg TWICE DAILY, Disp: 57 tablet, Rfl: 0    lisinopril (PRINIVIL;ZESTRIL) 40 MG tablet, Take 1 tablet by mouth daily, Disp: 90 tablet, Rfl: 3        No Known Allergies     Past Medical History  Vasyl Guevara  has a past medical history of Essential hypertension and Stroke ([facility] [address]    Social History  Vasyl Guevara  reports that he has been smoking cigarettes. He started smoking about 31 years ago. He has been smoking about 0.25 packs per day.  He has never used smokeless tobacco. He reports current alcohol use. He reports that he does not use drugs.     Family History  Chavez Dennison family history includes Breast Cancer in his mother; Other in his father.     There is no family history of bicuspid aortic valve, aneurysms, heart transplant, pacemakers, defibrillators, or sudden cardiac death.        Past Surgical History   Past Surgical History   History reviewed. No pertinent surgical history.        Subjective:      Review of Systems   Constitutional: Positive for activity change and fatigue. HENT: Negative for congestion. Eyes: Negative for discharge. Respiratory: Positive for chest tightness and shortness of breath. Cardiovascular: Negative for chest pain. Gastrointestinal: Negative for abdominal pain. Endocrine: Negative for cold intolerance. Genitourinary: Negative for difficulty urinating. Musculoskeletal: Negative for arthralgias. Skin: Negative for color change. Neurological: Positive for dizziness and weakness. Hematological: Negative for adenopathy. Psychiatric/Behavioral: Negative for agitation.         Objective:      /69 (Site: Left Upper Arm, Position: Sitting, Cuff Size: Medium Adult)   Pulse 80   Ht 5' 10\" (1.778 m)   Wt 162 lb (73.5 kg)   BMI 23.24 kg/m²          Wt Readings from Last 3 Encounters:   07/14/21 162 lb (73.5 kg)   07/07/21 158 lb (71.7 kg)   06/30/21 158 lb (71.7 kg)          BP Readings from Last 3 Encounters:   07/14/21 137/69   07/07/21 (!) 149/102   06/30/21 122/72         Physical Exam  Vitals reviewed. Constitutional:       Appearance: Normal appearance. He is normal weight. HENT:      Head: Normocephalic and atraumatic. Right Ear: Tympanic membrane, ear canal and external ear normal.      Left Ear: Tympanic membrane, ear canal and external ear normal.      Nose: Nose normal.      Mouth/Throat:      Mouth: Mucous membranes are moist.      Pharynx: Oropharynx is clear.    Eyes:        Testing Reviewed:       I have individually reviewed the images of the following tests:     CT chest: pending     Echocardiogram:No results found for this or any previous visit. as per HPI     Left heart catheterization: as per HPI     Assessment/Plan      1. CAD, multiple vessel    2. Pre-op examination       No orders of the defined types were placed in this encounter.     46year old male with multivessel critical CAD. Plan CABG x 3, scheduled for 8/3. The risks, benefits and alternatives were discussed in detail with the patient and family. The risks include bleeding, infection, graft failure, cardiac arrhythmias, thromboembolism, stroke, need for reoperation and death. The patient expressed understanding of these issues, confirmed that all questions were answered, and desires to proceed.

## 2021-08-03 NOTE — PROGRESS NOTES
Patient has been successfully weaned from Mechanical Ventilation. RSBI before extubation was 64 with EtCO2 of 35 and SpO2 of 100 on 40% FiO2. Patient extubated and placed on 3 liters/min via nasal cannula. Post extubation SpO2 is 100% with HR  80 bpm and RR 18 breaths/min. Patient had mild cough that was productive of clear  sputum. Extubation Well tolerated by patient. Karma Real

## 2021-08-03 NOTE — TELEPHONE ENCOUNTER
Pt was not sure if he passed out at the exact time of this strip. Do you want to continue to monitor at [facility] time until we get the full Event Monitor?

## 2021-08-03 NOTE — PROGRESS NOTES
Patient arrived to unit from OR via [facility]. Patient transferred to ICU bed and placed on continuous ICU bedside monitor. Patient admitted for CAD, multiple vessel [I25.10]. Vitals obtained. See flowsheets. Patient's IV access includes introducer. Current infusions and rates of infusion include epi mcg, amicar 50ml, insulin. Assessment completed by Sara Topete. Two nurse skin assessment completed by Castillo Quesada. See flowsheets for assessment details. Policies and procedures of ICU able to be explained to patient at this time. Family member(s)/representative(s) present at time of admission include son. Patient rights explained to family member(s)/representatives and patient, as able. Patient/patient's family member(s)/representative(s) Declined to have physician notified of their admission. All questions posed by patient's family member(s)/representative(s) and patient answered at this time. Size Of Lesion After Curettage: 1.2 Anesthesia Volume In Cc: 1.5 Number Of Curettages: 3 Accession #: [id number] Additional Information: (Optional): The wound was cleaned, and a pressure dressing was applied.  The patient received detailed post-op instructions.\\nAft the procedure, the patient was observed for 5-10 minutes and was oriented to person, place, and time.  Denied feeling dizzy, queasy, and stated that they did not feel that they were going to faint. Bill [id number] For Specimen Handling/Conveyance To Laboratory?: no Anesthesia Type: 1% lidocaine with epinephrine Cautery Type: hot cautery Histology Text: Following the procedure a portion of the curetted material was sent for histologic evaluation. Consent was obtained from the patient. The risks, benefits and alternatives to therapy were discussed in detail. Specifically, the risks of infection, scarring, bleeding, prolonged wound healing, nerve injury, incomplete removal, allergy to anesthesia and recurrence were addressed. Alternatives to ED&C, such as: surgical removal and XRT were also discussed.  Prior to the procedure, the treatment site was clearly identified and confirmed by the patient. All components of Universal Protocol/PAUSE Rule completed. Detail Level: Detailed What Was Performed First?: Curettage Post-Care Instructions: I reviewed with the patient in detail post-care instructions. Patient is to keep the area dry for 48 hours, and not to engage in any swimming until the area is healed. Should the patient develop any fevers, chills, bleeding, severe pain patient will contact the office immediately. Bill For Surgical Tray: yes Bill As A Line Item Or As Units: Line Item Size Of Lesion In Cm: 1 Billing Type: Third-Party Bill Biopsy Type: H and E

## 2021-08-03 NOTE — OP NOTE
DATE: 08/03/21    PATIENT: Larry Moore    MRN: [id number]     Acct: [de-identified]    PREOP DIAGNOSIS:   Coronary artery disease    Postop diagnosis:  Coronary artery disease    Procedure:  1) Coronary artery bypass grafting x 3, with the left internal mammary artery grafted to the left anterior descending artery, a reversed greater saphenous aortocoronary vein graft to the first diagonal artery, and a reversed greater saphenous aortocoronary vein graft to the posterior descending artery  2) Endoscopic greater saphenous vein harvest  3) Intraoperative coronary angiography of all distal anastomoses  4) Epi-aortic ultrasound of the ascending aorta    SURGEON:  Trini Milner MD    FIRST ASSISTANT OF MEDICAL NECESSITY: ANURADHA Nolan    DUE TO THE COMPLEXITY OF THE CASE, THE FIRST ASSISTANT WAS REQUIRED THROUGHOUT THE OPERATION FOR ENDOSCOPIC VEIN HARVEST AND FOR THE CRITICAL PORTIONS OF THE PROCEDURE    SECOND ASSISTANT: ANURADHA Leonard    INDICATION:  The patient is a 46y.o. year-old male who presents with crescendo angina in the context of triple vessel coronary disease. FINDNGS: There were no intrapericardial adhesions. The mammary artery and saphenous vein were of good caliber. The coronary targets were approximately 2 mm in diameter. CARDIOPULMONARY BYPASS TIME: 94    CROSSCLAMP TIME: 82    ESTIMATED BLOOD LOSS: Minimal (Cardiopulmonary bypass/cell saver)    DESCRIPTION:  The patient was prepped and draped in sterile fashion in the supine position. A formal time-out was performed. Heparin was administered. Working in two teams, the chest was opened, while a segment of greater saphenous vein was harvested using endoscopic techniques from the right thigh. The left internal mammary artery was mobilized from the chest wall and divided at its distal bifurcation. A retractor was placed, and a pericardial well was created. The ascending aorta was palpated and found to be free of calcifications.  Epiaortic ultrasound was performed on the ascending aorta; no intraluminal plaque was identified. The proximal arch was cannulated with a 21 mm arterial cannula. A double stage venous cannula was inserted into the right atrium. A retrograde cardioplegia cannula was inserted via right atrial pursestring into the coronary sinus. An antegrade cardioplegia cannula was placed in the mid distal ascending aorta. Cardiopulmonary bypass was initiated. The coronary targets were inspected. The aorta was crossclamped. 1000 ml of cold blood cardioplegia was administered antegrade, followed by 600 ml retrograde. Thereafter, at 20 minute intervals throughout the crossclamped portion of the operation, cold blood cardioplegia was administered down the completed vein grafts, the coronary sinus, and/or the aortic root. An apical suction device was applied, and the inferior wall was exposed. An arteriotomy was made in the mid proximal posterior descending coronary artery, and a segment of reversed greater saphenous vein was anastomosed in end to side fashion using running 7-0 Prolene. Patency was confirmed by direct administration of cardioplegia down the graft at a rate of 70 ml/min, followed by the direct injection of fluorescein contrast into the vein graft under real-time coronary angiography, showing excellent flow into the native coronary. The lateral wall was exposed. An arteriotomy was made in the proximal first diagonal coronary artery, and a separate segment of reversed greater saphenous vein was anastomosed in end to side fashion using running 7-0 Prolene. Patency was confirmed by direct administration of cardioplegia down the graft at a rate of 70 ml/min, followed by the direct injection of fluorescein contrast into the vein graft under real-time coronary angiography, showing excellent flow into the native coronary. The anterolateral wall was exposed.   The appropriately trimmed left internal mammary artery was anastomosed in end to side fashion to the mid proximal left anterior descending coronary artery using running 7-0 Prolene. Patency was confirmed with the injection into the cardiopulmonary bypass circuit of fluorescein contrast under real-time coronary angiography, showing excellent flow into the native coronary. The adventitia was trimmed off the mid ascending aorta. Two 4.4 mm punch aortotomies were made. The appropriately trimmed proximal ends of the vein grafts were anastomosed to the ascending aorta using running 6-0 Prolene. The patient was then placed in steep Trendelenburg position with the aortic root vent turned on full and retrograde cardioplegia infusing, so as to de-air the left heart. The crossclamp was removed, and the vein grafts were de-aired. Atrial and ventricular pacing wires were placed. The patient weaned readily from cardiopulmonary bypass with excellent contractility. Protamine was administered. The patient was de-cannulated. All sites were verified as hemostatic. Chest tubes were placed in the mediastinum and the pleural cavities. The chest was closed using #7 wire for the sternum, followed by the usual 3-layer soft tissue closure. The patient transferred to the ICU in stable condition.

## 2021-08-03 NOTE — ANESTHESIA PRE PROCEDURE
Department of Anesthesiology  Preprocedure Note       Name:  Skip Crockett   Age:  46 y.o.  :  1970                                          MRN:  [id number]         Date:  8/3/2021      Surgeon: Gisela Barrientos):  Copeland Lines, MD    Procedure: Procedure(s):  CABG X 3, MARY    Medications prior to admission:   Prior to Admission medications    Medication Sig Start Date End Date Taking? Authorizing Provider   omeprazole (PRILOSEC) 20 MG delayed release capsule Take 1 capsule by mouth every morning (before breakfast) 21  Yes RACHEL Eugene CNP   aspirin (EQ ASPIRIN) 325 MG tablet TAKE 1 TABLET BY MOUTH ONCE DAILY 21  Yes RACHEL Haines CNP   [employer] PURELAX 17 GM/SCOOP powder TAKE 17 GRAMS AS DIRECTED DAILY AS NEEDED FOR CONSTIPATION FOR 3 DAYS.  THEN AS NEEDED. 3/29/21  Yes Historical Provider, MD   NIFEdipine (PROCARDIA XL) 90 MG extended release tablet Take 1 tablet by mouth daily 21  Yes RACHEL Eugene CNP   lisinopril (PRINIVIL;ZESTRIL) 40 MG tablet Take 1 tablet by mouth daily 4/8/21 8/3/21 Yes RACHEL Eugene CNP   varenicline (CHANTIX) 1 MG tablet Take 1 tablet by mouth 2 times daily 7/15/21   RACHEL Eugene CNP       Current medications:    Current Facility-Administered Medications   Medication Dose Route Frequency Provider Last Rate Last Admin    0.9 % sodium chloride infusion  25 mL Intravenous PRN Flaquita Padron PA-C        ceFAZolin (ANCEF) 2000 mg in dextrose 5 % 50 mL IVPB  2,000 mg Intravenous On Call to [address] [place], [place] [address]HESHAM        chlorhexidine (HIBICLENS) 4 % liquid   Topical Once Flaquita Padron PA-C        insulin (HumuLIN R) 100 units in sodium chloride 0.9% 100 mL infusion  1 Units/hr Intravenous Continuous Flaquita Padron PA-C        sodium chloride flush 0.9 % injection 10 mL  10 mL Intravenous 2 times per day Flaquita Padron PA-C        sodium chloride flush 0.9 % injection 10 mL  10 mL Intravenous PRN Flaquita Padron PA-C        0.9 % sodium chloride infusion   Intravenous Continuous Nel Hamilton  mL/hr at 08/03/21 0655 New Bag at 08/03/21 0655       Allergies:  No Known Allergies    Problem List:    Patient Active Problem List   Diagnosis Code    Ac cerebral infarction w/ ischemia (HCC) I63.89    Hypertension I10    Tobacco abuse Z72.0    Weakness of left lower extremity R29.898    Stroke [facility]) I63.9    Multiple vessel coronary artery disease I25.10       Past Medical History:        Diagnosis Date    Acid reflux     Arthritis     back    CAD (coronary artery disease)     Chronic back pain     Essential hypertension     Stroke ([facility] [address] 03/2021    left side       Past Surgical History:        Procedure Laterality Date    BACK INJECTION  06/26/2021    CARDIAC CATHETERIZATION  07/07/2021       Social History:    Social History     Tobacco Use    Smoking status: Current Every Day Smoker     Packs/day: 0.25     Types: Cigarettes     Start date: 7/7/1990    Smokeless tobacco: Never Used   Substance Use Topics    Alcohol use: Yes     Comment: 8 beers a day                                 Ready to quit: Not Answered  Counseling given: Not Answered      Vital Signs (Current):   Vitals:    08/03/21 0630   BP: 114/66   Pulse: 89   Resp: 18   Temp: 98.4 °F (36.9 °C)   TempSrc: Temporal   SpO2: 100%   Weight: 160 lb 3.2 oz (72.7 kg)   Height: 5' 10\" (1.778 m)                                              BP Readings from Last 3 Encounters:   08/03/21 114/66   07/23/21 127/64   07/15/21 136/68       NPO Status: Time of last liquid consumption: 2000                        Time of last solid consumption: 2000                        Date of last liquid consumption: 08/02/21                        Date of last solid food consumption: 08/02/21    BMI:   Wt Readings from Last 3 Encounters:   08/03/21 160 lb 3.2 oz (72.7 kg)   07/23/21 158 lb 15.2 oz (72.1 kg)   07/15/21 162 lb (73.5 kg)     Body mass index is 22.99 kg/m².     CBC:   Lab Results   Component Value Date    WBC 4.5 07/23/2021    RBC 3.16 07/23/2021    HGB 10.8 07/23/2021    HCT 30.3 07/23/2021    MCV 95.9 07/23/2021    RDW 12.2 04/17/2021     07/23/2021       CMP:   Lab Results   Component Value Date     07/23/2021    K 5.1 07/23/2021    K 5.2 07/07/2021    CL 83 07/23/2021    CO2 25 07/23/2021    BUN 7 07/23/2021    CREATININE 0.9 07/23/2021    LABGLOM 88 07/23/2021    GLUCOSE 130 07/23/2021    CALCIUM 10.5 07/23/2021    BILITOT 0.8 04/17/2021    ALKPHOS 68 04/17/2021    AST 45 04/17/2021    ALT 44 04/17/2021       POC Tests: No results for input(s): POCGLU, POCNA, POCK, POCCL, POCBUN, POCHEMO, POCHCT in the last 72 hours. Coags:   Lab Results   Component Value Date    INR 0.93 07/23/2021    APTT 28.0 07/07/2021       HCG (If Applicable): No results found for: PREGTESTUR, PREGSERUM, HCG, HCGQUANT     ABGs: No results found for: PHART, PO2ART, WRJ4GJF, QFV9LWW, BEART, V2IFRNVX     Type & Screen (If Applicable):  Lab Results   Component Value Date    LABRH POS 07/23/2021       Drug/Infectious Status (If Applicable):  Lab Results   Component Value Date    HEPCAB Negative 03/25/2021       COVID-19 Screening (If Applicable): No results found for: COVID19        Anesthesia Evaluation    Airway: Mallampati: II  TM distance: >3 FB   Neck ROM: full  Mouth opening: > = 3 FB Dental:          Pulmonary:   (+) decreased breath sounds,  current smoker          Patient smoked on day of surgery. Cardiovascular:    (+) hypertension:, CAD:,         Rhythm: regular                      Neuro/Psych:   (+) CVA: residual symptoms,             GI/Hepatic/Renal:   (+) GERD:,           Endo/Other:                     Abdominal:             Vascular: Other Findings:             Anesthesia Plan      general     ASA 3     (PT. DENIES UPPER GI SYMPTOMS. INTRA OP MARY RISKS BENEFITS AND ALTERNATES DISCUSSED. PT. CONSENTED.   INTRA OP MARY WAS REQUESTED BY DR Saw LUIS CENTRAL LINE PA CATHETER RISKS BENEFITS AND ALTERNATES DISCUSSED . PT. CONSENTED. PLAN GA, INVASIVE MONITORING,NTRA OP MARY , POST OP ICU CARE AND VENT. SUPPORT)  Induction: intravenous. MIPS: Postoperative opioids intended, Prophylactic antiemetics administered and Postoperative ventilation. Anesthetic plan and risks discussed with patient. Use of blood products discussed with patient whom consented to blood products. Plan discussed with CRNA.                   Ofe Rodarte MD   8/3/2021

## 2021-08-03 NOTE — ANESTHESIA PROCEDURE NOTES
Central Venous Line:    A central venous line was placed using ultrasound guidance, in the OR for the following indication(s): central venous access and CVP monitoring. 8/3/2021 8:00 AM8/3/2021 8:10 AM    Sterility preparation included the following: hand hygiene performed prior to procedure, maximum sterile barriers used and sterile technique used to drape from head to toe. The patient was placed in Trendelenburg position. The right internal jugular vein was prepped. The site was prepped with Chloraprep. A 9 Fr (size), 10 (length), introducer single lumen was placed. During the procedure, the following specific steps were taken: target vein identified, needle advanced into vein and blood aspirated and guidewire advanced into vein. Intravenous verification was obtained by venous blood return. Post insertion care included: all ports aspirated, all ports flushed easily, guidewire removed intact, Biopatch applied, line sutured in place and dressing applied. During the procedure the patient experienced: patient tolerated procedure well with no complications. Insertion site scrubbed per usage guidelines?: Yes  Skin prep agent dried for 3 minutes prior to procedure?:yes  Anesthesia type: generalA(n) non-oximetric, 7.5 (size) Pulmonary Artery Catheter (PAC) was placed through the Introducer CVL in the right internal jugular vein. The PAC placement was confirmed by pressure tracing changes and secured at 45 cm (depth). The patient experienced the following events during the procedure: patient tolerated procedure well with no complications. PA Cath placed?: Yes  Staffing  Performed: Anesthesiologist   Anesthesiologist: Radha Quan MD  Preanesthetic Checklist  Completed: patient identified, IV checked, site marked, risks and benefits discussed, surgical consent, monitors and equipment checked, pre-op evaluation, timeout performed, anesthesia consent given, oxygen available and patient being monitored

## 2021-08-03 NOTE — ANESTHESIA PROCEDURE NOTES
Procedure Performed: MARY      Start Time:  8/3/2021 8:15 AM       End Time:      Preanesthesia Checklist:  Patient identified, IV assessed, risks and benefits discussed, monitors and equipment assessed, procedure being performed at surgeon's request and anesthesia consent obtained. General Procedure Information  Diagnostic Indications for Echo:  assessment of ascending aorta, hemodynamic monitoring and assessment of valve function  Physician Requesting Echo: Michael Wilkes MD  CPT Code:  19775,03139,27386  Location performed:  OR  Intubated  Bite block placed  Heart visualized  Probe Insertion:  Easy  Probe Type:  3D  Modalities:  2D only, color flow mapping, continuous wave Doppler, M-mode and pulse wave Doppler        Anesthesia Information  Performed with CRNAs  Anesthesiologist:  Arias Treadwell MD      Echocardiogram Comments:       INTRA OP MARY. INSERTED WELL LUBRICATED 3 D MARY PROBE.  EASY ATRAUMATIC INSERTION. COMPREHENSIVE STUDY ACQUIRED. AORTIC VALVE TRICUSPID,   RESTRICTED MOTION OF LEFT CORONARY CUSP. NO AI, NOTED MODERATE AORTIC STENOSIS. CFD AS TURBULENT FLOW. ,PEAK GRADIENT 22 TO 26 MM OF HG, MEAN GRADIENT 11 TO 12 MM OF HG  AV AREA 1.2 CM SQ, NO AI , NO ASCENDING AORTIC ANEURYSM.  NON MOBILE THEROMA IN DESCENDING THORACIC AORTA. MITRAL VALVE STRUCTURALLY NORMAL. NO MV STENOSIS, TRIVIAL MR  TRICUSPID VALVE TRACE TR. PULMONARY VALVE COMPETENT. LV FUNCTION NO RWMA, EF 57%  FINDINGS DISCUSSED WITH DR Cele Troncoso    POST CPB REPORT  LV: Preserved systolic function. EF 50-55%. No RWMA  RV: Preserved systolic function. MV: No MR  AV: Unchanged. Mild to moderate AS  TV: Unchanged  PV: Unchanged  No PFO  No dissection  No effusion    All findings discussed with Dr. Deanna Dowd intraoperatively.

## 2021-08-03 NOTE — PROGRESS NOTES
The patients states that he has not had beer in two days. He is shaking and was given a warm blanket. The patient states that he is only cold. Call light in reach.

## 2021-08-04 ENCOUNTER — APPOINTMENT (OUTPATIENT)
Dept: GENERAL RADIOLOGY | Age: 51
DRG: 234 | End: 2021-08-04
Attending: THORACIC SURGERY (CARDIOTHORACIC VASCULAR SURGERY)
Payer: COMMERCIAL

## 2021-08-04 LAB
ANION GAP SERPL CALCULATED.3IONS-SCNC: 9 MEQ/L (ref 8–16)
BUN BLDV-MCNC: 6 MG/DL (ref 7–22)
CALCIUM IONIZED: 1.06 MMOL/L (ref 1.12–1.32)
CALCIUM SERPL-MCNC: 8.5 MG/DL (ref 8.5–10.5)
CHLORIDE BLD-SCNC: 99 MEQ/L (ref 98–111)
CO2: 23 MEQ/L (ref 23–33)
CREAT SERPL-MCNC: 0.7 MG/DL (ref 0.4–1.2)
EKG ATRIAL RATE: 57 BPM
EKG P AXIS: 18 DEGREES
EKG P-R INTERVAL: 248 MS
EKG Q-T INTERVAL: 394 MS
EKG QRS DURATION: 82 MS
EKG QTC CALCULATION (BAZETT): 383 MS
EKG R AXIS: 8 DEGREES
EKG T AXIS: -101 DEGREES
EKG VENTRICULAR RATE: 57 BPM
ERYTHROCYTE [DISTWIDTH] IN BLOOD BY AUTOMATED COUNT: 15 % (ref 11.5–14.5)
ERYTHROCYTE [DISTWIDTH] IN BLOOD BY AUTOMATED COUNT: 15.1 % (ref 11.5–14.5)
ERYTHROCYTE [DISTWIDTH] IN BLOOD BY AUTOMATED COUNT: 53.7 FL (ref 35–45)
ERYTHROCYTE [DISTWIDTH] IN BLOOD BY AUTOMATED COUNT: 54.1 FL (ref 35–45)
GFR SERPL CREATININE-BSD FRML MDRD: > 90 ML/MIN/1.73M2
GLUCOSE BLD-MCNC: 100 MG/DL (ref 70–108)
GLUCOSE BLD-MCNC: 102 MG/DL (ref 70–108)
GLUCOSE BLD-MCNC: 108 MG/DL (ref 70–108)
GLUCOSE BLD-MCNC: 116 MG/DL (ref 70–108)
GLUCOSE BLD-MCNC: 148 MG/DL (ref 70–108)
GLUCOSE BLD-MCNC: 156 MG/DL (ref 70–108)
GLUCOSE BLD-MCNC: 163 MG/DL (ref 70–108)
GLUCOSE BLD-MCNC: 82 MG/DL (ref 70–108)
GLUCOSE BLD-MCNC: 92 MG/DL (ref 70–108)
GLUCOSE BLD-MCNC: 96 MG/DL (ref 70–108)
HCT VFR BLD CALC: 19.6 % (ref 42–52)
HCT VFR BLD CALC: 21.9 % (ref 42–52)
HEMOGLOBIN: 6.7 GM/DL (ref 14–18)
HEMOGLOBIN: 7.4 GM/DL (ref 14–18)
MAGNESIUM: 2.5 MG/DL (ref 1.6–2.4)
MCH RBC QN AUTO: 33.5 PG (ref 26–33)
MCH RBC QN AUTO: 33.5 PG (ref 26–33)
MCHC RBC AUTO-ENTMCNC: 33.8 GM/DL (ref 32.2–35.5)
MCHC RBC AUTO-ENTMCNC: 34.2 GM/DL (ref 32.2–35.5)
MCV RBC AUTO: 98 FL (ref 80–94)
MCV RBC AUTO: 99.1 FL (ref 80–94)
PATHOLOGIST REVIEW: ABNORMAL
PLATELET # BLD: 111 THOU/MM3 (ref 130–400)
PLATELET # BLD: 99 THOU/MM3 (ref 130–400)
PMV BLD AUTO: 8.6 FL (ref 9.4–12.4)
PMV BLD AUTO: 9.3 FL (ref 9.4–12.4)
POTASSIUM SERPL-SCNC: 4.6 MEQ/L (ref 3.5–5.2)
POTASSIUM SERPL-SCNC: 4.7 MEQ/L (ref 3.5–5.2)
RBC # BLD: 2 MILL/MM3 (ref 4.7–6.1)
RBC # BLD: 2.21 MILL/MM3 (ref 4.7–6.1)
SODIUM BLD-SCNC: 131 MEQ/L (ref 135–145)
WBC # BLD: 4.2 THOU/MM3 (ref 4.8–10.8)
WBC # BLD: 4.9 THOU/MM3 (ref 4.8–10.8)

## 2021-08-04 PROCEDURE — 97530 THERAPEUTIC ACTIVITIES: CPT

## 2021-08-04 PROCEDURE — 2580000003 HC RX 258: Performed by: THORACIC SURGERY (CARDIOTHORACIC VASCULAR SURGERY)

## 2021-08-04 PROCEDURE — 97166 OT EVAL MOD COMPLEX 45 MIN: CPT

## 2021-08-04 PROCEDURE — 97163 PT EVAL HIGH COMPLEX 45 MIN: CPT

## 2021-08-04 PROCEDURE — 84132 ASSAY OF SERUM POTASSIUM: CPT

## 2021-08-04 PROCEDURE — 6370000000 HC RX 637 (ALT 250 FOR IP): Performed by: THORACIC SURGERY (CARDIOTHORACIC VASCULAR SURGERY)

## 2021-08-04 PROCEDURE — 83735 ASSAY OF MAGNESIUM: CPT

## 2021-08-04 PROCEDURE — 6360000002 HC RX W HCPCS: Performed by: THORACIC SURGERY (CARDIOTHORACIC VASCULAR SURGERY)

## 2021-08-04 PROCEDURE — 82330 ASSAY OF CALCIUM: CPT

## 2021-08-04 PROCEDURE — 2060000000 HC ICU INTERMEDIATE R&B

## 2021-08-04 PROCEDURE — 85027 COMPLETE CBC AUTOMATED: CPT

## 2021-08-04 PROCEDURE — 82948 REAGENT STRIP/BLOOD GLUCOSE: CPT

## 2021-08-04 PROCEDURE — 93005 ELECTROCARDIOGRAM TRACING: CPT | Performed by: THORACIC SURGERY (CARDIOTHORACIC VASCULAR SURGERY)

## 2021-08-04 PROCEDURE — 80048 BASIC METABOLIC PNL TOTAL CA: CPT

## 2021-08-04 PROCEDURE — 36415 COLL VENOUS BLD VENIPUNCTURE: CPT

## 2021-08-04 PROCEDURE — 71045 X-RAY EXAM CHEST 1 VIEW: CPT

## 2021-08-04 RX ORDER — FUROSEMIDE 10 MG/ML
20 INJECTION INTRAMUSCULAR; INTRAVENOUS 2 TIMES DAILY
Status: DISCONTINUED | OUTPATIENT
Start: 2021-08-04 | End: 2021-08-07

## 2021-08-04 RX ORDER — POTASSIUM CHLORIDE 20 MEQ/1
20 TABLET, EXTENDED RELEASE ORAL
Status: DISCONTINUED | OUTPATIENT
Start: 2021-08-04 | End: 2021-08-04

## 2021-08-04 RX ORDER — PANTOPRAZOLE SODIUM 40 MG/1
40 TABLET, DELAYED RELEASE ORAL
Status: DISCONTINUED | OUTPATIENT
Start: 2021-08-04 | End: 2021-08-09 | Stop reason: HOSPADM

## 2021-08-04 RX ADMIN — PANTOPRAZOLE SODIUM 40 MG: 40 TABLET, DELAYED RELEASE ORAL at 08:42

## 2021-08-04 RX ADMIN — Medication 1 TABLET: at 08:41

## 2021-08-04 RX ADMIN — OXYCODONE 10 MG: 5 TABLET ORAL at 09:02

## 2021-08-04 RX ADMIN — BISACODYL 10 MG: 5 TABLET, COATED ORAL at 22:02

## 2021-08-04 RX ADMIN — FUROSEMIDE 20 MG: 10 INJECTION, SOLUTION INTRAMUSCULAR; INTRAVENOUS at 17:26

## 2021-08-04 RX ADMIN — ATORVASTATIN CALCIUM 20 MG: 20 TABLET, FILM COATED ORAL at 22:02

## 2021-08-04 RX ADMIN — OXYCODONE 5 MG: 5 TABLET ORAL at 20:55

## 2021-08-04 RX ADMIN — CALCIUM GLUCONATE 1500 MG: 98 INJECTION, SOLUTION INTRAVENOUS at 09:03

## 2021-08-04 RX ADMIN — FUROSEMIDE 20 MG: 10 INJECTION, SOLUTION INTRAMUSCULAR; INTRAVENOUS at 08:41

## 2021-08-04 RX ADMIN — INSULIN LISPRO 3 UNITS: 100 INJECTION, SOLUTION INTRAVENOUS; SUBCUTANEOUS at 12:12

## 2021-08-04 RX ADMIN — BISACODYL 10 MG: 5 TABLET, COATED ORAL at 08:41

## 2021-08-04 RX ADMIN — SODIUM CHLORIDE, PRESERVATIVE FREE 10 ML: 5 INJECTION INTRAVENOUS at 09:03

## 2021-08-04 RX ADMIN — ENOXAPARIN SODIUM 40 MG: 40 INJECTION SUBCUTANEOUS at 08:42

## 2021-08-04 RX ADMIN — POTASSIUM CHLORIDE 20 MEQ: 1500 TABLET, EXTENDED RELEASE ORAL at 08:42

## 2021-08-04 RX ADMIN — ASPIRIN 325 MG: 325 TABLET, COATED ORAL at 08:42

## 2021-08-04 RX ADMIN — CEFAZOLIN 2000 MG: 10 INJECTION, POWDER, FOR SOLUTION INTRAVENOUS at 14:53

## 2021-08-04 RX ADMIN — ONDANSETRON 4 MG: 2 INJECTION INTRAMUSCULAR; INTRAVENOUS at 12:12

## 2021-08-04 RX ADMIN — SODIUM CHLORIDE, PRESERVATIVE FREE 10 ML: 5 INJECTION INTRAVENOUS at 22:02

## 2021-08-04 RX ADMIN — OXYCODONE 5 MG: 5 TABLET ORAL at 05:19

## 2021-08-04 RX ADMIN — CEFAZOLIN 2000 MG: 10 INJECTION, POWDER, FOR SOLUTION INTRAVENOUS at 22:58

## 2021-08-04 RX ADMIN — ONDANSETRON 4 MG: 2 INJECTION INTRAMUSCULAR; INTRAVENOUS at 05:50

## 2021-08-04 RX ADMIN — CEFAZOLIN 2000 MG: 10 INJECTION, POWDER, FOR SOLUTION INTRAVENOUS at 08:40

## 2021-08-04 ASSESSMENT — PAIN DESCRIPTION - PAIN TYPE
TYPE: SURGICAL PAIN
TYPE: SURGICAL PAIN

## 2021-08-04 ASSESSMENT — PAIN SCALES - GENERAL
PAINLEVEL_OUTOF10: 2
PAINLEVEL_OUTOF10: 7
PAINLEVEL_OUTOF10: 7
PAINLEVEL_OUTOF10: 6
PAINLEVEL_OUTOF10: 0
PAINLEVEL_OUTOF10: 6
PAINLEVEL_OUTOF10: 4

## 2021-08-04 ASSESSMENT — PAIN DESCRIPTION - LOCATION
LOCATION: CHEST
LOCATION: CHEST

## 2021-08-04 ASSESSMENT — PAIN DESCRIPTION - ORIENTATION: ORIENTATION: LOWER

## 2021-08-04 NOTE — PROGRESS NOTES
[facility]  INPATIENT OCCUPATIONAL THERAPY  [facility] ICU STEPDOWN TELEMETRY 4K  EVALUATION    Time:   Time In: 1334  Time Out: 1420  Timed Code Treatment Minutes: 32 Minutes  Minutes: 46          Date: 2021  Patient Name: Perez Sigala,   Gender: male      MRN: [id number]  : 1970  (46 y.o.)  Referring Practitioner: Isabel Gan MD  Diagnosis: CAD, multiple vessel  Additional Pertinent Hx: 46year old male non-diabetic presents with worsening fatigue and syncopal episodes without chest pain. Murmur detected on physical exam prompted TTE & MARY, essentially showing low normal LVEF and mild AS (peak gradient 22). LHC shows severe multivessel CAD. Patient incidentally reports onset of left-sided weakness after COVID vaccination in February, which has improved. Previous heavy smoker, in process of quitting. Reports drinks approx 8 beers per day. Pt with hx of stroke (L sided residual deficits) is s/p CABG x 3 (DOS: 8/3/21). Restrictions/Precautions:  Restrictions/Precautions: Cardiac, Surgical Protocols, Fall Risk  Position Activity Restriction  Sternal Precautions: No Pushing, No Pulling, 10# Lifting Restrictions  Other position/activity restrictions: Chest tubes. Pt had CVA in 2021 effect L side. Subjective  Chart Reviewed: Yes, Orders, Progress Notes, History and Physical, Operative Notes  Patient assessed for rehabilitation services?: Yes  Response to previous treatment: Patient with no complaints from previous session  Family / Caregiver Present: No    Subjective: RN approved of OT session. Pt supine in bed on arrival. Pt agreed to participate in OT session.     Pain:  Pain Assessment  Patient Currently in Pain: Yes  Pain Level: 6  Pain Type: Surgical pain  Pain Location: Chest    Vitals: Blood Pressure: 143/73 prior to session, supine in bed   Oxygen: 99% prior to session, supine in bed   Heart Rate: 89 bpm after session     Social/Functional History:  Lives With: Son (son will be around most of the time to help out when needed)  Type of Home: House  Home Layout: One level  Home Access: Stairs to enter without rails  Entrance Stairs - Number of Steps: 1 + 2  Home Equipment: 4 wheeled walker, Cane, Quad cane (pt uses the cane before admission, just got the walker last week)   Bathroom Shower/Tub: Tub/Shower unit  Bathroom Toilet: Standard  Bathroom Equipment: Tub transfer bench    Receives Help From: Friend(s)  ADL Assistance: Independent  Homemaking Assistance: Independent  Homemaking Responsibilities: Yes  Ambulation Assistance: Needs assistance  Transfer Assistance: Independent    Active : Yes  Mode of Transportation: Car  Occupation: Retired  Additional Comments: Pt reports he is dependent on cane at baseline due to hx of stroke with L-sided deficits    VISION:Corrected - wears glasses    HEARING:  WFL    COGNITION: Slow Processing, Decreased Insight, Impaired Memory, Decreased Problem Solving, Decreased Safety Awareness, Impaired Attention, Difficulty Following Commands and Impulsive   *pt required max cueing for safety awareness. Pt also required visual demonstrations to understand how to appropriately transfer. RANGE OF MOTION:  Bilateral Upper Extremity:  WFL     STRENGTH:  Bilateral Upper Extremity:  Not Tested    SENSATION:   Pt reported he had lack of sensation in B LE, especially L LE from prior cva     ADL:   LB dressing: dependent for adjusting socks. BALANCE:  Sitting Balance:  Contact Guard Assistance, X 1, with cues for safety, with increased time for completion. Standing Balance: CGA - Min A, X1 with RW, with increased time. Pt reported some dizziness when initially standing but then would reside after a few sconds. .      BED MOBILITY:  Supine to Sit: Mod-min A at shoulders/trunk    Sit to Supine: Pt insisted on completing independently, however, required mod A half way through due to increased pain.    Scooting: Maximum Assistance      TRANSFERS:  Sit to Stand:  Pt completed 2 sit to stand transfers during OT session . First yousif, pt req Min A x2. During second sit to stand transfer , pt had x2 attempts of only clearing bottom from chair with unsuccessful transfer and req mod A  x 2 on 3rd attempt to successfully perform transfer. Pt reported 2nd trasnfer was challenging due to fatigue in LE. Stand to Sit: Min A x1 with max cuing for hand placement and safety. FUNCTIONAL MOBILITY:  Assistive Device: Rolling Walker  Assist Level:  Contact Guard Assistance x1 + x1 SBA for line management. With increased time to complete. Req vc's to stay within RW and cues for walker safety   Distance: from bed to bedswide chair. From bedside chair back to bed. Exercise:  Reviewed sternal precautions with pt able to recall 1/3 (not using arms to sit up). Was able to recall 3/3 precautions when given verbal prompts. Activity Tolerance:  Patient tolerance of  treatment: fair. Assessment:  Assessment: Pt requires assistance for ADLs, transfers, and functional mobility in comparison to PLOF. Pt will benefit from continued skill OT services to increase independence in these tasks, as well as, increase strength and endurance to return back to PLOF. Performance deficits / Impairments: Decreased functional mobility , Decreased safe awareness, Decreased balance, Decreased coordination, Decreased ADL status, Decreased cognition, Decreased posture, Decreased endurance, Decreased high-level IADLs, Decreased strength, Decreased sensation  Prognosis: Good  REQUIRES OT FOLLOW UP: Yes  Decision Making: Medium Complexity    Treatment Initiated: Treatment and education initiated within context of evaluation.   Evaluation time included review of current medical information, gathering information related to past medical, social and functional history, completion of standardized testing, formal and informal observation of tasks, assessment of data and development of plan of care and goals. Treatment time included skilled education and facilitation of tasks to increase safety and independence with ADL's for improved functional independence and quality of life. Discharge Recommendations:  Continue to assess pending progress, IP Rehab, Patient would benefit from continued therapy after discharge    Patient Education:  OT Education: OT Role, Plan of Care, Precautions, ADL Adaptive Strategies, Transfer Training, Energy Conservation  Barriers to Learning: congition: pt req max vc's for safety awareness throughout the session. Equipment Recommendations:  Equipment Needed: No    Plan:  Times per week: 6x  Times per day: Daily  Current Treatment Recommendations: Strengthening, Positioning, Pain Management, Safety Education & Training, Balance Training, Self-Care / ADL, Patient/Caregiver Education & Training, Cognitive/Perceptual Training, Functional Mobility Training, Endurance Training. See long-term goal time frame for expected duration of plan of care. If no long-term goals established, a short length of stay is anticipated. Goals:     Short term goals  Time Frame for Short term goals: by discharge  Short term goal 1: Pt will complete CABG step II exercises X10 reps to increase overall strength/endurance needed for ADL tasks. Short term goal 2: pt will complete function transfers CGA with min vc to follow sternal precautions and min vc's for safety  Short term goal 3: Pt will perform functional mobility SBA within [place] distances to perform BADLs  Short term goal 4: Pt will perform LB dressing with min A with LHAE  prn to increase independence with self care. Following session, patient left in safe position with all fall risk precautions in place.

## 2021-08-04 NOTE — PLAN OF CARE
Problem: Falls - Risk of:  Goal: Will remain free from falls  Description: Will remain free from falls  Outcome: Ongoing  Note: Bed alarm active. Call light within reach. Tele sitter at bedside. Goal: Absence of physical injury  Description: Absence of physical injury  Outcome: Ongoing     Problem: Cardiac Output - Decreased:  Goal: Hemodynamic stability will improve  Description: Hemodynamic stability will improve  Outcome: Ongoing  Note: Cleviprex at 2 for bp control. Other hemodynamics wnl     Problem: Pain:  Goal: Pain level will decrease  Description: Pain level will decrease  Outcome: Ongoing  Note: Prn pain medicine offered and given per pt request. Pt able to communicate pain effectively     Problem: Serum Glucose Level - Abnormal:  Goal: Ability to maintain appropriate glucose levels will improve to within specified parameters  Description: Ability to maintain appropriate glucose levels will improve to within specified parameters  Outcome: Ongoing  Note: Insulin gtt infusing for strict blood glucose control     Problem: Skin Integrity - Impaired:  Goal: Will show no infection signs and symptoms  Description: Will show no infection signs and symptoms  Outcome: Ongoing  Note: No current signs of infection  Goal: Absence of new skin breakdown  Description: Absence of new skin breakdown  Outcome: Ongoing    Care plan reviewed with patient. Patient verbalize understanding of the plan of care and contribute to goal setting.

## 2021-08-04 NOTE — PROGRESS NOTES
[address] [facility] PHYSICAL THERAPY  EVALUATION  [facility] ICU 4D - 4D-11/011-A    Time In: 0910  Time Out: 7275  Timed Code Treatment Minutes: 28 Minutes  Minutes: 44          Date: 2021  Patient Name: David Chan,  Gender:  male        MRN: [id number]  : 1970  (46 y.o.)      Referring Practitioner: Isaac Goldsmith MD  Diagnosis: CAD, multiple vessel  Additional Pertinent Hx: Pt with hx of stroke (L sided residual deficits) is s/p CABG x 3 (DOS: 8/3/21). Restrictions/Precautions:  Restrictions/Precautions: General Precautions, Surgical Protocols, Cardiac, Fall Risk  Position Activity Restriction  Sternal Precautions: No Pushing, No Pulling, 5# Lifting Restrictions  Other position/activity restrictions: chest tubes    Subjective:  Chart Reviewed: Yes  Patient assessed for rehabilitation services?: Yes  Family / Caregiver Present: No  Subjective: RN approved PT evaluation, requesting that pt return to bed so lines can be pulled. Pt agreeable to therapy, but very nauseous throughout session, limiting extent of treatment. Pt did vomit a small amount during session and requested cool rag to cool head.      General:  Follows Commands: Within Functional Limits    Vision: Impaired  Vision Exceptions: Wears glasses at all times    Hearing: Within functional limits    Pain: chest, not quantified     Vitals: Blood Pressure: 107/64 in recliner--lightheaded; 152/92 sitting EOB following ambulation  Oxygen: remained in 90%s throughout on 3L O2   HR remained stable throughout     Social/Functional History:    Lives With: Son (son will be around most of the time to help out when needed)  Type of Home: House  Home Layout: One level  Home Access: Stairs to enter without rails  Entrance Stairs - Number of Steps: 1 + 2  Home Equipment: 4 wheeled walker, Cane, Quad cane (pt uses the cane before admission, just got the walker last week)     Receives Help From: Friend(s)     Ambulation Assistance: Needs assistance  Transfer Assistance: Independent    Active : Yes     Additional Comments: Pt reports he is dependent on cane at baseline due to hx of stroke with L-sided deficits    OBJECTIVE:  Range of Motion:  Bilateral Lower Extremity: WFL    Strength:  Bilateral Lower Extremity: Impaired - functional weakness noted with transfers and ambulation, L side worse than R due to residual deficits from stroke    Balance:  Static Sitting Balance:  Stand By Assistance  Dynamic Sitting Balance: Stand By Assistance  Static Standing Balance: Contact Guard Assistance, X 1, + assist to manage lines  Dynamic Standing Balance: Contact Guard Assistance/ Min Assistance of 1-2 assist, with 2nd person also managing lines     Bed Mobility:  Rolling to Right: Minimal Assistance, X 1, with verbal cues , with increased time for completion, cues to not pull with hands   Sit to Supine: Mod A x 1 + Min A x 1   Scooting: SBA to scoot forward in chair--cues for not using hands, CGA x 2 with several cues for technique to scoot laterally in bed in supine; dependent to scoot towards HOB    Transfers:  Sit to Stand: Minimal Assistance, X 2, with increased time for completion, cues for hand placement, with verbal cues  Stand to Sit:Min A x 1 + CGA x 1    Ambulation:  Contact Guard Assistance, X 1, with cues for safety, with verbal cues , with increased time for completion, + assist for managing lines  Distance: a few feet from chair to bed   Surface: Level Tile  Device:No Device  Gait Deviations:  Slow Justyna, Decreased Step Length Bilaterally, Decreased Gait Speed and Unsteady Gait, guarded     Exercise:  Patient was guided in 1 set(s) 15 reps of exercise to both lower extremities. Seated heel raises. Pt requested to defer further exercises due to feeling very nauseous and vomiting multiple times. Exercises were completed for increased independence with functional mobility.     Functional Outcome Measures: Completed  AM-PAC Inpatient Mobility Raw Score : 12  AM-PAC Inpatient T-Scale Score : 35.33     ASSESSMENT:  Activity Tolerance:  Patient tolerance of  treatment: fair. Limited by nausea and vomiting. Treatment Initiated: Treatment and education initiated within context of evaluation. Evaluation time included review of current medical information, gathering information related to past medical, social and functional history, completion of standardized testing, formal and informal observation of tasks, assessment of data and development of plan of care and goals. Treatment time included skilled education and facilitation of tasks to increase safety and independence with functional mobility for improved independence and quality of life. Assessment: Body structures, Functions, Activity limitations: Decreased strength, Decreased safe awareness, Decreased endurance, Decreased posture, Decreased balance, Decreased functional mobility , Increased pain  Assessment: Pt is below PLOF by way of transfers and ambulation s/p CABG. He is primarily limited by decreased endurance, strength, decreased safety awareness, and sternal precautions. Pt will benefit from continued physical therapy to return to PLOF.   Prognosis: Good    REQUIRES PT FOLLOW UP: Yes    Discharge Recommendations:  Discharge Recommendations: Continue to assess pending progress, Patient would benefit from continued therapy after discharge    Patient Education:  PT Education: Goals, General Safety, PT Role, Energy Conservation, Transfer Training, Precautions, Plan of Care, Functional Mobility Training    Equipment Recommendations:  Equipment Needed: No    Plan:  Times per week: 6 x CABG  Times per day: Daily  Current Treatment Recommendations: Strengthening, Home Exercise Program, Balance Training, Endurance Training, Safety Education & Training, Patient/Caregiver Education & Training, Functional Mobility Training, Transfer Training, Gait Training, Stair

## 2021-08-04 NOTE — CARE COORDINATION
8/4/21, 8:25 AM EDT  DISCHARGE PLANNING EVALUATION:    Franklyn Gee       Admitted: 8/3/2021/ Radha Adams day: 1   Location: -11/011-A Reason for admit: CAD, multiple vessel [I25.10]   PMH:  has a past medical history of Acid reflux, Arthritis, CAD (coronary artery disease), Chronic back pain, Essential hypertension, and Stroke ([address] Procedure:   8/3 3v CABG  8/3 Intubated - 8/3 extubated  8/4 CXR: Perihilar and left basilar atelectasis or consolidation    Barriers to Discharge: POD #1. Lines out today and transferred to stepdown this shift. Received 1 PRBC yesterday. Afebrile. NSR. On room air. Ox4. Follows commands. CT x4 to -20sx with 1095 ml out since surgery. CR/PT/OT. Dietitian consulted. Dietary ileus prevention protocol. Telemetry, I&O, daily weight, IS, sternal precautions, CT care, wound care, SCDs, koehler care, ambulate. Asa, lipitor, IV ancef, lovenox, IV lasix 20 mg bid, SSI, prn IV morphine, prn IV zofran, prn roxicodone, protonix, K+, electrolyte replacement protocols. Na+ 130 - now 131, wbc 4 - now 4.2, hgb 7.8 - 8 - now 7.4, plt 92 - 111 - now 99. Urine sent for culture. PCP: RACHEL Will - CNP  Readmission Risk Score: 11%    Patient Goals/Plan/Treatment Preferences: Spoke with Marcie Barajas; states he lives at home with his adult son. He uses a cane. He states he has a single point cane, 4-prong cane, 4 wheeled walker, and shower chair. He states his toilet is standard height. He is retired. He drives, cares for himself independently, and has a PCP. SW on case for discharge planning. Transportation/Food Security/Housekeeping Addressed:  No issues identified.

## 2021-08-04 NOTE — PROGRESS NOTES
Comprehensive Nutrition Assessment    Type and Reason for Visit:  Initial, Consult, Patient Education (s/p OHS surgery, ileus prevention protocol)    Nutrition Recommendations/Plan:   ONS initiated: Ensure Enlive TID. Ileus prevention protocol initiated: activia yogurt TID, pt declined hot beverage with meals. Continue MVI and current diet. Nutrition Assessment:    Pt. nutritionally compromised AEB reports of poor appetite/intake prior to admit and now. At risk for further nutrition compromise r/t admit with CAD, s/p OHS 8/3/21, nausea, increased nutrient needs to support post-op wound healing, alcohol abuse hx,  and underlying medical condition (hx: HTN, CAD, CVA, acid reflux, smoking, alcohol abuse). Nutrition recommendations/interventions as per above. Malnutrition Assessment:  Malnutrition Status: At risk for malnutrition (Comment)    Context:  Chronic Illness     Findings of the 6 clinical characteristics of malnutrition:  Energy Intake:  7 - 75% or less estimated energy requirements for 1 month or longer  Weight Loss:  No significant weight loss (Note 4.9% loss in the last 5 months)     Body Fat Loss:  No significant body fat loss     Muscle Mass Loss:  No significant muscle mass loss    Fluid Accumulation:  Unable to assess (diuresing post-OHS)     Strength:  Not Performed    Estimated Daily Nutrient Needs:  Energy (kcal):  5897-5116 kcals (25-30 kcals/kg/day); Weight Used for Energy Requirements:   (77 kg)     Protein (g):   grams (1.2-2 grams/kg/day); Weight Used for Protein Requirements:   (77 kg)          Nutrition Related Findings:  Patient seen earlier this morning, lunch tray in front of him untouched, feeling very nauseated. s/p OHS 8/3/21, 3 vessels. 3/25/21: Cholesterol 111, HDL 50, LDL 51, TG 51.  7/23/21: HgbA1c 5.9%/117 average glucose. 8/4/21: Sodium 131, BUN 6, Chemstick 102. Rx: lipitor, ancef, lasix, MVI, humalog.  Pt  reports very poor appetite, poor eater prior to admit, typically1 meal a day prior to admit. Note history of alcohol abuse, 8 beer/day. States he does not exercise. Tries not to add salt to his foods. He was aware of recommendations for activa but declined hot beverage for ileus prevention. He was agreeable to ONS to assist with poor oral intake. Wounds:  Surgical Incision (s/p OHS 8/3/21, sternum and right leg incision)       Current Nutrition Therapies:    ADULT DIET; Regular; 3 carb choices (45 gm/meal); Low Sodium (2 gm)  Adult Oral Nutrition Supplement; Other Oral Supplement; activia, patient does not want hot beverage with meals  Adult Oral Nutrition Supplement; Standard High Calorie/High Protein Oral Supplement    Anthropometric Measures:  · Height: 5' 10\" (177.8 cm)  · Current Body Weight: 169 lb 5 oz (76.8 kg) (8/4/21 no edema)   · Admission Body Weight: 160 lb 3.2 oz (72.7 kg) (8/3/21 no edema)    · Usual Body Weight:  (Per pt 155-160#. Per EMR: 3/25/21: 178#, 7/23/21: 158#15oz)     · Ideal Body Weight: 166 lbs;   · BMI: 24.3  · Adjusted Body Weight:  ; No Adjustment   · BMI Categories: Normal Weight (BMI 18.5-24. 9)       Nutrition Diagnosis:   · Inadequate oral intake related to  (poor appetite) as evidenced by NPO or clear liquid status due to medical condition, intake 0-25%      Nutrition Interventions:   Food and/or Nutrient Delivery:  Continue Current Diet, Start Oral Nutrition Supplement  Nutrition Education/Counseling:  Education completed (Cardiac diet reinforced, handout given as well 8/4/21. Encouraged oral intake and ONS use.)   Coordination of Nutrition Care:  Continue to monitor while inpatient    Goals:  Patient will consume 75% or more of meals during LOS.        Nutrition Monitoring and Evaluation:   Behavioral-Environmental Outcomes:  None Identified   Food/Nutrient Intake Outcomes:  Food and Nutrient Intake, Supplement Intake, Vitamin/Mineral Intake  Physical Signs/Symptoms Outcomes:  GI Status, Fluid Status or Edema,

## 2021-08-04 NOTE — ANESTHESIA POSTPROCEDURE EVALUATION
Department of Anesthesiology  Postprocedure Note    Patient: Laura Rodriguez  MRN: [id number]  YOB: 1970  Date of evaluation: 8/4/2021  Time:  8:12 AM     Procedure Summary     Date: 08/03/21 Room / Location: [address]    Anesthesia Start: 0740 Anesthesia Stop: 2820    Procedure: CABG X 3, MARY (N/A Chest) Diagnosis: (CAD, MULTIPLE VESSEL)    Surgeons: Maya Jeffery MD Responsible Provider: Ricardo Gonzalez MD    Anesthesia Type: general ASA Status: 3          Anesthesia Type: general    Fausto Phase I: Fausto Score: 10    Fausto Phase II:      Last vitals: Reviewed and per EMR flowsheets. Anesthesia Post Evaluation    Patient location during evaluation: ICU  Patient participation: complete - patient participated  Level of consciousness: awake and alert  Pain score: 0  Airway patency: patent  Nausea & Vomiting: no vomiting and no nausea  Complications: no  Cardiovascular status: hemodynamically stable  Respiratory status: spontaneous ventilation  Hydration status: stable  Comments: Plans for patient to be moved out of ICU.

## 2021-08-04 NOTE — PROGRESS NOTES
Cardiovascular Surgery Progress Note      Comfortable on NC  Good hemodynamics, no inotropes  EKG no acute changes  CXR no space issues  Labs ok  -Routine diuresis  -Transfer floor

## 2021-08-04 NOTE — FLOWSHEET NOTE
08/04/21 1300   Encounter Summary   Services provided to: Patient   Referral/Consult From: Rounding   Continue Visiting Yes  (8/4 NR)   Complexity of Encounter Low   Length of Encounter 15 minutes   Routine   Type Initial   Assessment Unable to respond   Intervention Prayer   During my encounter with the 46 yr old patient, I attempted to visit with the pt on 4K . The patient appears to be resting now and I didnt want to disturb the patient. The pt was originally admitted to 4D/ ICU and transferred to ICU stepdown 4K. The pt was admitted due to CAD multiple vessels. I or another Randolph Domingo will attempt to visit the patient or the family at another time.

## 2021-08-05 ENCOUNTER — APPOINTMENT (OUTPATIENT)
Dept: GENERAL RADIOLOGY | Age: 51
DRG: 234 | End: 2021-08-05
Attending: THORACIC SURGERY (CARDIOTHORACIC VASCULAR SURGERY)
Payer: COMMERCIAL

## 2021-08-05 LAB
ANION GAP SERPL CALCULATED.3IONS-SCNC: 11 MEQ/L (ref 8–16)
BUN BLDV-MCNC: 12 MG/DL (ref 7–22)
CALCIUM SERPL-MCNC: 9 MG/DL (ref 8.5–10.5)
CHLORIDE BLD-SCNC: 92 MEQ/L (ref 98–111)
CO2: 24 MEQ/L (ref 23–33)
CREAT SERPL-MCNC: 0.9 MG/DL (ref 0.4–1.2)
ERYTHROCYTE [DISTWIDTH] IN BLOOD BY AUTOMATED COUNT: 15.5 % (ref 11.5–14.5)
ERYTHROCYTE [DISTWIDTH] IN BLOOD BY AUTOMATED COUNT: 56.4 FL (ref 35–45)
GFR SERPL CREATININE-BSD FRML MDRD: 88 ML/MIN/1.73M2
GLUCOSE BLD-MCNC: 124 MG/DL (ref 70–108)
GLUCOSE BLD-MCNC: 132 MG/DL (ref 70–108)
GLUCOSE BLD-MCNC: 165 MG/DL (ref 70–108)
GLUCOSE BLD-MCNC: 178 MG/DL (ref 70–108)
GLUCOSE BLD-MCNC: 179 MG/DL (ref 70–108)
HCT VFR BLD CALC: 24.8 % (ref 42–52)
HEMOGLOBIN: 8.1 GM/DL (ref 14–18)
MCH RBC QN AUTO: 33.1 PG (ref 26–33)
MCHC RBC AUTO-ENTMCNC: 32.7 GM/DL (ref 32.2–35.5)
MCV RBC AUTO: 101.2 FL (ref 80–94)
PLATELET # BLD: 115 THOU/MM3 (ref 130–400)
PMV BLD AUTO: 9.4 FL (ref 9.4–12.4)
POTASSIUM SERPL-SCNC: 4.5 MEQ/L (ref 3.5–5.2)
RBC # BLD: 2.45 MILL/MM3 (ref 4.7–6.1)
SODIUM BLD-SCNC: 127 MEQ/L (ref 135–145)
URINE CULTURE, ROUTINE: NORMAL
WBC # BLD: 1.9 THOU/MM3 (ref 4.8–10.8)

## 2021-08-05 PROCEDURE — 71045 X-RAY EXAM CHEST 1 VIEW: CPT

## 2021-08-05 PROCEDURE — 97110 THERAPEUTIC EXERCISES: CPT

## 2021-08-05 PROCEDURE — 97535 SELF CARE MNGMENT TRAINING: CPT

## 2021-08-05 PROCEDURE — 97116 GAIT TRAINING THERAPY: CPT

## 2021-08-05 PROCEDURE — 85027 COMPLETE CBC AUTOMATED: CPT

## 2021-08-05 PROCEDURE — 2060000000 HC ICU INTERMEDIATE R&B

## 2021-08-05 PROCEDURE — 2580000003 HC RX 258: Performed by: THORACIC SURGERY (CARDIOTHORACIC VASCULAR SURGERY)

## 2021-08-05 PROCEDURE — 80048 BASIC METABOLIC PNL TOTAL CA: CPT

## 2021-08-05 PROCEDURE — 97530 THERAPEUTIC ACTIVITIES: CPT

## 2021-08-05 PROCEDURE — 6370000000 HC RX 637 (ALT 250 FOR IP): Performed by: THORACIC SURGERY (CARDIOTHORACIC VASCULAR SURGERY)

## 2021-08-05 PROCEDURE — 36415 COLL VENOUS BLD VENIPUNCTURE: CPT

## 2021-08-05 PROCEDURE — 6360000002 HC RX W HCPCS: Performed by: THORACIC SURGERY (CARDIOTHORACIC VASCULAR SURGERY)

## 2021-08-05 PROCEDURE — 82948 REAGENT STRIP/BLOOD GLUCOSE: CPT

## 2021-08-05 RX ORDER — POLYETHYLENE GLYCOL 3350 17 G/17G
17 POWDER, FOR SOLUTION ORAL DAILY PRN
COMMUNITY

## 2021-08-05 RX ORDER — LISINOPRIL 40 MG/1
40 TABLET ORAL 2 TIMES DAILY
Status: ON HOLD | COMMUNITY
End: 2021-08-09 | Stop reason: HOSPADM

## 2021-08-05 RX ADMIN — PANTOPRAZOLE SODIUM 40 MG: 40 TABLET, DELAYED RELEASE ORAL at 05:00

## 2021-08-05 RX ADMIN — Medication 1 TABLET: at 10:19

## 2021-08-05 RX ADMIN — SODIUM CHLORIDE, PRESERVATIVE FREE 10 ML: 5 INJECTION INTRAVENOUS at 10:20

## 2021-08-05 RX ADMIN — METOPROLOL TARTRATE 25 MG: 25 TABLET, FILM COATED ORAL at 21:00

## 2021-08-05 RX ADMIN — ASPIRIN 325 MG: 325 TABLET, COATED ORAL at 10:19

## 2021-08-05 RX ADMIN — ENOXAPARIN SODIUM 40 MG: 40 INJECTION SUBCUTANEOUS at 10:20

## 2021-08-05 RX ADMIN — FUROSEMIDE 20 MG: 10 INJECTION, SOLUTION INTRAMUSCULAR; INTRAVENOUS at 18:36

## 2021-08-05 RX ADMIN — BISACODYL 10 MG: 5 TABLET, COATED ORAL at 10:19

## 2021-08-05 RX ADMIN — FUROSEMIDE 20 MG: 10 INJECTION, SOLUTION INTRAMUSCULAR; INTRAVENOUS at 10:20

## 2021-08-05 RX ADMIN — BISACODYL 10 MG: 5 TABLET, COATED ORAL at 21:00

## 2021-08-05 RX ADMIN — OXYCODONE 10 MG: 5 TABLET ORAL at 04:58

## 2021-08-05 RX ADMIN — INSULIN LISPRO 3 UNITS: 100 INJECTION, SOLUTION INTRAVENOUS; SUBCUTANEOUS at 07:58

## 2021-08-05 RX ADMIN — ATORVASTATIN CALCIUM 20 MG: 20 TABLET, FILM COATED ORAL at 21:00

## 2021-08-05 ASSESSMENT — PAIN SCALES - GENERAL
PAINLEVEL_OUTOF10: 6
PAINLEVEL_OUTOF10: 4

## 2021-08-05 ASSESSMENT — PAIN DESCRIPTION - PAIN TYPE: TYPE: ACUTE PAIN

## 2021-08-05 NOTE — PROGRESS NOTES
Cardiovascular Surgery Progress Note      Comfortable on NC  Stable, SR  Generous serous chest tube output  CXR no space issues  Labs noted  -Continue IV diuresis  -D/c epicardial wires  -Begin DAPT (clopidogrel + baby ASA)  -Keep chest tubes in

## 2021-08-05 NOTE — CARE COORDINATION
DISCHARGE/PLANNING EVALUATION  8/5/21, 9:52 AM EDT    Reason for Referral: Discharge planning post OHS. Mental Status: Alert and oriented. Decision Making: Makes own decisions. Family/Social/Home Environment: Assessment completed with pt while on 4K. Pt states he resides in a one story home and his son lives with him. Pt states son is not employed and can stay with him 24/7 x 2 weeks. Pt states he is retired and tries to stay active post his stroke in March of 2021. Pt states he does drive, cooks, cleans, and whatever needs done around the house. Pt states he does use his cane. Current Services including food security, transportation and housekeeping: See note above. Current Equipment: wooded cane, 4 prong cane, rollator, shower chair and a lift chair. Payment Source: Chari Heath (private policy post assisted)  Concerns or Barriers to Discharge: Pt denies barriers. Post acute provider list with quality measures, geographic area and applicable managed care information provided. Questions regarding selection process answered: sw offered [facility] list, pt declined, states he wants to stay with [facility] if insurance covers. Teach Back Method used with pt regarding care plan and discharge planning. Patient verbalized understanding of the plan of care and contribute to goal setting. Patient goals, treatment preferences and discharge plan: pt planning home with son providing 24/7 care x 2 weeks. Pt agreeable to [facility] for RN only and prefers [facility]  if insurance covers.     Electronically signed by TERENCE Alberts on 8/5/2021 at 9:52 AM       MAO called [facility] and left detailed message for this referral.

## 2021-08-05 NOTE — PROGRESS NOTES
[address] [facility] PHYSICAL THERAPY  DAILY NOTE  [facility] ICU STEPDOWN TELEMETRY 4K - 3F-06/387-M  Time In: 6437  Time Out: 1303  Timed Code Treatment Minutes: 23 Minutes  Minutes: 23          Date: 2021  Patient Name: Juan Sanchez,  Gender:  male        MRN: [id number]  : 1970  (46 y.o.)     Referring Practitioner: Claire Valenzuela MD  Diagnosis: CAD, multiple vessel  Additional Pertinent Hx: Pt with hx of stroke (L sided residual deficits) is s/p CABG x 3 (DOS: 8/3/21). Prior Level of Function:  Lives With: Son (son will be around most of the time to help out when needed)  Type of Home: House  Home Layout: One level  Home Access: Stairs to enter without rails  Entrance Stairs - Number of Steps: 1 + 2  Home Equipment: 4 wheeled walker, Cane, Quad cane (pt uses the cane before admission, just got the walker last week)   Bathroom Shower/Tub: Tub/Shower unit  Bathroom Toilet: Standard  Bathroom Equipment: Tub transfer bench    Receives Help From: Friend(s)  ADL Assistance: Independent  Homemaking Assistance: Independent  Homemaking Responsibilities: Yes  Ambulation Assistance: Needs assistance  Transfer Assistance: Independent  Active : Yes  Additional Comments: Pt reports he is dependent on cane at baseline due to hx of stroke with L-sided deficits    Restrictions/Precautions:  Restrictions/Precautions: Cardiac, Surgical Protocols, Fall Risk  Position Activity Restriction  Sternal Precautions: No Pushing, No Pulling, 10# Lifting Restrictions  Other position/activity restrictions: on 2L of O2 via NC     SUBJECTIVE: RN approved session. Pt in bed when therapy arrived and was agreeable to the treatment session. Pt stated he felt much better today than when we saw him yesterday and hadn't had any pain with his OT session.  Pt did very well with following his sternal precautions and did not use his hands in any of the transfers    PAIN: Pt did not complain of pain during the treatment Vitals: Heart Rate: 81bpm a couple of minutes after pt walked     OBJECTIVE:  Bed Mobility:  Supine to Sit: Stand By Assistance, X 1, with head of bed raised  Sit to Supine: Stand By Assistance, X 1, with head of bed raised     Transfers:  Sit to Stand: Contact Guard Assistance, X 1  Stand to [employer] Assistance    Ambulation:  Contact Guard Assistance, with verbal cues   Distance: 20'  Surface: Level Tile  Device:Rolling Walker  Gait Deviations:  Slow Justyna  *Pt cued to not to  walker when turning     Balance:  Static Sitting Balance:  Stand By Assistance  Dynamic Sitting Balance: Stand By Assistance  Static Standing Balance: Contact Guard Assistance  Dynamic Standing Balance: Contact Guard Assistance    Exercise:  Patient completed 10 reps of the Step II CABG exercise guidelines as well as 10 reps of long arc quads    Functional Outcome Measures: Completed  AM-PAC Inpatient Mobility Raw Score : 17  AM-PAC Inpatient T-Scale Score : 42.13    ASSESSMENT:  Assessment: Patient progressing toward established goals. Activity Tolerance:  Patient tolerance of  treatment: good. Pt did very well during the session, only complained of fatigue after ambulating and the exercises.  Response to exercise was within guidelines and pt did not have any adverse reactions     Equipment Recommendations:Equipment Needed: No  Discharge Recommendations:  Continue to assess pending progress, Patient would benefit from continued therapy after discharge    Plan: Times per week: 6 x CABG  Times per day: Daily  Current Treatment Recommendations: Strengthening, Home Exercise Program, Balance Training, Endurance Training, Safety Education & Training, Patient/Caregiver Education & Training, Functional Mobility Training, Transfer Training, Gait Training, Stair training    Patient Education  Patient Education: Plan of Care, Transfers, Gait, Verbal Exercise Instruction,  - Patient Verbalized Understanding    Goals:  Patient goals : return home  Short term goals  Time Frame for Short term goals: by hospital discharge  Short term goal 1: Supine to/from sit with modified independence, maintaining sternal precautions, for ease of transfers at discharge site. Short term goal 2: Sit to/from stand with modified independence, maintaining sternal precautions, for ease of transfers at discharge site. Short term goal 3: Ambulate 48' with LRD and modified independence for houshold distance ambulation. Short term goal 4: Ascend/descend 3 steps without HR with Tom x 1 for access to home. Short term goal 5: Comply with standing UE and LE exercies from CABG protocol to improve endurance and functional strength. Long term goals  Time Frame for Long term goals : N/A due to short ELOS. Following session, patient left in safe position with all fall risk precautions in place.

## 2021-08-05 NOTE — PLAN OF CARE
Problem: Falls - Risk of:  Goal: Will remain free from falls  Description: Will remain free from falls  8/5/2021 1957 by Anam Oliva RN  Outcome: Ongoing  Note: Patient remains free form falls this shift. Fall precautions in place. 8/5/2021 0645 by Valentino Andres RN  Outcome: Ongoing  Note: No falls this shift. Call light and possessions within reach, bed alarm on  Goal: Absence of physical injury  Description: Absence of physical injury  8/5/2021 1957 by Anam Oliva RN  Outcome: Ongoing  8/5/2021 0645 by Valentino Andres RN  Outcome: Ongoing     Problem: Cardiac Output - Decreased:  Goal: Hemodynamic stability will improve  Description: Hemodynamic stability will improve  8/5/2021 1957 by Anam Oliva RN  Outcome: Ongoing  8/5/2021 0645 by Valentino Andres RN  Outcome: Ongoing  Note:   Vitals:    08/04/21 2145 08/05/21 0021 08/05/21 0500 08/05/21 0545   BP: 129/76 120/62 114/64    Pulse: 77 77 79    Resp: 18 18 16    Temp: 98.4 °F (36.9 °C) 98.4 °F (36.9 °C) 97.4 °F (36.3 °C)    TempSrc: Oral Oral Oral    SpO2: 95% (!) 89% 92%    Weight:    172 lb 14.4 oz (78.4 kg)   Height:    5' 10\" (1.778 m)          Problem: Pain:  Goal: Pain level will decrease  Description: Pain level will decrease  8/5/2021 1957 by Anam Oliva RN  Outcome: Ongoing  Note: Pain Assessment: 0-10  Pain Level: 4   Pain goal:   no pain  Is pain goal met at this time? Yes     Additional interventions to be implemented: position change and rest    8/5/2021 0645 by Valentino Andres RN  Outcome: Ongoing  Note: Pain Assessment: 0-10  Pain Level: 6   Patient's Stated Pain Goal: No pain   Is pain goal met at this time?   No             Problem: Serum Glucose Level - Abnormal:  Goal: Ability to maintain appropriate glucose levels will improve to within specified parameters  Description: Ability to maintain appropriate glucose levels will improve to within specified parameters  8/5/2021 1957 by Anam Oliva RN  Outcome: Ongoing  8/5/2021 0645 by Virgilio Newberry RN  Outcome: Ongoing  Note: Chem ac/hs     Problem: Skin Integrity - Impaired:  Goal: Will show no infection signs and symptoms  Description: Will show no infection signs and symptoms  8/5/2021 1957 by Cherrie Mccoy RN  Outcome: Ongoing  Note: No new signs/symptoms of infection. Patient receiving CHG baths daily along with incision site care every shift. 8/5/2021 0645 by Virgilio Newberry RN  Outcome: Ongoing  Goal: Absence of new skin breakdown  Description: Absence of new skin breakdown  8/5/2021 1957 by Cherrie Mccoy RN  Outcome: Ongoing  8/5/2021 0645 by Virgilio Newberry RN  Outcome: Ongoing  Note: No new skin integrity issues noted     Problem: Nutrition  Goal: Optimal nutrition therapy  8/5/2021 1957 by Cherrie Mccoy RN  Outcome: Ongoing  8/5/2021 0645 by Virgilio Newberry RN  Outcome: Ongoing     Problem: Pain:  Goal: Pain level will decrease  Description: Pain level will decrease  8/5/2021 1957 by Cherrie Mccoy RN  Outcome: Ongoing  Note: Pain Assessment: 0-10  Pain Level: 4   Pain goal:   no pain  Is pain goal met at this time? Yes     Additional interventions to be implemented: position change and rest    8/5/2021 0645 by Virgilio Newberry RN  Outcome: Ongoing  Note: Pain Assessment: 0-10  Pain Level: 6   Patient's Stated Pain Goal: No pain   Is pain goal met at this time? No          Goal: Control of acute pain  Description: Control of acute pain  8/5/2021 1957 by Cherrie Mccoy RN  Outcome: Ongoing  8/5/2021 0645 by Virgilio Newberry RN  Outcome: Ongoing  Goal: Control of chronic pain  Description: Control of chronic pain  8/5/2021 1957 by Cherrie Mccoy RN  Outcome: Ongoing  8/5/2021 0645 by Virgilio Newberry RN  Outcome: Ongoing     Care plan reviewed with patient. Patient verbalize understanding of the plan of care and contribute to goal setting.

## 2021-08-05 NOTE — PLAN OF CARE
Problem: Falls - Risk of:  Goal: Will remain free from falls  Description: Will remain free from falls  Outcome: Ongoing  Note: No falls this shift. Call light and possessions within reach, bed alarm on  Goal: Absence of physical injury  Description: Absence of physical injury  Outcome: Ongoing     Problem: Cardiac Output - Decreased:  Goal: Hemodynamic stability will improve  Description: Hemodynamic stability will improve  Outcome: Ongoing  Note:   Vitals:    08/04/21 2145 08/05/21 0021 08/05/21 0500 08/05/21 0545   BP: 129/76 120/62 114/64    Pulse: 77 77 79    Resp: 18 18 16    Temp: 98.4 °F (36.9 °C) 98.4 °F (36.9 °C) 97.4 °F (36.3 °C)    TempSrc: Oral Oral Oral    SpO2: 95% (!) 89% 92%    Weight:    172 lb 14.4 oz (78.4 kg)   Height:    5' 10\" (1.778 m)          Problem: Pain:  Goal: Pain level will decrease  Description: Pain level will decrease  Outcome: Ongoing  Note: Pain Assessment: 0-10  Pain Level: 6   Patient's Stated Pain Goal: No pain   Is pain goal met at this time? No             Problem: Serum Glucose Level - Abnormal:  Goal: Ability to maintain appropriate glucose levels will improve to within specified parameters  Description: Ability to maintain appropriate glucose levels will improve to within specified parameters  Outcome: Ongoing  Note: Chem ac/hs     Problem: Skin Integrity - Impaired:  Goal: Will show no infection signs and symptoms  Description: Will show no infection signs and symptoms  Outcome: Ongoing  Goal: Absence of new skin breakdown  Description: Absence of new skin breakdown  Outcome: Ongoing  Note: No new skin integrity issues noted     Problem: Nutrition  Goal: Optimal nutrition therapy  Outcome: Ongoing     Problem: Pain:  Goal: Pain level will decrease  Description: Pain level will decrease  Outcome: Ongoing  Note: Pain Assessment: 0-10  Pain Level: 6   Patient's Stated Pain Goal: No pain   Is pain goal met at this time?   No          Goal: Control of acute pain  Description: Control of acute pain  Outcome: Ongoing  Goal: Control of chronic pain  Description: Control of chronic pain  Outcome: Ongoing   Care plan reviewed with patient. Patient verbalize understanding of the plan of care and contribute to goal setting.

## 2021-08-05 NOTE — CARE COORDINATION
8/5/21, 3:00 PM EDT    DISCHARGE ON [place] 89 day: 2  Location: -19/019-A Reason for admit: CAD, multiple vessel [I25.10]   Procedure:   8/3 3v CABG  8/3 Intubated - 8/3 extubated  8/5 CXR: Aeration of the lungs mildly improved in the interval    Barriers to Discharge: POD #2. Pacer wires out today. Afebrile. NSR. Sats 100% on 2L O2. Ox4. Follows commands. CT x4 to -20sx with 670 ml out in 24 hours. CR/PT/OT. Dietitian consulted. Dietary ileus prevention protocol. Telemetry, I&O, daily weight, IS, sternal precautions, CT care, wound care, SCDs, koehler care, ambulate. Asa, lipitor, lovenox, IV lasix 20 mg bid, SSI, prn IV zofran, prn roxicodone, protonix, electrolyte replacement protocols. Na+ 127, wbc 1.9, hgb 8.1, plt 115. PCP: RACHEL Larson CNP  Readmission Risk Score: 9%  Patient Goals/Plan/Treatment Preferences: Home with [facility] and [facility]. Has 4-wheeled walker and shower chair. Monitor for needs. SW on case.

## 2021-08-05 NOTE — PROGRESS NOTES
[facility] ICU STEPDOWN TELEMETRY 4K  Occupational Therapy  Daily Note  Time:   Time In:   Time Out: 831  Minutes: 39          Date: 2021  Patient Name: Aggie Vaughn,   Gender: male      Room: [facility]/019-A  MRN: [id number]  : 1970  (46 y.o.)  Referring Practitioner: Silvestre Dumont MD  Diagnosis: CAD, multiple vessel  Additional Pertinent Hx: 46year old male non-diabetic presents with worsening fatigue and syncopal episodes without chest pain. Murmur detected on physical exam prompted TTE & MARY, essentially showing low normal LVEF and mild AS (peak gradient 22). LHC shows severe multivessel CAD. Patient incidentally reports onset of left-sided weakness after COVID vaccination in February, which has improved. Previous heavy smoker, in process of quitting. Reports drinks approx 8 beers per day. Pt with hx of stroke (L sided residual deficits) is s/p CABG x 3 (DOS: 8/3/21). Restrictions/Precautions:  Restrictions/Precautions: Cardiac, Surgical Protocols, Fall Risk  Position Activity Restriction  Sternal Precautions: No Pushing, No Pulling, 10# Lifting Restrictions  Other position/activity restrictions: Chest tubes. Pt had CVA in 2021 effect L side. SUBJECTIVE: RN ok'ed Ot session. Pt was sitting in bedside chair upon arrival. Pt agreed to participate in OT session. Pt reported L  weakness d/t CVA that occurred in March. PAIN: 4/10: in back     Vitals: Blood Pressure: 138/78  Oxygen: 97%  at end of session NC on 2L of O2  Heart Rate: 82 bpm     COGNITION: Slow Processing, Decreased Insight, Decreased Problem Solving, Decreased Safety Awareness and Impaired Attention    ADL:   Lower Extremity Dressing: Minimal Assistance. to adjust socks . Grooming: CGA to stand at sink to comb hair     BALANCE:  Sitting Balance:  Stand By Assistance, X 1. pt sat at edge of chair to perform CABG exercise   Standing Balance: [address]  x1, increased time for safety. Stood at sink for x30 seconds to comb hair with unilateral release of UE, no LOB noted. BED MOBILITY:  Not Tested    TRANSFERS:  Sit to Stand:  Contact Guard Assistance, X 1, with increased time for completion, cues for hand placement, to/from chair with arms. min cuing for safety awareness and min cuing for maintaining sternal precautions   Stand to Sit: Contact Guard Assistance, X 1, with increased time for completion, cues for hand placement, to/from chair with arms. min cuing to maintain sternal precautions     FUNCTIONAL MOBILITY:  Assistive Device: Rolling Walker  Assist Level:  Contact Guard Assistance. Distance: To and from bathroom  Pt required x3 ~20 second rest breaks while standing d/t SOB. Pt req min vc's to maintain safety while using RW. ADDITIONAL ACTIVITIES:  Pt completed step II CABG exercises x6 reps x1 round to increase independence with self care tasks. Pt appeared SOB after completing exercises and stated that he felt weak. ASSESSMENT:     Activity Tolerance:  Patient tolerance of  treatment: good.        Discharge Recommendations: Continue to assess pending progress, IP Rehab, Patient would benefit from continued therapy after discharge   Equipment Recommendations: Equipment Needed: No  Plan: Times per week: 6x  Times per day: Daily  Current Treatment Recommendations: Strengthening, Positioning, Pain Management, Safety Education & Training, Balance Training, Self-Care / ADL, Patient/Caregiver Education & Training, Cognitive/Perceptual Training, Functional Mobility Training, Endurance Training    Patient Education  Patient Education: Role of OT, Plan of Care, ADL's, Energy Conservation, Home Exercise Program, Precautions, Reviewed Prior Education and Importance of Increasing Activity    Goals  Short term goals  Time Frame for Short term goals: by discharge  Short term goal 1: Pt will complete CABG step II exercises X10 reps to increase overall strength/endurance needed for ADL tasks. Short term goal 2: pt will complete function transfers CGA with min vc to follow sternal precautions and min vc's for safety  Short term goal 3: Pt will perform functional mobility SBA within [facility] distances to perform BADLs  Short term goal 4: Pt will perform LB dressing with min A with LHAE  prn to increase independence with self care. Following session, patient left in safe position with all fall risk precautions in place.

## 2021-08-05 NOTE — PROGRESS NOTES
Pharmacy Medication History Note      List of current medications patient is taking is complete. Source of information: surescripts, patient    Changes made to medication list:  Medications removed (include reason, ex. therapy complete or physician discontinued):  Varenicline - finished about 2 months ago    Medications added/doses adjusted:  Adjusted lisinopril 40 mg to BID   Adjusted polyethylene glycol to daily prn     Other notes (ex. Recent course of antibiotics, Coumadin dosing):  Denies use of other OTC or herbal medications. Patient states his provider instructed him to increase lisinopril to 40 mg BID since he did not tolerate carvedilol, however this dose is above the max recommended dose of lisinopril.        Allergies reviewed      Electronically signed by Mayito Guzman, [address] [place] [address] on 8/5/2021 at 5:05 PM

## 2021-08-06 ENCOUNTER — APPOINTMENT (OUTPATIENT)
Dept: GENERAL RADIOLOGY | Age: 51
DRG: 234 | End: 2021-08-06
Attending: THORACIC SURGERY (CARDIOTHORACIC VASCULAR SURGERY)
Payer: COMMERCIAL

## 2021-08-06 LAB
ANION GAP SERPL CALCULATED.3IONS-SCNC: 10 MEQ/L (ref 8–16)
BUN BLDV-MCNC: 16 MG/DL (ref 7–22)
CALCIUM SERPL-MCNC: 8.8 MG/DL (ref 8.5–10.5)
CHLORIDE BLD-SCNC: 92 MEQ/L (ref 98–111)
CO2: 25 MEQ/L (ref 23–33)
CREAT SERPL-MCNC: 0.7 MG/DL (ref 0.4–1.2)
ERYTHROCYTE [DISTWIDTH] IN BLOOD BY AUTOMATED COUNT: 14.6 % (ref 11.5–14.5)
ERYTHROCYTE [DISTWIDTH] IN BLOOD BY AUTOMATED COUNT: 51.9 FL (ref 35–45)
GFR SERPL CREATININE-BSD FRML MDRD: > 90 ML/MIN/1.73M2
GLUCOSE BLD-MCNC: 143 MG/DL (ref 70–108)
HCT VFR BLD CALC: 23.2 % (ref 42–52)
HEMOGLOBIN: 7.9 GM/DL (ref 14–18)
MCH RBC QN AUTO: 33.2 PG (ref 26–33)
MCHC RBC AUTO-ENTMCNC: 34.1 GM/DL (ref 32.2–35.5)
MCV RBC AUTO: 97.5 FL (ref 80–94)
PLATELET # BLD: 107 THOU/MM3 (ref 130–400)
PMV BLD AUTO: 9.6 FL (ref 9.4–12.4)
POTASSIUM SERPL-SCNC: 4 MEQ/L (ref 3.5–5.2)
RBC # BLD: 2.38 MILL/MM3 (ref 4.7–6.1)
SODIUM BLD-SCNC: 127 MEQ/L (ref 135–145)
WBC # BLD: 3.2 THOU/MM3 (ref 4.8–10.8)

## 2021-08-06 PROCEDURE — 2580000003 HC RX 258: Performed by: THORACIC SURGERY (CARDIOTHORACIC VASCULAR SURGERY)

## 2021-08-06 PROCEDURE — APPSS30 APP SPLIT SHARED TIME 16-30 MINUTES: Performed by: PHYSICIAN ASSISTANT

## 2021-08-06 PROCEDURE — 36415 COLL VENOUS BLD VENIPUNCTURE: CPT

## 2021-08-06 PROCEDURE — 6370000000 HC RX 637 (ALT 250 FOR IP): Performed by: THORACIC SURGERY (CARDIOTHORACIC VASCULAR SURGERY)

## 2021-08-06 PROCEDURE — 71045 X-RAY EXAM CHEST 1 VIEW: CPT

## 2021-08-06 PROCEDURE — 2060000000 HC ICU INTERMEDIATE R&B

## 2021-08-06 PROCEDURE — 80048 BASIC METABOLIC PNL TOTAL CA: CPT

## 2021-08-06 PROCEDURE — 97530 THERAPEUTIC ACTIVITIES: CPT

## 2021-08-06 PROCEDURE — 6360000002 HC RX W HCPCS: Performed by: THORACIC SURGERY (CARDIOTHORACIC VASCULAR SURGERY)

## 2021-08-06 PROCEDURE — 97116 GAIT TRAINING THERAPY: CPT

## 2021-08-06 PROCEDURE — 85027 COMPLETE CBC AUTOMATED: CPT

## 2021-08-06 PROCEDURE — 97535 SELF CARE MNGMENT TRAINING: CPT

## 2021-08-06 PROCEDURE — 97110 THERAPEUTIC EXERCISES: CPT

## 2021-08-06 RX ADMIN — BISACODYL 10 MG: 5 TABLET, COATED ORAL at 09:06

## 2021-08-06 RX ADMIN — METOPROLOL TARTRATE 25 MG: 25 TABLET, FILM COATED ORAL at 09:07

## 2021-08-06 RX ADMIN — PANTOPRAZOLE SODIUM 40 MG: 40 TABLET, DELAYED RELEASE ORAL at 06:02

## 2021-08-06 RX ADMIN — FUROSEMIDE 20 MG: 10 INJECTION, SOLUTION INTRAMUSCULAR; INTRAVENOUS at 09:10

## 2021-08-06 RX ADMIN — Medication 17.6 MG: at 09:09

## 2021-08-06 RX ADMIN — SODIUM CHLORIDE, PRESERVATIVE FREE 10 ML: 5 INJECTION INTRAVENOUS at 09:14

## 2021-08-06 RX ADMIN — SODIUM CHLORIDE, PRESERVATIVE FREE 10 ML: 5 INJECTION INTRAVENOUS at 20:02

## 2021-08-06 RX ADMIN — ASPIRIN 325 MG: 325 TABLET, COATED ORAL at 09:07

## 2021-08-06 RX ADMIN — Medication 1 TABLET: at 09:06

## 2021-08-06 RX ADMIN — FUROSEMIDE 20 MG: 10 INJECTION, SOLUTION INTRAMUSCULAR; INTRAVENOUS at 17:46

## 2021-08-06 RX ADMIN — ENOXAPARIN SODIUM 40 MG: 40 INJECTION SUBCUTANEOUS at 09:14

## 2021-08-06 RX ADMIN — METOPROLOL TARTRATE 25 MG: 25 TABLET, FILM COATED ORAL at 20:02

## 2021-08-06 RX ADMIN — MAGNESIUM HYDROXIDE 30 ML: 400 SUSPENSION ORAL at 09:06

## 2021-08-06 RX ADMIN — ATORVASTATIN CALCIUM 20 MG: 20 TABLET, FILM COATED ORAL at 20:02

## 2021-08-06 ASSESSMENT — PAIN DESCRIPTION - ONSET: ONSET: ON-GOING

## 2021-08-06 ASSESSMENT — PAIN DESCRIPTION - ORIENTATION: ORIENTATION: LOWER

## 2021-08-06 ASSESSMENT — PAIN SCALES - GENERAL
PAINLEVEL_OUTOF10: 0
PAINLEVEL_OUTOF10: 5

## 2021-08-06 ASSESSMENT — PAIN DESCRIPTION - DESCRIPTORS: DESCRIPTORS: CONSTANT;SHARP

## 2021-08-06 ASSESSMENT — PAIN DESCRIPTION - FREQUENCY: FREQUENCY: INTERMITTENT

## 2021-08-06 ASSESSMENT — PAIN DESCRIPTION - LOCATION: LOCATION: BACK

## 2021-08-06 ASSESSMENT — PAIN DESCRIPTION - PAIN TYPE: TYPE: ACUTE PAIN

## 2021-08-06 NOTE — PLAN OF CARE
Problem: Falls - Risk of:  Goal: Will remain free from falls  Description: Will remain free from falls  8/6/2021 1425 by Sanjeev Marcos RN  Outcome: Ongoing  Note: No falls noted this shift. Fall risk assessment completed. Hourly rounding performed. Bed locked in lowest position, bed alarm on, call light and personal items within reach, and fall sign posted. Indwelling urinary koehler catheter in place. Plan to leave koehler catheter in place per cardiology. Problem: Cardiac Output - Decreased:  Goal: Hemodynamic stability will improve  Description: Hemodynamic stability will improve  Outcome: Ongoing  Note:   Vitals:    08/06/21 0500 08/06/21 0857 08/06/21 1128 08/06/21 1422   BP: (!) 157/79 127/70 130/81    Pulse: 78 74 73    Resp: 18 18 18    Temp: 98.6 °F (37 °C) 98.6 °F (37 °C) 97.8 °F (36.6 °C)    TempSrc: Oral Oral Oral    SpO2: 98% 96% 94% 98%   Weight: 164 lb 11.2 oz (74.7 kg)      Height:             Problem: Pain:  Goal: Pain level will decrease  Description: Pain level will decrease  8/6/2021 1425 by Sanjeev Marcos RN  Outcome: Ongoing  Note: Pain Assessment: 0-10  Pain Level: 5   Patient's Stated Pain Goal: No pain   Is pain goal met at this time? No     Non-Pharmaceutical Pain Intervention(s): Rest, Repositioned     Problem: Serum Glucose Level - Abnormal:  Goal: Ability to maintain appropriate glucose levels will improve to within specified parameters  Description: Ability to maintain appropriate glucose levels will improve to within specified parameters  Outcome: Ongoing  Note: Patient refusing blood glucose checks. Problem: Skin Integrity - Impaired:  Goal: Will show no infection signs and symptoms  Description: Will show no infection signs and symptoms  8/6/2021 1425 by Sanjeev Marcos RN  Outcome: Ongoing  Note: No new skin lesions noted this shift. Patient encouraged to reposition every two hours. Patient repositions self while in bed. Skin assessments completed and ongoing. Problem: Nutrition  Goal: Optimal nutrition therapy  8/6/2021 1425 by Jeramy Randle RN  Outcome: Ongoing  Note: On regular carb controlled diet and tolerating well. Denies any nausea or emesis. Problem: Pain:  Goal: Pain level will decrease  Description: Pain level will decrease  8/6/2021 1425 by Jeramy Randle RN  Outcome: Ongoing  Note: Pain Assessment: 0-10  Pain Level: 5   Patient's Stated Pain Goal: No pain   Is pain goal met at this time? No     Non-Pharmaceutical Pain Intervention(s): Rest, Repositioned     Problem: Discharge Planning:  Goal: Participates in care planning  Description: Participates in care planning  8/6/2021 1425 by Jeramy Randle RN  Outcome: Ongoing  Note: Patient from home with son. Patient plans to return home with son and HH at discharge. No plans for discharge at this time. Problem: Gas Exchange - Impaired:  Goal: Levels of oxygenation will improve  Description: Levels of oxygenation will improve  8/6/2021 1425 by Jeramy Randle RN  Outcome: Ongoing  Note: Lung sounds are clear and diminished with pleural friction rub. Denies any SOB. Chest tubes in place at this time -20 suction. No airleak noted. O2 saturation remains greater than 90 % on room air. Care plan reviewed with patient. Patient verbalize understanding of the plan of care and contribute to goal setting.     Electronically signed by Jeramy Randle RN on 8/6/2021 at 2:36 PM

## 2021-08-06 NOTE — PLAN OF CARE
Problem: Pain:  Goal: Pain level will decrease  Description: Pain level will decrease  8/6/2021 0350 by Jim Clayton RN  Outcome: Met This Shift  Note: No pain this shift  8/6/2021 0349 by Jim Clayton RN  Outcome: Met This Shift  Note: Pt in no pain this shift    8/5/2021 1957 by Paulette Eric RN  Outcome: Ongoing  Note: Pain Assessment: 0-10  Pain Level: 4   Pain goal:   no pain  Is pain goal met at this time? Yes     Additional interventions to be implemented: position change and rest       Problem: Pain:  Goal: Pain level will decrease  Description: Pain level will decrease  8/6/2021 0350 by Jim Clayton RN  Outcome: Met This Shift  Note: No pain this shift  8/6/2021 0349 by Jim Clayton RN  Outcome: Met This Shift  Note: Pt in no pain this shift    8/5/2021 1957 by Paulette Eric RN  Outcome: Ongoing  Note: Pain Assessment: 0-10  Pain Level: 4   Pain goal:   no pain  Is pain goal met at this time? Yes     Additional interventions to be implemented: position change and rest       Problem: Falls - Risk of:  Goal: Will remain free from falls  Description: Will remain free from falls  8/6/2021 0350 by Jim Clayton RN  Outcome: Ongoing  Note: Call light within reach. Side rails up x2. Bed alarm on. Non skid slippers available. 8/6/2021 0349 by Jim Clayton RN  Outcome: Ongoing  Note: Call light within reach. Side rails up x2. Bed alarm on. Non skid slippers available. 8/5/2021 1957 by Paulette Eric RN  Outcome: Ongoing  Note: Patient remains free form falls this shift. Fall precautions in place.      Problem: Falls - Risk of:  Goal: Absence of physical injury  Description: Absence of physical injury  8/6/2021 0350 by Jim Clayton RN  Outcome: Ongoing  8/6/2021 0349 by Jim Clayton RN  Outcome: Ongoing  8/5/2021 1957 by Paulette Eric RN  Outcome: Ongoing     Problem: Skin Integrity - Impaired:  Goal: Will show no infection signs and symptoms  Description: Will show no infection signs and symptoms  8/6/2021 0350 by Cheko Hui RN  Outcome: Ongoing  Note: Pt has had no new skin breakdown this shift. Pt is assisted with turning every two hours while in bed and is educated on the importance of relieving pressure to prevent skin breakdown. 8/5/2021 1957 by Indio Lamb RN  Outcome: Ongoing  Note: No new signs/symptoms of infection. Patient receiving CHG baths daily along with incision site care every shift. Problem: Skin Integrity - Impaired:  Goal: Absence of new skin breakdown  Description: Absence of new skin breakdown  8/6/2021 0350 by Cheko Hui RN  Outcome: Ongoing  8/5/2021 1957 by Indio Lamb RN  Outcome: Ongoing     Problem: Nutrition  Goal: Optimal nutrition therapy  8/6/2021 0350 by Cheko Hui RN  Outcome: Ongoing  Note: Patient not eating much previous shift  8/5/2021 1957 by Indio Lamb RN  Outcome: Ongoing     Problem: Discharge Planning:  Goal: Participates in care planning  Description: Participates in care planning  Outcome: Ongoing     Problem: Discharge Planning:  Goal: Discharged to appropriate level of care  Description: Discharged to appropriate level of care  Outcome: Ongoing  Note: To return home with son upon D/C     Problem: Gas Exchange - Impaired:  Goal: Levels of oxygenation will improve  Description: Levels of oxygenation will improve  Outcome: Ongoing  Note: Pt desatted overnight to low 80's-corrected with 3 L NC   Careplan reviewed with patient. Patient verbalizes understanding.

## 2021-08-06 NOTE — PROGRESS NOTES
Inpatient Cardiac Rehabilitation Consult    Received consult for Phase II Cardiac Rehabilitation. Patient needs cardiac rehab due to CABG on 8-3-21. Importance of Cardiac Rehab discussed with patient. Reviewed cardiac rehab class times. Patient questions answered although patient had a hat on his head covering his eyes the whole time was talking with patient and patient was pert in his responses. We will contact patient at home to schedule evaluation appointment. Cardiac Rehab brochure given.

## 2021-08-06 NOTE — PROGRESS NOTES
[facility] PHYSICAL THERAPY  DAILY NOTE  [facility] ICU STEPDOWN TELEMETRY 4K - 2U-64/048-M      Time In: 7192  Time Out: 1317  Timed Code Treatment Minutes: 28 Minutes  Minutes: 28          Date: 2021  Patient Name: Joe Lemus,  Gender:  male        MRN: [id number]  : 1970  (46 y.o.)     Referring Practitioner: Jessica Bernal MD  Diagnosis: CAD, multiple vessel  Additional Pertinent Hx: Pt with hx of stroke (L sided residual deficits) is s/p CABG x 3 (DOS: 8/3/21). Prior Level of Function:  Lives With: Son (son will be around most of the time to help out when needed)  Type of Home: House  Home Layout: One level  Home Access: Stairs to enter without rails  Entrance Stairs - Number of Steps: 1 + 2  Home Equipment: 4 wheeled walker, Cane, Quad cane (pt uses the cane before admission, just got the walker last week)   Bathroom Shower/Tub: Tub/Shower unit  Bathroom Toilet: Standard  Bathroom Equipment: Tub transfer bench    Receives Help From: Friend(s)  ADL Assistance: Independent  Homemaking Assistance: Independent  Homemaking Responsibilities: Yes  Ambulation Assistance: Needs assistance  Transfer Assistance: Independent  Active : Yes  Additional Comments: Pt reports he is dependent on cane at baseline due to hx of stroke with L-sided deficits    Restrictions/Precautions:  Restrictions/Precautions: Cardiac, Surgical Protocols, Fall Risk  Position Activity Restriction  Sternal Precautions: No Pushing, No Pulling, 10# Lifting Restrictions  Other position/activity restrictions: Chest tubes. Pt had CVA in 2021 effect L side.        SUBJECTIVE: nursing ok'd therapy reported that pt needs to get up to chair- pt in bed on arrival he was agreeable to get up to walk, however when it came to sitting up following session he needed much encouragement and education on why the MD wants up him vs in bed, pt did have O2 on at arrival however nurse reported that she had taken it off earlier and ok to walk w/o O2 did monitor his sat and he was > 95% so kept off even after walk    PAIN: I didn't perseverate on a number- his main c/o was back pain as he reported he is needing back sx     Vitals: Oxygen: on arrival he was on 1L and at % nursing recommended to remove the O2 and he was > 95% throughout session    Heart Rate: 70's -80's with activity     OBJECTIVE:  Bed Mobility:  Supine to Sit: Minimal Assistance, HOB elevated all the way up (pt reported that he will use his lift chair to sleep at home)- he required max cues for sternal precautions as he was pushing through his elbow > 10# educated on why we maintain sternal precautions       Transfers:  Sit to Stand: Contact Guard Assistance  Stand to Sit:Contact Guard Assistance    Ambulation:  Contact Guard Assistance  Distance: 80x1  Surface: Level Tile  Device:4 Wheeled Walker  Gait Deviations:  Fair posture and step length, he did require cues for staying in the walker when turning around- he took a standing rest break 1/2 way through the walk - did educate on how to lock and unlock breaks of rollator       Exercise:  None this session - I attempted some activity education following open heart sx   Functional Outcome Measures: Completed  AM-PAC Inpatient Mobility Raw Score : 17  AM-PAC Inpatient T-Scale Score : 42.13    ASSESSMENT:  Assessment: pt required cues for sternal precautions and activity following open heart sx for optimal recovery - he cont to require education and assist with mobility and transfers   Activity Tolerance:  Patient tolerance of  treatment: fair.         Equipment Recommendations:Equipment Needed: No  Discharge Recommendations:    24 hour supervision or assist, Home with Home health PT    Plan: Times per week: 6 x CABG  Times per day: Daily  Current Treatment Recommendations: Strengthening, Home Exercise Program, Balance Training, Endurance Training, Safety Education & Training, Patient/Caregiver Education & Training, Functional Mobility Training, Transfer Training, Gait Training, Stair training    Patient Education  Patient Education: Plan of Care, sternal precautions     Goals:  Patient goals : return home  Short term goals  Time Frame for Short term goals: by hospital discharge  Short term goal 1: Supine to/from sit with modified independence, maintaining sternal precautions, for ease of transfers at discharge site. Short term goal 2: Sit to/from stand with modified independence, maintaining sternal precautions, for ease of transfers at discharge site. Short term goal 3: Ambulate 48' with LRD and modified independence for houshold distance ambulation. Short term goal 4: Ascend/descend 3 steps without HR with Tom x 1 for access to home. Short term goal 5: Comply with standing UE and LE exercies from CABG protocol to improve endurance and functional strength. Long term goals  Time Frame for Long term goals : N/A due to short ELOS. Following session, patient left in safe position with all fall risk precautions in place.

## 2021-08-06 NOTE — PROGRESS NOTES
[facility] ICU STEPDOWN TELEMETRY 4K  Occupational Therapy  Daily Note  Time:  Time In: 7793  Time Out: 3090  Minutes: 27          Date: 2021  Patient Name: Jamshid Schulte,   Gender: male      Room: -19/019-A  MRN: [id number]  : 1970  (46 y.o.)  Referring Practitioner: Shane Plascencia MD  Diagnosis: CAD, multiple vessel  Additional Pertinent Hx: 46year old male non-diabetic presents with worsening fatigue and syncopal episodes without chest pain. Murmur detected on physical exam prompted TTE & AMRY, essentially showing low normal LVEF and mild AS (peak gradient 22). LHC shows severe multivessel CAD. Patient incidentally reports onset of left-sided weakness after COVID vaccination in February, which has improved. Previous heavy smoker, in process of quitting. Reports drinks approx 8 beers per day. Pt with hx of stroke (L sided residual deficits) is s/p CABG x 3 (DOS: 8/3/21). Restrictions/Precautions:  Restrictions/Precautions: Cardiac, Surgical Protocols, Fall Risk  Position Activity Restriction  Sternal Precautions: No Pushing, No Pulling, 10# Lifting Restrictions  Other position/activity restrictions: Chest tubes. Pt had CVA in 2021 effect L side. Pt on 1/2 L O2 NC     SUBJECTIVE: RN ok'ed OT session. Pt sitting in bedside chair on arrival and initially denied treamt. Pt req max encouragement to participate in OT session. OT educated pt that increasing activity will help make him feel better and get him more prepared for being discharge from hospital. Pt agreed to participate in OT session but had flat affect and was very quiet throughout session. Pt reported that he is frustrated and wants to go home. PAIN: 3/10: abdomen. 8/10 in back stating it was due to sitting in bedside chair.      Vitals: Heart Rate: 75bpm after completing CABG exercises and functional mobility task              Oxygen: 98% on 1/2 L O2 NC - after completing CABG exercises and functional mobility task    COGNITION: Slow Processing, Decreased Recall, Decreased Insight, Decreased Safety Awareness, Decreased Arousal and Difficulty Following Commands    ADL:   Grooming: Independent. donned/doffed stocking hat (d/t reporting having a headache and wanted to block out lights once returning to bedside chair). Functional mobility to  front of sink in prep for grooming tasks. Denied completing grooming task once to sink, stating \"I just want to go back to my chair, I do not feel like doing this anymore\". BALANCE:  Sitting Balance:  Independent:  edge of bedside chair to complete CABG step 2 exercises   Standing Balance: Stand By Assistance. using RW for stability     BED MOBILITY:  Not Tested    TRANSFERS:  Sit to Stand:  Stand By Assistance. Required mod vc's to maintain sternal precautions   Stand to Sit: Stand By Assistance. Required mod vc's to maintain sternal precautions     FUNCTIONAL MOBILITY:  Assistive Device: Rolling Walker  Assist Level:  Stand By Assistance X1, req verbal cues to not  RW. Distance: To and from bathroom       ADDITIONAL ACTIVITIES:  Completed step 2 CABG exercises x10 reps x1/set in prep for completing ADLs. ASSESSMENT:     Activity Tolerance:  Patient tolerance of  treatment: poor. D/t not wanting to participate in OT session.       Discharge Recommendations: Continue to assess pending progress, 24 hour supervision or assist, Home with Home health OT   Equipment Recommendations: Equipment Needed: No  Plan: Times per week: 6x  Times per day: Daily  Current Treatment Recommendations: Strengthening, Positioning, Pain Management, Safety Education & Training, Balance Training, Self-Care / ADL, Patient/Caregiver Education & Training, Cognitive/Perceptual Training, Functional Mobility Training, Endurance Training    Patient Education  Patient Education: Role of OT, Plan of Care, ADL's, Home Exercise Program, Precautions, Reviewed Prior Education, Importance of Increasing Activity and Assistive Device Safety    Goals  Short term goals  Time Frame for Short term goals: by discharge  Short term goal 1: Pt will complete CABG step II exercises X10 reps to increase overall strength/endurance needed for ADL tasks. Short term goal 2: pt will complete function transfers CGA with min vc to follow sternal precautions and min vc's for safety  Short term goal 3: Pt will perform functional mobility SBA within [facility] distances to perform BADLs  Short term goal 4: Pt will perform LB dressing with min A with LHAE  prn to increase independence with self care. Following session, patient left in safe position with all fall risk precautions in place.

## 2021-08-06 NOTE — PROGRESS NOTES
Comprehensive Nutrition Assessment    Type and Reason for Visit:  Reassess    Nutrition Recommendations/Plan:   MD to advise if diet can be liberalized if po intake doesn't t improve. No BM yet, passing gas per pt. Notice Na 127. Send Glucerna TID. Continue MVI. Nutrition Assessment:    Pt.not improving nutritionally AEB intake continues to be inadequate/ No BM since OHS (8/3)  At risk for further nutrition compromise r/t admit with CAD, s/p OHS 8/3/21, nausea, increased nutrient needs to support post-op wound healing, alcohol abuse hx,  and underlying medical condition (hx: HTN, CAD, CVA, acid reflux, smoking, alcohol abuse). Nutrition recommendations/interventions as per above  Malnutrition Assessment:  Malnutrition Status: At risk for malnutrition (Comment)    Context:  Chronic Illness     Findings of the 6 clinical characteristics of malnutrition:  Energy Intake:  7 - 75% or less estimated energy requirements for 1 month or longer  Weight Loss:  No significant weight loss     Body Fat Loss:  No significant body fat loss     Muscle Mass Loss:  No significant muscle mass loss    Fluid Accumulation:  Unable to assess (diuresing post-OHS)     Strength:  Not Performed    Estimated Daily Nutrient Needs:  Energy (kcal):  3982-8739 kcals (25-30 kcals/kg/day); Weight Used for Energy Requirements:   (77 kg)     Protein (g):   grams (1.2-2 grams/kg/day); Weight Used for Protein Requirements:   (77 kg)        Fluid (ml/day):   per MD;       Nutrition Related Findings:  Pt seen, mentions he consumed rest of scrambled eggs at lunch today. Fair appetite. Appears unhappy with diet restrictions. Hx of alcohol abuse , 8 beers/day noted. Pt s/p OHS (8/3/21). No BM \" Passing gas\" per pt. Pt dislikes ensure enlive \" tastes like chalk\". He reports tired of [place]. Intake poor overall. Labs: (8/6) Na 127, Glucose 143, Hemoglobin 7.9.       Wounds:  Surgical Incision (s/p OHS 8/3/21, sternum and right leg incision)       Current Nutrition Therapies:    ADULT DIET; Regular; 5 carb choices (75 gm/meal); Low Sodium (2 gm)  Adult Oral Nutrition Supplement; Diabetic Oral Supplement    Anthropometric Measures:  · Height: 5' 10\" (177.8 cm)  · Current Body Weight: 164 lb 11.2 oz (74.7 kg) (no edema)   · Admission Body Weight: 160 lb 3.2 oz (72.7 kg) (8/3/21 no edema)    · Usual Body Weight:  (Per pt 155-160#. Per EMR: 3/25/21: 178#, 7/23/21: 158#15oz)     · Ideal Body Weight: 166 lbs;     · BMI: 23.6  · Adjusted Body Weight:  ; No Adjustment   ·     · BMI Categories: Normal Weight (BMI 22.0 to 24.9) age over 72       Nutrition Diagnosis:   · Increased nutrient needs related to increase demand for energy/nutrients as evidenced by wounds      Nutrition Interventions:   Food and/or Nutrient Delivery:  Modify Oral Nutrition Supplement (MD to advise if diet can be liberlaized, poor po, Na is 127)  Nutrition Education/Counseling:  Education completed (Cardiac diet reinforced, handout given as well 8/4/21. Encouraged oral intake and ONS use.)   Coordination of Nutrition Care:  Continue to monitor while inpatient    Goals:  Patient will consume 75% or more of meals during LOS. Nutrition Monitoring and Evaluation:   Behavioral-Environmental Outcomes:  None Identified   Food/Nutrient Intake Outcomes:  Food and Nutrient Intake, Supplement Intake, Vitamin/Mineral Intake  Physical Signs/Symptoms Outcomes:  GI Status, Fluid Status or Edema, Meal Time Behavior, Biochemical Data, Nutrition Focused Physical Findings, Skin, Weight     Discharge Planning:     Too soon to determine     Electronically signed by Lencho Templeton RD, LD on 8/6/21 at 2:59 PM EDT    Contact: [phone]

## 2021-08-06 NOTE — PROGRESS NOTES
CT/CV Surgery Progress Note    2021 8:31 AM  Surgeon:  Dr. Dacia Campoverde:  Mr. Natalia Hernandez is POD #3 S/P CABGx3. He resting comfortably in bed on 0.5L NC, O2 sat 96%. Pt is alert, and in no acute distress. Pt denies chest pressure, SOB, fever,chills, N/V/D. Chest tube output: Total past 24 hours 612cc  (Mediastinal 171cc past shift, 323cc past 24 hrs; Pleural 160cc past shift, 389cc past 24 hours)     Vital Signs: BP (!) 157/79   Pulse 78   Temp 98.6 °F (37 °C) (Oral)   Resp 18   Ht 5' 10\" (1.778 m)   Wt 164 lb 11.2 oz (74.7 kg)   SpO2 98%   BMI 23.63 kg/m²    Temp (24hrs), Av.5 °F (36.9 °C), Min:98.3 °F (36.8 °C), Max:98.7 °F (37.1 °C)      PULSE OXIMETRY RANGE: SpO2  Av.8 %  Min: 86 %  Max: 100 %     Labs:   CBC:     Recent Labs     21  0410 21  0530 21  0450   WBC 4.2* 1.9* 3.2*   HGB 7.4* 8.1* 7.9*   HCT 21.9* 24.8* 23.2*   MCV 99.1* 101.2* 97.5*   PLT 99* 115* 107*     BMP:   Recent Labs     21  1305 21  1323 21  0410 21  0519 21  0530 21  0755 21  0450   *   < > 131*  --  127*  --   --  127*   K 3.6   < > 4.6  --  4.5  --   --  4.0   CL 94*   < > 99  --  92*  --   --  92*   CO2 23   < > 23  --  24  --   --  25   BUN 6*   < > 6*  --  12  --   --  16   CREATININE 0.7   < > 0.7  --  0.9  --   --  0.7   MG 3.3*  --  2.5*  --   --   --   --   --    POCGLU  --    < >  --    < >  --    < > 179*  --     < > = values in this interval not displayed. Last HgA1C:   Lab Results   Component Value Date    LABA1C 5.9 2021       Imaging:  CXR: I have reviewed the CXR image. Atelectasis both lower lungs. Similar to prior exam.   Cardiomegaly. Bilateral chest tubes. Mediastinal drain. No change. No acute fracture.          Intake/Output Summary (Last 24 hours) at 2021 0831  Last data filed at 2021 0500  Gross per 24 hour   Intake 800 ml   Output 2437 ml   Net -1637 ml       Scheduled Meds:    metoprolol tartrate  25 mg Oral BID    pantoprazole  40 mg Oral QAM AC    furosemide  20 mg Intravenous BID    potassium (CARDIAC) replacement protocol   Other RX Placeholder    sodium chloride flush  10 mL Intravenous 2 times per day    enoxaparin  40 mg Subcutaneous Daily    aspirin  325 mg Oral Daily    multivitamin  1 tablet Oral Daily with breakfast    bisacodyl  10 mg Oral BID    atorvastatin  20 mg Oral Nightly       ROS: All neg unless specifically mentioned in subjective section. Exam:  General Appearance: alert ,conversing, in no acute distress  Cardiovascular: normal rate, regular rhythm, normal S1 and S2, no murmurs, rubs, clicks, or gallops  Pulmonary/Chest: clear to auscultation bilaterally- no wheezes, rales or rhonchi, normal air movement, no respiratory distress  Neurological: alert, oriented, normal speech, no focal findings or movement disorder noted  Sternum: Incision healing appropriately and no wound dehiscence noted. Assessment:   Patient Active Problem List   Diagnosis    Ac cerebral infarction w/ ischemia ([address]    Hypertension    Tobacco abuse    Weakness of left lower extremity    Stroke ([address]    Multiple vessel coronary artery disease    CAD, multiple vessel       Plan:   1. CXR reviewed- Continue daily CXR's   2. Encourage incentive spirometer and ambulation  3.  Continue current care    The plan of care was discussed in detail with ANURADHA Arreguin

## 2021-08-06 NOTE — PROGRESS NOTES
Patient not properly using sternal precautions after being told. Walked in to patient desatting and oxygen not properly in nose. Patient satting fine on oxygen-will continue to monitor. Patient is verbally aggressive this am-was not like this earlier in shift.

## 2021-08-06 NOTE — CARE COORDINATION
8/6/21, 2:32 PM EDT    DISCHARGE ON GOING [facility] day: 3  Location: -19/019-A Reason for admit: CAD, multiple vessel [I25.10]   Procedure:   8/3 3v CABG  8/3 Intubated - 8/3 extubated  8/6 CXR: Trace biapical pneumothoraces unchanged, approximately 3 mm each    Barriers to Discharge: POD #3. No BM yet - PRN meds given today. Poor appetitie. Walked [de-identified]' with RW, CGA today with PT. Afebrile. NSR. On room air. Ox4. Follows commands. CT x4 to -20sx with 612 ml out in 24 hours. CR/PT/OT. Dietitian consulted. Dietary ileus prevention protocol. Telemetry, I&O, daily weight, IS, sternal precautions, CT care, wound care, SCDs, koehler care, ambulate. Asa, lipitor, lovenox, IV lasix 20 mg bid, lopressor, prn IV zofran, prn roxicodone, protonix, electrolyte replacement protocols. Na+ 127, wbc 3.2, hgb 7.9, plt 107. PCP: Candi Solis, RACHEL - CNP  Readmission Risk Score: 9%  Patient Goals/Plan/Treatment Preferences: Home with son and [facility]. Has 4-wheeled walker and shower chair. Monitor for needs. SW on case.

## 2021-08-07 ENCOUNTER — APPOINTMENT (OUTPATIENT)
Dept: GENERAL RADIOLOGY | Age: 51
DRG: 234 | End: 2021-08-07
Attending: THORACIC SURGERY (CARDIOTHORACIC VASCULAR SURGERY)
Payer: COMMERCIAL

## 2021-08-07 LAB
ANION GAP SERPL CALCULATED.3IONS-SCNC: 9 MEQ/L (ref 8–16)
BUN BLDV-MCNC: 16 MG/DL (ref 7–22)
CALCIUM SERPL-MCNC: 9.1 MG/DL (ref 8.5–10.5)
CHLORIDE BLD-SCNC: 90 MEQ/L (ref 98–111)
CO2: 28 MEQ/L (ref 23–33)
CREAT SERPL-MCNC: 0.8 MG/DL (ref 0.4–1.2)
ERYTHROCYTE [DISTWIDTH] IN BLOOD BY AUTOMATED COUNT: 14.7 % (ref 11.5–14.5)
ERYTHROCYTE [DISTWIDTH] IN BLOOD BY AUTOMATED COUNT: 52 FL (ref 35–45)
GFR SERPL CREATININE-BSD FRML MDRD: > 90 ML/MIN/1.73M2
GLUCOSE BLD-MCNC: 129 MG/DL (ref 70–108)
HCT VFR BLD CALC: 26.7 % (ref 42–52)
HEMOGLOBIN: 9 GM/DL (ref 14–18)
MCH RBC QN AUTO: 33.1 PG (ref 26–33)
MCHC RBC AUTO-ENTMCNC: 33.7 GM/DL (ref 32.2–35.5)
MCV RBC AUTO: 98.2 FL (ref 80–94)
PLATELET # BLD: 147 THOU/MM3 (ref 130–400)
PMV BLD AUTO: 9.3 FL (ref 9.4–12.4)
POTASSIUM SERPL-SCNC: 3.9 MEQ/L (ref 3.5–5.2)
RBC # BLD: 2.72 MILL/MM3 (ref 4.7–6.1)
SODIUM BLD-SCNC: 127 MEQ/L (ref 135–145)
WBC # BLD: 4.9 THOU/MM3 (ref 4.8–10.8)

## 2021-08-07 PROCEDURE — 80048 BASIC METABOLIC PNL TOTAL CA: CPT

## 2021-08-07 PROCEDURE — 36415 COLL VENOUS BLD VENIPUNCTURE: CPT

## 2021-08-07 PROCEDURE — 85027 COMPLETE CBC AUTOMATED: CPT

## 2021-08-07 PROCEDURE — 71045 X-RAY EXAM CHEST 1 VIEW: CPT

## 2021-08-07 PROCEDURE — 6370000000 HC RX 637 (ALT 250 FOR IP)

## 2021-08-07 PROCEDURE — 97530 THERAPEUTIC ACTIVITIES: CPT

## 2021-08-07 PROCEDURE — 97116 GAIT TRAINING THERAPY: CPT

## 2021-08-07 PROCEDURE — 6360000002 HC RX W HCPCS: Performed by: THORACIC SURGERY (CARDIOTHORACIC VASCULAR SURGERY)

## 2021-08-07 PROCEDURE — 2060000000 HC ICU INTERMEDIATE R&B

## 2021-08-07 PROCEDURE — 97535 SELF CARE MNGMENT TRAINING: CPT

## 2021-08-07 PROCEDURE — 6370000000 HC RX 637 (ALT 250 FOR IP): Performed by: THORACIC SURGERY (CARDIOTHORACIC VASCULAR SURGERY)

## 2021-08-07 PROCEDURE — 2580000003 HC RX 258: Performed by: THORACIC SURGERY (CARDIOTHORACIC VASCULAR SURGERY)

## 2021-08-07 RX ORDER — GUAIFENESIN 600 MG/1
600 TABLET, EXTENDED RELEASE ORAL 2 TIMES DAILY
Status: DISCONTINUED | OUTPATIENT
Start: 2021-08-07 | End: 2021-08-09 | Stop reason: HOSPADM

## 2021-08-07 RX ORDER — POTASSIUM CHLORIDE 20 MEQ/1
TABLET, EXTENDED RELEASE ORAL
Status: COMPLETED
Start: 2021-08-07 | End: 2021-08-07

## 2021-08-07 RX ORDER — FUROSEMIDE 20 MG/1
20 TABLET ORAL DAILY
Status: DISCONTINUED | OUTPATIENT
Start: 2021-08-08 | End: 2021-08-07

## 2021-08-07 RX ORDER — CLOPIDOGREL BISULFATE 75 MG/1
75 TABLET ORAL DAILY
Status: DISCONTINUED | OUTPATIENT
Start: 2021-08-07 | End: 2021-08-09 | Stop reason: HOSPADM

## 2021-08-07 RX ORDER — ASPIRIN 81 MG/1
81 TABLET ORAL DAILY
Status: DISCONTINUED | OUTPATIENT
Start: 2021-08-08 | End: 2021-08-09 | Stop reason: HOSPADM

## 2021-08-07 RX ORDER — FUROSEMIDE 20 MG/1
20 TABLET ORAL 2 TIMES DAILY
Status: DISCONTINUED | OUTPATIENT
Start: 2021-08-07 | End: 2021-08-09 | Stop reason: HOSPADM

## 2021-08-07 RX ORDER — CARVEDILOL 3.12 MG/1
3.12 TABLET ORAL 2 TIMES DAILY WITH MEALS
Status: DISCONTINUED | OUTPATIENT
Start: 2021-08-07 | End: 2021-08-08

## 2021-08-07 RX ADMIN — ASPIRIN 325 MG: 325 TABLET, COATED ORAL at 08:58

## 2021-08-07 RX ADMIN — FUROSEMIDE 20 MG: 20 TABLET ORAL at 17:13

## 2021-08-07 RX ADMIN — Medication 1 TABLET: at 08:54

## 2021-08-07 RX ADMIN — POTASSIUM CHLORIDE 20 MEQ: 1500 TABLET, EXTENDED RELEASE ORAL at 07:55

## 2021-08-07 RX ADMIN — CLOPIDOGREL BISULFATE 75 MG: 75 TABLET ORAL at 12:50

## 2021-08-07 RX ADMIN — FUROSEMIDE 20 MG: 10 INJECTION, SOLUTION INTRAMUSCULAR; INTRAVENOUS at 08:53

## 2021-08-07 RX ADMIN — METOPROLOL TARTRATE 25 MG: 25 TABLET, FILM COATED ORAL at 08:54

## 2021-08-07 RX ADMIN — ENOXAPARIN SODIUM 40 MG: 40 INJECTION SUBCUTANEOUS at 08:54

## 2021-08-07 RX ADMIN — SODIUM CHLORIDE, PRESERVATIVE FREE 10 ML: 5 INJECTION INTRAVENOUS at 08:54

## 2021-08-07 RX ADMIN — SODIUM CHLORIDE, PRESERVATIVE FREE 10 ML: 5 INJECTION INTRAVENOUS at 20:51

## 2021-08-07 RX ADMIN — ATORVASTATIN CALCIUM 20 MG: 20 TABLET, FILM COATED ORAL at 20:51

## 2021-08-07 RX ADMIN — GUAIFENESIN 600 MG: 600 TABLET, EXTENDED RELEASE ORAL at 20:51

## 2021-08-07 RX ADMIN — GUAIFENESIN 600 MG: 600 TABLET, EXTENDED RELEASE ORAL at 12:48

## 2021-08-07 RX ADMIN — PANTOPRAZOLE SODIUM 40 MG: 40 TABLET, DELAYED RELEASE ORAL at 06:10

## 2021-08-07 ASSESSMENT — PAIN SCALES - GENERAL
PAINLEVEL_OUTOF10: 0

## 2021-08-07 ASSESSMENT — PAIN DESCRIPTION - ONSET: ONSET: ON-GOING

## 2021-08-07 ASSESSMENT — PAIN DESCRIPTION - PAIN TYPE: TYPE: SURGICAL PAIN

## 2021-08-07 ASSESSMENT — PAIN DESCRIPTION - LOCATION: LOCATION: INCISION;STERNUM

## 2021-08-07 ASSESSMENT — PAIN DESCRIPTION - DESCRIPTORS: DESCRIPTORS: DISCOMFORT

## 2021-08-07 ASSESSMENT — PAIN DESCRIPTION - ORIENTATION: ORIENTATION: MID

## 2021-08-07 ASSESSMENT — PAIN DESCRIPTION - FREQUENCY: FREQUENCY: CONTINUOUS

## 2021-08-07 NOTE — PROGRESS NOTES
[address] [facility] PHYSICAL THERAPY  DAILY NOTE  [facility] ICU STEPDOWN TELEMETRY 4K - 3U-12/412-O      Time In: 07  Time Out: 9181  Timed Code Treatment Minutes: 24 Minutes  Minutes: 24          Date: 2021  Patient Name: Perez Sigala,  Gender:  male        MRN: [id number]  : 1970  (46 y.o.)     Referring Practitioner: Osmany Childers MD  Diagnosis: CAD, multiple vessel  Additional Pertinent Hx: Pt with hx of stroke (L sided residual deficits) is s/p CABG x 3 (DOS: 8/3/21). Prior Level of Function:  Lives With: Son (son will be around most of the time to help out when needed)  Type of Home: House  Home Layout: One level  Home Access: Stairs to enter without rails  Entrance Stairs - Number of Steps: 1 + 2  Home Equipment: 4 wheeled walker, Cane, Quad cane (pt uses the cane before admission, just got the walker last week)   Bathroom Shower/Tub: Tub/Shower unit  Bathroom Toilet: Standard  Bathroom Equipment: Tub transfer bench    Receives Help From: Friend(s)  ADL Assistance: Independent  Homemaking Assistance: Independent  Homemaking Responsibilities: Yes  Ambulation Assistance: Needs assistance  Transfer Assistance: Independent  Active : Yes  Additional Comments: Pt reports he is dependent on cane at baseline due to hx of stroke with L-sided deficits    Restrictions/Precautions:  Restrictions/Precautions: Cardiac, Surgical Protocols, Fall Risk  Position Activity Restriction  Sternal Precautions: No Pushing, No Pulling, 10# Lifting Restrictions  Other position/activity restrictions: Chest tubes. Pt had CVA in 2021 effect L side. SUBJECTIVE: pt cooperative for amb and inc distance. Verbally reviewed walking program, RPE scale, monitoring HR, environment limitations for example temp/humidity outside, sternal precautions, keeping track on chart. Pt stated heart booklet is at home and declined PT to get new one to review. Pt stated will be ok with it at home.     PAIN: initially c/o chest pain however resolved with pt sitting upright-RN aware    Vitals: Oxygen: on room air with O2 sats at 90% initially and improved to 93% with amb, HR initially 76 and /76, HR after amb was 78    OBJECTIVE:  Bed Mobility:  Not Tested    Transfers:  Sit to Stand: Contact Guard Assistance  Stand to Sit:Stand By Assistance  Pt hugged heart bear and maintained sternal precautions  Ambulation:  Stand By Assistance  Distance: 250'x1  Surface: Level Tile  Device:4 Wheeled Walker  Gait Deviations:  Slow Justyna and min fwd posture and mild path deviations, pt rated walk at somewhat hard for RPE scale and stated legs were fatigued at end of amb. Pt stated LLE fatigued quicker than RLE with hx of CVA. Functional Outcome Measures: Completed       ASSESSMENT:  Assessment: Patient progressing toward established goals. Activity Tolerance:  Patient tolerance of  treatment: good. Equipment Recommendations:Equipment Needed: No  Discharge Recommendations:  24 hour supervision or assist, Home with Home health PT    Plan: Times per week: 6 x CABG  Times per day: Daily  Current Treatment Recommendations: Strengthening, Home Exercise Program, Balance Training, Endurance Training, Safety Education & Training, Patient/Caregiver Education & Training, Functional Mobility Training, Transfer Training, Gait Training, Stair training    Patient Education  Patient Education: Precautions/Restrictions, [employer] Mobility, Transfers, Gait, RPE Scale, Heart Booklet-verbally reviewed    Goals:  Patient goals : return home  Short term goals  Time Frame for Short term goals: by hospital discharge  Short term goal 1: Supine to/from sit with modified independence, maintaining sternal precautions, for ease of transfers at discharge site. Short term goal 2: Sit to/from stand with modified independence, maintaining sternal precautions, for ease of transfers at discharge site.   Short term goal 3: Ambulate 48' with LRD and modified independence for houshold distance ambulation. Short term goal 4: Ascend/descend 3 steps without HR with Tom x 1 for access to home. Short term goal 5: Comply with standing UE and LE exercies from CABG protocol to improve endurance and functional strength. Long term goals  Time Frame for Long term goals : N/A due to short ELOS. Following session, patient left in safe position with all fall risk precautions in place.

## 2021-08-07 NOTE — PROGRESS NOTES
CT Surgery    POD #4 S/P CABG x 3 (LIMA to LAD, Svg to D1, and SVG to PDA). The patient denies any complaints. Admits to having 3 BMs 8/6, but none documented. He says that he has been walking in the hallways and using his I/S regularly. Approximately 150 mls total in pleuravacs this morning. P 73 (NSR), /76, R 16, Wt: 72.8, Chest tube: 150/24 hrs, Urine:  1.6L    Heart:  Regular, no murmurs, mild rub  Sternum:  Stable without click  Lungs:  Occasional expiratory wheeze bilaterally, no rales or rhonchi  Abd:  Soft, non-tender, bowel sounds present  Ext:  No cyanosis or edema    Labs:  Na 127, K 3.9, CL90, CO2 28, BUN 16, Cr 0.8, Glu 129, WBC 4.9, Hgb 9, Plt 147    CXR:  Better Aeration, less atelectasis, no obvious PTX    Assessment:  1. POD #4 S/P  CABG x 3 (LIMA to LAD, Svg to D1, and SVG to PDA). 2.  History of CVA with Left Lower extremity weakness  3. Hyponatremia  4. HTN  5. Tobacco abuse    Plan:  1. Start Plavix 75 mg PO Q day  2. Transition lasix to 20 mg PO BID  3. Guaifenesin, encourage I/S  4. Strict I/Os to determine when chest tubes can be removed.     BOWEN TORRE, DO

## 2021-08-07 NOTE — PROGRESS NOTES
[facility] ICU STEPDOWN TELEMETRY 4K  Occupational Therapy  Daily Note  Time:   Time In: 0554  Time Out: 1412  Timed Code Treatment Minutes: 28 Minutes  Minutes: 28          Date: 2021  Patient Name: Laura Rodriguez,   Gender: male      Room: [facility]19/019-A  MRN: [id number]  : 1970  (46 y.o.)  Referring Practitioner: Maya Jeffery MD  Diagnosis: CAD, multiple vessel  Additional Pertinent Hx: 46year old male non-diabetic presents with worsening fatigue and syncopal episodes without chest pain. Murmur detected on physical exam prompted TTE & MARY, essentially showing low normal LVEF and mild AS (peak gradient 22). LHC shows severe multivessel CAD. Patient incidentally reports onset of left-sided weakness after COVID vaccination in February, which has improved. Previous heavy smoker, in process of quitting. Reports drinks approx 8 beers per day. Pt with hx of stroke (L sided residual deficits) is s/p CABG x 3 (DOS: 8/3/21). Restrictions/Precautions:  Restrictions/Precautions: Cardiac, Surgical Protocols, Fall Risk  Position Activity Restriction  Sternal Precautions: No Pushing, No Pulling, 10# Lifting Restrictions  Other position/activity restrictions: Chest tubes. Pt had CVA in 2021 effect L side. SUBJECTIVE: Pt laying in bed upon arrival, pt agreeable to OT session, RN gave verbal approval for session    PAIN: 0/10:     Vitals: Vitals not assessed per clinical judgement, see nursing flowsheet    COGNITION: WFL    ADL:   Upper Extremity Dressing: Contact Guard Assistance. with pt sitting at the EOB  Lower Extremity Dressin [address] with pt standing due to multiple tubes/lines. BALANCE:  Standing Balance: Contact Guard Assistance.  with pt using rollator walker for support, and 1 UE release to complete ADL routine    BED MOBILITY:  Supine to Sit: Contact Guard Assistance max vc's to maintain sternal prec  Sit to Supine: Minimal Assistance with bringing care.    Following session, patient left in safe position with all fall risk precautions in place.

## 2021-08-08 ENCOUNTER — APPOINTMENT (OUTPATIENT)
Dept: GENERAL RADIOLOGY | Age: 51
DRG: 234 | End: 2021-08-08
Attending: THORACIC SURGERY (CARDIOTHORACIC VASCULAR SURGERY)
Payer: COMMERCIAL

## 2021-08-08 LAB
ANION GAP SERPL CALCULATED.3IONS-SCNC: 11 MEQ/L (ref 8–16)
BUN BLDV-MCNC: 19 MG/DL (ref 7–22)
CALCIUM SERPL-MCNC: 8.9 MG/DL (ref 8.5–10.5)
CHLORIDE BLD-SCNC: 93 MEQ/L (ref 98–111)
CO2: 24 MEQ/L (ref 23–33)
CREAT SERPL-MCNC: 0.7 MG/DL (ref 0.4–1.2)
ERYTHROCYTE [DISTWIDTH] IN BLOOD BY AUTOMATED COUNT: 14.8 % (ref 11.5–14.5)
ERYTHROCYTE [DISTWIDTH] IN BLOOD BY AUTOMATED COUNT: 53.9 FL (ref 35–45)
GFR SERPL CREATININE-BSD FRML MDRD: > 90 ML/MIN/1.73M2
GLUCOSE BLD-MCNC: 172 MG/DL (ref 70–108)
HCT VFR BLD CALC: 25.7 % (ref 42–52)
HEMOGLOBIN: 8.7 GM/DL (ref 14–18)
MCH RBC QN AUTO: 33.7 PG (ref 26–33)
MCHC RBC AUTO-ENTMCNC: 33.9 GM/DL (ref 32.2–35.5)
MCV RBC AUTO: 99.6 FL (ref 80–94)
PLATELET # BLD: 146 THOU/MM3 (ref 130–400)
PMV BLD AUTO: 9.6 FL (ref 9.4–12.4)
POTASSIUM SERPL-SCNC: 3.3 MEQ/L (ref 3.5–5.2)
RBC # BLD: 2.58 MILL/MM3 (ref 4.7–6.1)
SODIUM BLD-SCNC: 128 MEQ/L (ref 135–145)
WBC # BLD: 5.3 THOU/MM3 (ref 4.8–10.8)

## 2021-08-08 PROCEDURE — 36415 COLL VENOUS BLD VENIPUNCTURE: CPT

## 2021-08-08 PROCEDURE — 6370000000 HC RX 637 (ALT 250 FOR IP): Performed by: THORACIC SURGERY (CARDIOTHORACIC VASCULAR SURGERY)

## 2021-08-08 PROCEDURE — 6370000000 HC RX 637 (ALT 250 FOR IP)

## 2021-08-08 PROCEDURE — 97110 THERAPEUTIC EXERCISES: CPT

## 2021-08-08 PROCEDURE — 97530 THERAPEUTIC ACTIVITIES: CPT

## 2021-08-08 PROCEDURE — 2580000003 HC RX 258: Performed by: THORACIC SURGERY (CARDIOTHORACIC VASCULAR SURGERY)

## 2021-08-08 PROCEDURE — 6360000002 HC RX W HCPCS: Performed by: THORACIC SURGERY (CARDIOTHORACIC VASCULAR SURGERY)

## 2021-08-08 PROCEDURE — 80048 BASIC METABOLIC PNL TOTAL CA: CPT

## 2021-08-08 PROCEDURE — 2060000000 HC ICU INTERMEDIATE R&B

## 2021-08-08 PROCEDURE — 71045 X-RAY EXAM CHEST 1 VIEW: CPT

## 2021-08-08 PROCEDURE — 85027 COMPLETE CBC AUTOMATED: CPT

## 2021-08-08 RX ORDER — CARVEDILOL 6.25 MG/1
6.25 TABLET ORAL 2 TIMES DAILY WITH MEALS
Status: DISCONTINUED | OUTPATIENT
Start: 2021-08-08 | End: 2021-08-08

## 2021-08-08 RX ORDER — COLCHICINE 0.6 MG/1
0.6 TABLET ORAL DAILY
Status: DISCONTINUED | OUTPATIENT
Start: 2021-08-08 | End: 2021-08-09 | Stop reason: HOSPADM

## 2021-08-08 RX ORDER — LANOLIN ALCOHOL/MO/W.PET/CERES
5 CREAM (GRAM) TOPICAL NIGHTLY PRN
Status: DISCONTINUED | OUTPATIENT
Start: 2021-08-08 | End: 2021-08-09 | Stop reason: HOSPADM

## 2021-08-08 RX ORDER — POTASSIUM CHLORIDE 20 MEQ/1
40 TABLET, EXTENDED RELEASE ORAL ONCE
Status: COMPLETED | OUTPATIENT
Start: 2021-08-08 | End: 2021-08-08

## 2021-08-08 RX ORDER — POTASSIUM CHLORIDE 20 MEQ/1
TABLET, EXTENDED RELEASE ORAL
Status: COMPLETED
Start: 2021-08-08 | End: 2021-08-08

## 2021-08-08 RX ADMIN — COLCHICINE 0.6 MG: 0.6 TABLET, FILM COATED ORAL at 12:57

## 2021-08-08 RX ADMIN — FUROSEMIDE 20 MG: 20 TABLET ORAL at 08:27

## 2021-08-08 RX ADMIN — SODIUM CHLORIDE, PRESERVATIVE FREE 10 ML: 5 INJECTION INTRAVENOUS at 08:28

## 2021-08-08 RX ADMIN — SODIUM CHLORIDE, PRESERVATIVE FREE 10 ML: 5 INJECTION INTRAVENOUS at 20:16

## 2021-08-08 RX ADMIN — BISACODYL 10 MG: 5 TABLET, COATED ORAL at 12:56

## 2021-08-08 RX ADMIN — GUAIFENESIN 600 MG: 600 TABLET, EXTENDED RELEASE ORAL at 08:27

## 2021-08-08 RX ADMIN — METOPROLOL TARTRATE 12.5 MG: 25 TABLET, FILM COATED ORAL at 12:57

## 2021-08-08 RX ADMIN — ASPIRIN 81 MG: 81 TABLET, COATED ORAL at 08:27

## 2021-08-08 RX ADMIN — POTASSIUM CHLORIDE 40 MEQ: 1500 TABLET, EXTENDED RELEASE ORAL at 06:34

## 2021-08-08 RX ADMIN — CLOPIDOGREL BISULFATE 75 MG: 75 TABLET ORAL at 08:27

## 2021-08-08 RX ADMIN — PANTOPRAZOLE SODIUM 40 MG: 40 TABLET, DELAYED RELEASE ORAL at 06:23

## 2021-08-08 RX ADMIN — ATORVASTATIN CALCIUM 20 MG: 20 TABLET, FILM COATED ORAL at 20:16

## 2021-08-08 RX ADMIN — METOPROLOL TARTRATE 25 MG: 25 TABLET, FILM COATED ORAL at 20:17

## 2021-08-08 RX ADMIN — METOPROLOL TARTRATE 12.5 MG: 25 TABLET, FILM COATED ORAL at 15:46

## 2021-08-08 RX ADMIN — FUROSEMIDE 20 MG: 20 TABLET ORAL at 17:30

## 2021-08-08 RX ADMIN — BISACODYL 10 MG: 5 TABLET, COATED ORAL at 20:16

## 2021-08-08 RX ADMIN — POTASSIUM CHLORIDE 40 MEQ: 1500 TABLET, EXTENDED RELEASE ORAL at 12:56

## 2021-08-08 RX ADMIN — ENOXAPARIN SODIUM 40 MG: 40 INJECTION SUBCUTANEOUS at 08:27

## 2021-08-08 RX ADMIN — Medication 1 TABLET: at 08:27

## 2021-08-08 ASSESSMENT — PAIN SCALES - GENERAL
PAINLEVEL_OUTOF10: 0

## 2021-08-08 NOTE — PROGRESS NOTES
CT Surgery     POD #5 S/P CABG x 3 (LIMA to LAD, Svg to D1, and SVG to PDA). The patient denies any complaints. Nursing informed me that the patient has been refusing his scheduled carvedilol. He has not taken any carvedilol since surgery. I spent approximately 20 minutes with him explaining the importance of Carvedilol to treat his BP, sternal precautions, and allowing us to help him get better. He adamantly refused to take carvedilol stating that he had taken it prior to surgery and it \"dropped my BP to 60/20. \"  I was able to convince him to try metoprolol 12.5 mg PO.     P 74 (NSR), /77, R 18, Wt: 72.8, Chest tube: 21/40/150 in24 hrs, Urine:  2.1L     Heart:  Regular, no murmurs, mild rub  Sternum:  Stable without click  Lungs:  Occasional expiratory wheeze bilaterally, no rales or rhonchi  Abd:  Soft, non-tender, bowel sounds present  Ext:  No cyanosis or edema     Labs:  Na 128, K 3.3, CL93, CO2 24, BUN 19, Cr 0.7, Glu 129, WBC 5.3, Hgb 8.7, Plt 146     CXR:  No Pneumothorax, no significant effusions      Assessment:  1. POD #4 S/P  CABG x 3 (LIMA to LAD, Svg to D1, and SVG to PDA). 2.  History of CVA with Left Lower extremity weakness  3. Hyponatremia  4. Hypokalemi  5. HTN  6. Tobacco abuse     Plan:  1. Continue lasix to 20 mg PO BID  2. Start Colchicine 0.6mg PO daily  3. Start Metoprolol 12.5 mg PO BID and adjust as needed. 4.  Replace K with another 40 mEq PO. 5.  The patient was encouraged to use his I/S and ambulate in the hallways.     BOWEN TORRE,

## 2021-08-08 NOTE — PROGRESS NOTES
[facility] ICU STEPDOWN TELEMETRY 4K  Occupational Therapy  Daily Note  Time:   Time In: 906  Time Out: 930  Timed Code Treatment Minutes: 24 Minutes  Minutes: 24          Date: 2021  Patient Name: Alexadner Anderson,   Gender: male      Room: [facility]19/019-A  MRN: [id number]  : 1970  (46 y.o.)  Referring Practitioner: Alyson Medrano MD  Diagnosis: CAD, multiple vessel  Additional Pertinent Hx: 46year old male non-diabetic presents with worsening fatigue and syncopal episodes without chest pain. Murmur detected on physical exam prompted TTE & MARY, essentially showing low normal LVEF and mild AS (peak gradient 22). LHC shows severe multivessel CAD. Patient incidentally reports onset of left-sided weakness after COVID vaccination in February, which has improved. Previous heavy smoker, in process of quitting. Reports drinks approx 8 beers per day. Pt with hx of stroke (L sided residual deficits) is s/p CABG x 3 (DOS: 8/3/21). Restrictions/Precautions:  Restrictions/Precautions: Cardiac, Surgical Protocols, Fall Risk  Position Activity Restriction  Sternal Precautions: No Pushing, No Pulling, 10# Lifting Restrictions  Other position/activity restrictions: Chest tubes. Pt had CVA in 2021 effect L side. SUBJECTIVE: Pt sitting in recliner chair upon arrival, pt agreeable to OT session, RN gave verbal approval for session    PAIN: 0/10:     Vitals: Oxygen: 94%, and 99% on RA and during functional mob    COGNITION: WFL    ADL:   No ADL's completed this session. Huyen Cheung BALANCE:  Standing Balance: Contact Guard Assistance. for safety with rollator walker and due to multiple lines/tubes    BED MOBILITY:  Not Tested    TRANSFERS:  Sit to Stand:  Contact Guard Assistance. vc's for sternal precautions and bringing hips to the edge of chair  Stand to Sit: Stand By Assistance.  vc's to not reach back    FUNCTIONAL MOBILITY:  Assistive Device: rollator walker  Assist Level:  Contact Guard Assistance. Distance: HH distances with pt maintaining o2 at 94-95% with 1 extended rest break       ADDITIONAL ACTIVITIES:  Pt completed CABG Step II exercises x10 reps x1 set this date in order to increase strength and improve activity tolerance for ADLs and homemaking tasks. Pt exhibited no fatigue during task, requring 1 rest breaks and no VCs for technique. ASSESSMENT:     Activity Tolerance:  Patient tolerance of  treatment: good. Discharge Recommendations: Continue to assess pending progress, 24 hour supervision or assist, Home with Home health OT   Equipment Recommendations: Equipment Needed: No  Plan: Times per week: 6x  Times per day: Daily  Current Treatment Recommendations: Strengthening, Positioning, Pain Management, Safety Education & Training, Balance Training, Self-Care / ADL, Patient/Caregiver Education & Training, Cognitive/Perceptual Training, Functional Mobility Training, Endurance Training    Patient Education  Patient Education: Precautions, Reviewed Prior Education and Assistive Device Safety    Goals  Short term goals  Time Frame for Short term goals: by discharge  Short term goal 1: Pt will complete CABG step II exercises X10 reps to increase overall strength/endurance needed for ADL tasks. Short term goal 2: pt will complete function transfers CGA with min vc to follow sternal precautions and min vc's for safety  Short term goal 3: Pt will perform functional mobility SBA within [facility] distances to perform BADLs  Short term goal 4: Pt will perform LB dressing with min A with LHAE  prn to increase independence with self care. Following session, patient left in safe position with all fall risk precautions in place.

## 2021-08-08 NOTE — PLAN OF CARE
Problem: Falls - Risk of:  Goal: Will remain free from falls  Description: Will remain free from falls  Outcome: Ongoing  Note: Hourly rounding, bed and chair alarm in use, surgical discomfort, 4 chest tubes -20 sx, left side deficits, needing walker and gait belt with ambulation     Problem: Cardiac Output - Decreased:  Goal: Hemodynamic stability will improve  Description: Hemodynamic stability will improve  Outcome: Ongoing  Note: Monitor vital signs and labs, chest tube output      Problem: Pain:  Description: Pain management should include both nonpharmacologic and pharmacologic interventions. Goal: Pain level will decrease  Description: Pain level will decrease  Outcome: Ongoing  Note: Patient denies pain today, continue to offer pain medications prn      Problem: Skin Integrity - Impaired:  Goal: Will show no infection signs and symptoms  Description: Will show no infection signs and symptoms  Outcome: Ongoing  Note: Patient educated on q2hr turns, incision care per protocol, scattered bruising and abrasions     Problem: Discharge Planning:  Goal: Participates in care planning  Description: Participates in care planning  Outcome: Ongoing  Note: Discharge home with son when medically stable      Problem: Gas Exchange - Impaired:  Goal: Levels of oxygenation will improve  Description: Levels of oxygenation will improve  Outcome: Ongoing  Note: Abnormal lung sounds and chest x ray, 4 chest tubes to -20 sx, encourage IS      Problem: Nutrition  Goal: Optimal nutrition therapy  Outcome: Ongoing  Note: Strict I&Os, offer foods he likes, educated on protein for incision healing     Problem: Pain:  Description: Pain management should include both nonpharmacologic and pharmacologic interventions. Goal: Pain level will decrease  Description: Pain level will decrease  Outcome: Ongoing  Note: Patient denies pain today, continue to offer pain medications prn      Problem:  Activity Intolerance:  Goal: Able to perform prescribed physical activity  Description: Able to perform prescribed physical activity  Outcome: Ongoing  Note: Patient encourage to ambulate in room and hallway 3 times per day, PT/OT/ CR seeing patient      Problem: Fluid Volume - Imbalance:  Goal: Chest tube drainage is within specified parameters  Description: Chest tube drainage is within specified parameters  Outcome: Ongoing  Note: Monitor chest tube output, 4 chest tubes -20 sx      Problem: Tissue Perfusion - Cardiopulmonary, Altered:  Goal: Hemodynamic stability will improve  Description: Hemodynamic stability will improve  Outcome: Ongoing  Note: Monitor vital signs and labs, chest tube output      Problem: Tissue Perfusion - Cardiopulmonary, Altered:  Goal: Will show no evidence of cardiac arrhythmias  Description: Will show no evidence of cardiac arrhythmias  Outcome: Ongoing  Note: Patient NSR, on continuous telemetry monitor    Care plan reviewed with patient. Patient verbalize understanding of the plan of care and contribute to goal setting.

## 2021-08-09 ENCOUNTER — APPOINTMENT (OUTPATIENT)
Dept: GENERAL RADIOLOGY | Age: 51
DRG: 234 | End: 2021-08-09
Attending: THORACIC SURGERY (CARDIOTHORACIC VASCULAR SURGERY)
Payer: COMMERCIAL

## 2021-08-09 VITALS
SYSTOLIC BLOOD PRESSURE: 130 MMHG | HEART RATE: 82 BPM | BODY MASS INDEX: 22.98 KG/M2 | OXYGEN SATURATION: 100 % | DIASTOLIC BLOOD PRESSURE: 66 MMHG | WEIGHT: 160.5 LBS | TEMPERATURE: 97.7 F | HEIGHT: 70 IN | RESPIRATION RATE: 18 BRPM

## 2021-08-09 LAB
ANION GAP SERPL CALCULATED.3IONS-SCNC: 9 MEQ/L (ref 8–16)
BUN BLDV-MCNC: 14 MG/DL (ref 7–22)
CALCIUM SERPL-MCNC: 9.2 MG/DL (ref 8.5–10.5)
CHLORIDE BLD-SCNC: 94 MEQ/L (ref 98–111)
CO2: 27 MEQ/L (ref 23–33)
CREAT SERPL-MCNC: 0.9 MG/DL (ref 0.4–1.2)
ERYTHROCYTE [DISTWIDTH] IN BLOOD BY AUTOMATED COUNT: 14.8 % (ref 11.5–14.5)
ERYTHROCYTE [DISTWIDTH] IN BLOOD BY AUTOMATED COUNT: 54.4 FL (ref 35–45)
GFR SERPL CREATININE-BSD FRML MDRD: 88 ML/MIN/1.73M2
GLUCOSE BLD-MCNC: 153 MG/DL (ref 70–108)
HCT VFR BLD CALC: 25.4 % (ref 42–52)
HEMOGLOBIN: 8.5 GM/DL (ref 14–18)
MCH RBC QN AUTO: 33.6 PG (ref 26–33)
MCHC RBC AUTO-ENTMCNC: 33.5 GM/DL (ref 32.2–35.5)
MCV RBC AUTO: 100.4 FL (ref 80–94)
PLATELET # BLD: 185 THOU/MM3 (ref 130–400)
PMV BLD AUTO: 9.6 FL (ref 9.4–12.4)
POTASSIUM SERPL-SCNC: 4.7 MEQ/L (ref 3.5–5.2)
RBC # BLD: 2.53 MILL/MM3 (ref 4.7–6.1)
SODIUM BLD-SCNC: 130 MEQ/L (ref 135–145)
WBC # BLD: 5.3 THOU/MM3 (ref 4.8–10.8)

## 2021-08-09 PROCEDURE — APPSS60 APP SPLIT SHARED TIME 46-60 MINUTES: Performed by: PHYSICIAN ASSISTANT

## 2021-08-09 PROCEDURE — 85027 COMPLETE CBC AUTOMATED: CPT

## 2021-08-09 PROCEDURE — 36415 COLL VENOUS BLD VENIPUNCTURE: CPT

## 2021-08-09 PROCEDURE — 6370000000 HC RX 637 (ALT 250 FOR IP): Performed by: THORACIC SURGERY (CARDIOTHORACIC VASCULAR SURGERY)

## 2021-08-09 PROCEDURE — 97116 GAIT TRAINING THERAPY: CPT

## 2021-08-09 PROCEDURE — 6370000000 HC RX 637 (ALT 250 FOR IP): Performed by: PHYSICIAN ASSISTANT

## 2021-08-09 PROCEDURE — 80048 BASIC METABOLIC PNL TOTAL CA: CPT

## 2021-08-09 PROCEDURE — 6360000002 HC RX W HCPCS: Performed by: THORACIC SURGERY (CARDIOTHORACIC VASCULAR SURGERY)

## 2021-08-09 PROCEDURE — 2580000003 HC RX 258: Performed by: THORACIC SURGERY (CARDIOTHORACIC VASCULAR SURGERY)

## 2021-08-09 PROCEDURE — 97530 THERAPEUTIC ACTIVITIES: CPT

## 2021-08-09 PROCEDURE — 97110 THERAPEUTIC EXERCISES: CPT

## 2021-08-09 PROCEDURE — 71045 X-RAY EXAM CHEST 1 VIEW: CPT

## 2021-08-09 RX ORDER — OXYCODONE HYDROCHLORIDE 5 MG/1
5 TABLET ORAL EVERY 8 HOURS PRN
Qty: 21 TABLET | Refills: 0 | Status: SHIPPED | OUTPATIENT
Start: 2021-08-09 | End: 2021-08-16

## 2021-08-09 RX ORDER — CLOPIDOGREL BISULFATE 75 MG/1
75 TABLET ORAL DAILY
Qty: 30 TABLET | Refills: 3 | Status: SHIPPED | OUTPATIENT
Start: 2021-08-09 | End: 2021-10-07 | Stop reason: SDUPTHER

## 2021-08-09 RX ORDER — ASPIRIN 81 MG/1
81 TABLET ORAL DAILY
Qty: 30 TABLET | Refills: 3 | Status: SHIPPED | OUTPATIENT
Start: 2021-08-09 | End: 2021-12-10 | Stop reason: SDUPTHER

## 2021-08-09 RX ORDER — LISINOPRIL 5 MG/1
5 TABLET ORAL DAILY
Status: DISCONTINUED | OUTPATIENT
Start: 2021-08-09 | End: 2021-08-09 | Stop reason: HOSPADM

## 2021-08-09 RX ORDER — FUROSEMIDE 20 MG/1
20 TABLET ORAL DAILY
Qty: 30 TABLET | Refills: 0 | Status: SHIPPED | OUTPATIENT
Start: 2021-08-09 | End: 2021-08-30 | Stop reason: ALTCHOICE

## 2021-08-09 RX ORDER — LISINOPRIL 5 MG/1
5 TABLET ORAL DAILY
Qty: 30 TABLET | Refills: 3 | Status: SHIPPED | OUTPATIENT
Start: 2021-08-09 | End: 2021-11-22

## 2021-08-09 RX ORDER — ATORVASTATIN CALCIUM 40 MG/1
40 TABLET, FILM COATED ORAL NIGHTLY
Qty: 30 TABLET | Refills: 1 | Status: SHIPPED | OUTPATIENT
Start: 2021-08-09 | End: 2021-12-10 | Stop reason: SDUPTHER

## 2021-08-09 RX ORDER — POTASSIUM CHLORIDE 20 MEQ/1
20 TABLET, EXTENDED RELEASE ORAL DAILY
Qty: 30 TABLET | Refills: 0 | Status: SHIPPED | OUTPATIENT
Start: 2021-08-09 | End: 2021-08-30 | Stop reason: ALTCHOICE

## 2021-08-09 RX ORDER — MULTIVITAMIN WITH IRON
1 TABLET ORAL
Qty: 30 TABLET | Refills: 1 | Status: SHIPPED | OUTPATIENT
Start: 2021-08-09

## 2021-08-09 RX ADMIN — GUAIFENESIN 600 MG: 600 TABLET, EXTENDED RELEASE ORAL at 09:03

## 2021-08-09 RX ADMIN — ENOXAPARIN SODIUM 40 MG: 40 INJECTION SUBCUTANEOUS at 09:02

## 2021-08-09 RX ADMIN — Medication 1 TABLET: at 09:03

## 2021-08-09 RX ADMIN — METOPROLOL TARTRATE 12.5 MG: 25 TABLET, FILM COATED ORAL at 09:03

## 2021-08-09 RX ADMIN — BISACODYL 10 MG: 5 TABLET, COATED ORAL at 09:02

## 2021-08-09 RX ADMIN — METOPROLOL TARTRATE 12.5 MG: 25 TABLET, FILM COATED ORAL at 12:05

## 2021-08-09 RX ADMIN — CLOPIDOGREL BISULFATE 75 MG: 75 TABLET ORAL at 09:02

## 2021-08-09 RX ADMIN — LISINOPRIL 5 MG: 5 TABLET ORAL at 09:29

## 2021-08-09 RX ADMIN — ASPIRIN 81 MG: 81 TABLET, COATED ORAL at 09:02

## 2021-08-09 RX ADMIN — COLCHICINE 0.6 MG: 0.6 TABLET, FILM COATED ORAL at 09:02

## 2021-08-09 RX ADMIN — SODIUM CHLORIDE, PRESERVATIVE FREE 10 ML: 5 INJECTION INTRAVENOUS at 09:06

## 2021-08-09 RX ADMIN — FUROSEMIDE 20 MG: 20 TABLET ORAL at 09:03

## 2021-08-09 RX ADMIN — PANTOPRAZOLE SODIUM 40 MG: 40 TABLET, DELAYED RELEASE ORAL at 06:51

## 2021-08-09 ASSESSMENT — PAIN SCALES - GENERAL
PAINLEVEL_OUTOF10: 0

## 2021-08-09 NOTE — PLAN OF CARE
Problem: Falls - Risk of:  Goal: Will remain free from falls  Description: Will remain free from falls  8/9/2021 0441 by Radha Apodaca RN  Outcome: Ongoing  Note: Patient free from falls this shift with call light within reach, slipper socks in place, and bed alarm in place. Problem: Cardiac Output - Decreased:  Goal: Cardiac output within specified parameters  Description: Cardiac output within specified parameters  8/9/2021 0441 by Radha Apodaca RN  Outcome: Ongoing  8/8/2021 2029 by Katelyn Michelle RN  Outcome: Ongoing  Note: Vitals q4hrs      Problem: Skin Integrity - Impaired:  Goal: Will show no infection signs and symptoms  Description: Will show no infection signs and symptoms  8/9/2021 0441 by Radha Apodaca RN  Outcome: Ongoing  8/8/2021 2029 by Katelyn Michelle RN  Outcome: Ongoing  Note: Site care done with chg and sterile water       Problem: Discharge Planning:  Goal: Participates in care planning  Description: Participates in care planning  8/9/2021 0441 by Radha Apodaca RN  Outcome: Ongoing     Problem: Fluid Volume - Imbalance:  Goal: Ability to achieve a balanced intake and output will improve  Description: Ability to achieve a balanced intake and output will improve  8/9/2021 0441 by Radha Apodaca RN  Outcome: Ongoing  8/8/2021 2029 by Katelyn Michelle RN  Outcome: Ongoing  Note: Monitoring inputs and outputs    Care plan reviewed with patient. Patient verbalizes understanding of the plan of care and contribute to goal setting.

## 2021-08-09 NOTE — PROGRESS NOTES
CLINICAL PHARMACY: DISCHARGE MED RECONCILIATION/REVIEW    [facility]) [facility] Patient?: No  Total # of Interventions Recommended: 0   -   Total # Interventions Accepted: 0  Intervention Severity:   - Level 1 Intervention Present?: No   - Level 2 #: 0   - Level 3 #: 0   Time Spent (min): 15    Additional Documentation:    Will  patient on new medications.     Bravo Simons PharmD, [facility]  8/9/2021  1:10 PM

## 2021-08-09 NOTE — DISCHARGE SUMMARY
CT/CV Surgery Discharge Summary     Pt Name: Laura Rodriguez  MRN: [id number]  YOB: 1970  Primary Care Physician: RACHEL Welsh CNP    Admit date:  8/3/2021  5:14 AM     Discharge date:  08/09/21     Disposition: Home    Admitting Diagnosis: CAD    Discharge Diagnosis: CAD    Condition: Stable    Problem List:   Patient Active Problem List   Diagnosis Code    Ac cerebral infarction w/ ischemia ([facility] [address] I63.89    Hypertension I10    Tobacco abuse Z72.0    Weakness of left lower extremity R29.898    Stroke ([facility] [address] I63.9    Multiple vessel coronary artery disease I25.10    CAD, multiple vessel I25.10       Procedures:  8/3/21   1) Coronary artery bypass grafting x 3, with the left internal mammary artery grafted to the left anterior descending artery, a reversed greater saphenous aortocoronary vein graft to the first diagonal artery, and a reversed greater saphenous aortocoronary vein graft to the posterior descending artery  2) Endoscopic greater saphenous vein harvest  3) Intraoperative coronary angiography of all distal anastomoses  4) Epi-aortic ultrasound of the ascending aorta  Reason for Admission: \"The patient is a 46y.o. year-old male who presents with crescendo angina in the context of triple vessel coronary disease,\" per Dr. Demi Wolfe. Hospital Course: Following an uncomplicated  CABG, the patient had an unremarkable and progressive convalescence without adverse events. At time of discharge, Star Fonseca was alert, tolerating a regular diet, having bowel movements, ambulating on his own accord and had adequate analgesia on oral pain medications, and had no signs of any complications. Discharge Vitals:  height is 5' 10\" (1.778 m) and weight is 160 lb 8 oz (72.8 kg). His oral temperature is 98.3 °F (36.8 °C). His blood pressure is 153/77 (abnormal) and his pulse is 81. His respiration is 18 and oxygen saturation is 96%.      DISCHARGE INSTRUCTIONS:  Discharge Medications: Medication List      START taking these medications    aspirin 81 MG EC tablet  Take 1 tablet by mouth daily  Replaces: aspirin 325 MG tablet     atorvastatin 40 MG tablet  Commonly known as: LIPITOR  Take 1 tablet by mouth nightly     clopidogrel 75 MG tablet  Commonly known as: PLAVIX  Take 1 tablet by mouth daily     furosemide 20 MG tablet  Commonly known as: Lasix  Take 1 tablet by mouth daily     metoprolol tartrate 25 MG tablet  Commonly known as: LOPRESSOR  Take 0.5 tablets by mouth 2 times daily     multivitamin Tabs tablet  Take 1 tablet by mouth daily (with breakfast)     oxyCODONE 5 MG immediate release tablet  Commonly known as: ROXICODONE  Take 1 tablet by mouth every 8 hours as needed for Pain for up to 7 days.      potassium chloride 20 MEQ extended release tablet  Commonly known as: KLOR-CON M  Take 1 tablet by mouth daily        CHANGE how you take these medications    lisinopril 5 MG tablet  Commonly known as: PRINIVIL;ZESTRIL  Take 1 tablet by mouth daily  What changed:   · medication strength  · how much to take  · when to take this        CONTINUE taking these medications    omeprazole 20 MG delayed release capsule  Commonly known as: PRILOSEC  Take 1 capsule by mouth every morning (before breakfast)     polyethylene glycol 17 GM/SCOOP powder  Commonly known as: GLYCOLAX        STOP taking these medications    aspirin 325 MG tablet  Commonly known as: EQ Aspirin  Replaced by: aspirin 81 MG EC tablet     NIFEdipine 90 MG extended release tablet  Commonly known as: PROCARDIA XL           Where to Get Your Medications      These medications were sent to [facility] [address], [address] [place] [address], [address] [place] [address] [address] [address]    Phone: [phone]   · aspirin 81 MG EC tablet  · atorvastatin 40 MG tablet  · clopidogrel 75 MG tablet  · furosemide 20 MG tablet  · lisinopril 5 MG tablet  · metoprolol tartrate 25 MG tablet  · multivitamin Tabs tablet  · potassium chloride 20 MEQ extended release tablet     You can get these medications from any pharmacy    Bring a paper prescription for each of these medications  · oxyCODONE 5 MG immediate release tablet         Diet: AHA diet as tolerated    Activity: As instructed on discharge, no lifting more than 10 lbs for one month, no driving while on narcotics. Aerobic activity (walking, climbing stairs, etc.) is encouraged. Wound Care: Clean wounds as instructed on discharge    OFFICE VISIT   ? You should have a follow up appointment in the office in about 1 month after discharge from the hospital.   ?   You may call the office to make an appointment, if you don't have an appointment. ([phone]). ? You will need a chest xray and labs taken around 3-7 days before your follow up appointment. ?   Bring any questions you have so they may be addressed. ? You will need a follow up appointment with you primary care doctor in 7-10 days from discharge, please call and make one if you do not have one.   ? You will need a follow up appointment with you Cardiologist in 2-3 weeks from discharge, please call and make one if you do not have one.   ? BRING YOUR MEDICATION LIST and medications even over the counter medications to all your follow up apointments. ? Office Location:   [address] [address], [address], [facility]Keven MICHAELS [address]            EMERGENCIES   ? You should either call 911 or go to the Emergency Room to be evaluated if you need seen immediately. ?         Sudden, severe shortness of breath go to the Emergency Room. If you have questions regarding these instructions, please call our office  [phone]. We have an answering service 24 hours a day to get your calls to the ON Call Physician, but we are often in surgery and it takes us awhile to get back to you.       Discharge Medications for PCI/MI (performed or attempted): Trop: No results found for: TROPONINT   ASA:                            Yes                      Statin:                          Yes  ACE/ARB:                   Yes          P2Y12 Inhibitor:           Yes         Beta Blocker:               Yes        Cardiac Rehab:            Yes  Dietary Consult:           Yes           Follow-up:    1   Follow up with RACHEL Sánchez CNP 2-3 weeks  2. Follow up with J Carlos Alvarez PA-C  in 3-4 weeks, with PA and Lateral CXR, CBC, and BMP. 3. The pt was agreeable to discharge and future plan of care. All questions were thoroughly answered.        Stephany Ramey PA-C   Electronincally signed 8/9/2021 at 8:47 AM    CC: RACHEL Sánchez CNP

## 2021-08-09 NOTE — PROGRESS NOTES
Discharge teaching and instructions for diagnosis/procedure of post CABG completed with patient using teachback method. AVS reviewed. Printed prescriptions given to patient. Patient voiced understanding regarding prescriptions, follow up appointments, and care of self at home. Discharged in a wheelchair to  home with support per family      Chest tube sutures to come out 8/11/2021 by [facility] nurses- on discharge instructions   RN left voicemail for [facility] with Junious Sails  RN left voicemail with Dr Rico Roger nurse's to call patient with appointment- unable to talk with anyone at this time. RN went through instructions thoroughly twice and also reinforced sternal precautions multiple times. All medications from outpatient pharmacy left with patient and all belongings left with patient   Patient refused site care and stated he is going to do it when he gets home and son is going to help him  RN also went through instructions with son who he is living with.

## 2021-08-09 NOTE — PLAN OF CARE
Problem: Falls - Risk of:  Goal: Will remain free from falls  Description: Will remain free from falls  Outcome: Ongoing  Note: No falls this shift. Bed is locked and in lowest position. Pt was instructed on how to use call light and oriented to room. Call light and overhead table within reach. Will continue to monitor   Bed alarm     Problem: Cardiac Output - Decreased:  Goal: Cardiac output within specified parameters  Description: Cardiac output within specified parameters  Outcome: Ongoing  Note: Vitals q4hrs      Problem: Pain:  Goal: Control of acute pain  Description: Control of acute pain  Outcome: Ongoing  Note: Pain Assessment: 0-10  Pain Level: 0   Patient's Stated Pain Goal: No pain   Is pain goal met at this time? Yes     Non-Pharmaceutical Pain Intervention(s): Rest, Repositioned       Problem: Skin Integrity - Impaired:  Goal: Will show no infection signs and symptoms  Description: Will show no infection signs and symptoms  Outcome: Ongoing  Note: Site care done with chg and sterile water       Problem: Nutrition  Goal: Optimal nutrition therapy  Outcome: Ongoing  Note: General diet     Problem: Discharge Planning:  Goal: Participates in care planning  Description: Participates in care planning  Outcome: Ongoing  Note: Plans to discharge home      Problem: Gas Exchange - Impaired:  Goal: Ability to maintain adequate ventilation will improve  Description: Ability to maintain adequate ventilation will improve  Outcome: Ongoing  Note: Room air      Problem: Activity Intolerance:  Goal: Able to perform prescribed physical activity  Description: Able to perform prescribed physical activity  Outcome: Ongoing  Note: Walked two times in halls     Problem:  Activity Intolerance:  Goal: Ability to tolerate increased activity will improve  Description: Ability to tolerate increased activity will improve  Outcome: Ongoing  Note: Up in chair twice     Problem: Fluid Volume - Imbalance:  Goal: Ability to achieve a balanced intake and output will improve  Description: Ability to achieve a balanced intake and output will improve  Outcome: Ongoing  Note: Monitoring inputs and outputs      Problem: Fluid Volume - Imbalance:  Goal: Chest tube drainage is within specified parameters  Description: Chest tube drainage is within specified parameters  Outcome: Ongoing  Note: Outputs q8hrs      Problem: Tissue Perfusion - Cardiopulmonary, Altered:  Goal: Absence of angina  Description: Absence of angina  Outcome: Ongoing  Note: Denies chest pain      Problem: Tissue Perfusion - Cardiopulmonary, Altered:  Goal: Will show no evidence of cardiac arrhythmias  Description: Will show no evidence of cardiac arrhythmias  Outcome: Ongoing  Note: Bundle branch      Problem: Tobacco Use:  Goal: Will participate in inpatient tobacco-use cessation counseling  Description: Will participate in inpatient tobacco-use cessation counseling  Outcome: Ongoing  Note: Past smoker -education provided    Care plan reviewed with patient and family. Patient and family verbalize understanding of the plan of care and contribute to goal setting.

## 2021-08-09 NOTE — PROGRESS NOTES
Patient noted to be pushing off chair instead of hugging bear- RN instructed importance of sternal precautions multiple times. RN notified Dr Justin and Chetna Muniz.

## 2021-08-09 NOTE — CARE COORDINATION
8/9/21, 1:03 PM EDT    [address] day: 6  Location: [facility]19/019-A Reason for admit: CAD, multiple vessel [I25.10]   Procedure:   8/3 3v CABG  8/3 Intubated - 8/3 extubated  8/9 Chest tubes removed  8/9 CXR: No acute findings    Barriers to Discharge: POD #6. Chest tubes removed today. Walked 250' with PT this weekend with RW. +BM. Dr. Briggs Neighbours wants patient to stay in hospital today d/t hypertension this morning. Lopressor dose increased. Afebrile. NSR. On room air. Ox4. Follows commands. CR/PT/OT. Dietitian consulted. Dietary ileus prevention protocol. Telemetry, I&O, daily weight, IS, sternal precautions, wound care, SCDs, ambulate. Asa, lipitor, plavix, colchicine, lovenox, po lasix 20 mg bid, lisinopril, lopressor, prn roxicodone, protonix, electrolyte replacement protocols. Na+ 130, hgb 8.5. PCP: RACHEL Paredes - CNP  Readmission Risk Score: 11%  Patient Goals/Plan/Treatment Preferences: [facility] with [facility]. Has 4-wheeled walker and shower chair. Monitor for needs.  SW on case.

## 2021-08-09 NOTE — PROGRESS NOTES
CT/CV Surgery Progress Note    2021 8:12 AM  Surgeon:  Dr. Timmy Olvera: Mr. Tamanna Eaton is resting comfortably in bed on RA with pulse ox at 96%, alert, and in no acute distress. Pt denies chest pressure, SOB, fever,chills, N/V/D. I/O last 3 completed shifts: In: 80 [P.O.:780]  Out: 3105 [Urine:2750; Chest Tube:355]    Vital Signs: BP (!) 153/77   Pulse 81   Temp 98.3 °F (36.8 °C) (Oral)   Resp 18   Ht 5' 10\" (1.778 m)   Wt 160 lb 8 oz (72.8 kg)   SpO2 96%   BMI 23.03 kg/m²    Temp (24hrs), Av.4 °F (36.9 °C), Min:98.2 °F (36.8 °C), Max:98.6 °F (37 °C)    Labs:   CBC: Recent Labs     21  0634 21  0545 21  0535   WBC 4.9 5.3 5.3   HGB 9.0* 8.7* 8.5*   HCT 26.7* 25.7* 25.4*   MCV 98.2* 99.6* 100.4*    146 185     BMP:   Recent Labs     21  0634 21  0545 21  0535   * 128* 130*   K 3.9 3.3* 4.7   CL 90* 93* 94*   CO2 28 24 27   BUN 16 19 14   CREATININE 0.8 0.7 0.9     Imaging:  Chest X-Ray: 2021      Findings:   No consolidation, effusion, pneumothorax. Cardiomegaly. Mediastinal and bilateral chest tubes. No acute fracture.           Impression   1. No acute findings.       This document has been electronically signed by:  Mary Pemberton MD on 2021 02:03 AM         Intake/Output Summary (Last 24 hours) at 2021 9569  Last data filed at 2021 0600  Gross per 24 hour   Intake 780 ml   Output 3105 ml   Net -2325 ml       Scheduled Meds:    colchicine  0.6 mg Oral Daily    metoprolol tartrate  25 mg Oral BID    guaiFENesin  600 mg Oral BID    clopidogrel  75 mg Oral Daily    furosemide  20 mg Oral BID    aspirin  81 mg Oral Daily    magnesium citrate  296 mL Oral Once    pantoprazole  40 mg Oral QAM AC    potassium (CARDIAC) replacement protocol   Other RX Placeholder    sodium chloride flush  10 mL Intravenous 2 times per day    enoxaparin  40 mg Subcutaneous Daily    multivitamin  1 tablet Oral Daily with breakfast    bisacodyl  10 mg Oral BID    atorvastatin  20 mg Oral Nightly     ROS: All neg unless specifically mentioned in subjective section. Exam:  General Appearance: alert ,conversing, in no acute distress  Cardiovascular: normal rate, regular rhythm, normal S1 and S2, no murmurs, rubs, clicks, or gallops  Pulmonary/Chest: clear to auscultation bilaterally- no wheezes, rales or rhonchi, normal air movement, no respiratory distress. Chest tubes were removed  Neurological: alert, oriented, normal speech, no focal findings or movement disorder noted  Sternum: Incision healing appropriately and no wound dehiscence noted. Assessment:   Patient Active Problem List   Diagnosis    Ac cerebral infarction w/ ischemia ([address]    Hypertension    Tobacco abuse    Weakness of left lower extremity    Stroke ([address]    Multiple vessel coronary artery disease    CAD, multiple vessel     Plan:   1. CXR reviewed- Continue daily CXR's   2. Chest tubes removed today.       The plan of care was discussed in detail with Dr. Maria Luisa Santana     Electronically signed by Finn Hoover PA-C on 8/9/2021 at 8:12 AM

## 2021-08-10 ENCOUNTER — TELEPHONE (OUTPATIENT)
Dept: FAMILY MEDICINE CLINIC | Age: 51
End: 2021-08-10

## 2021-08-10 LAB
CALCIUM IONIZED SERUM: 1 MMOL/L (ref 1.12–1.32)
GLUCOSE, WHOLE BLOOD: 144 MG/DL (ref 70–108)
POTASSIUM, WHOLE BLOOD: 5.5 MEQ/L (ref 3.5–4.9)
SODIUM, WHOLE BLOOD: 128 MEQ/L (ref 138–146)

## 2021-08-10 NOTE — CARE COORDINATION
8/10/21, 9:27 AM EDT    Patient goals/plan/ treatment preferences discussed by  and . Patient goals/plan/ treatment preferences reviewed with patient/ family. Patient/ family verbalize understanding of discharge plan and are in agreement with goal/plan/treatment preferences. Understanding was demonstrated using the teach back method. AVS provided by RN at time of discharge, which includes all necessary medical information pertaining to the patients current course of illness, treatment, post-discharge goals of care, and treatment preferences. Pt discharged home yesterday with family and new [facility] for RN. Staff notified [facility] of discharge.

## 2021-08-10 NOTE — TELEPHONE ENCOUNTER
Jessica 45 Transitions Initial Follow Up Call    Outreach made within 2 business days of discharge: Yes    Patient: Vaishali Menendez Patient : 1970   MRN: [id number]  Reason for Admission: There are no discharge diagnoses documented for the most recent discharge. Discharge Date: 21       Spoke with: patient    Discharge department/facility: [facility]    TCM Interactive Patient Contact:  Was patient able to fill all prescriptions: Yes  Was patient instructed to bring all medications to the follow-up visit: Yes  Is patient taking all medications as directed in the discharge summary?  Yes  Does patient understand their discharge instructions: Yes  Does patient have questions or concerns that need addressed prior to 7-14 day follow up office visit: no    Scheduled appointment with PCP within 7-14 days    Follow Up  Future Appointments   Date Time Provider Radha Becerra   2021  8:40 AM Von Dellen, APRN - CNP SRPX BLUFF [facility] [place]   2021 11:15 AM HESHAM Terry CT/CV [facility] [place]   2021 10:30 AM MD GRETEL Avila [facility] [place]   2021  9:00 AM Von Dellen, APRN - CNP SRPX BLUFF [facility] - [address]

## 2021-08-11 ENCOUNTER — OFFICE VISIT (OUTPATIENT)
Dept: FAMILY MEDICINE CLINIC | Age: 51
End: 2021-08-11
Payer: COMMERCIAL

## 2021-08-11 VITALS
SYSTOLIC BLOOD PRESSURE: 136 MMHG | RESPIRATION RATE: 16 BRPM | OXYGEN SATURATION: 99 % | HEART RATE: 110 BPM | WEIGHT: 158.2 LBS | BODY MASS INDEX: 22.7 KG/M2 | DIASTOLIC BLOOD PRESSURE: 74 MMHG

## 2021-08-11 DIAGNOSIS — Z48.02 VISIT FOR SUTURE REMOVAL: Primary | ICD-10-CM

## 2021-08-11 PROCEDURE — 99213 OFFICE O/P EST LOW 20 MIN: CPT | Performed by: NURSE PRACTITIONER

## 2021-08-11 NOTE — PROGRESS NOTES
CLINICAL PHARMACY NOTE: MEDS TO BEDS    Total # of Prescriptions Filled: 9   The following medications were delivered to the patient:  Furosemide 20mg  Clopidogrel 75mg  Lisinopril 5mg  Atorvastatin 40mg  Metoprolol Tartrate 25mg  ASA 81mg  Potassium Chloride Isa ER 20meq  MVI  Oxycodone 5mg    Additional Documentation:

## 2021-08-11 NOTE — PROGRESS NOTES
[address] [address]  Dept: [phone]  Loc: [address] [place] [address] (:  1970) is a 46 y.o. male,Established patient, here for evaluation of the following chief complaint(s):  Suture / Staple Removal      ASSESSMENT/PLAN:  1. Visit for suture removal    4 sutures removed from chest tube sites, two of the four needed reinforcement with steri strips, covered with ABD pad. Patient instructed to avoid getting them wet for 2 days, monitor the site for bleeding. Call Friday with an update on how they are doing. Notify us sooner if any questions or concerns. Return in about 1 week (around 2021), or if symptoms worsen or fail to improve. SUBJECTIVE/OBJECTIVE:  Patient here to have chest tube sutures removed, hospital discharge papers state to remove on 21    He states he is feeling great, much more energy and stanima. has a past medical history of Acid reflux, Arthritis, CAD (coronary artery disease), Chronic back pain, Essential hypertension, and Stroke ([facility] [address] Review of Systems   Constitutional: Negative for activity change, fatigue and fever. Skin: Positive for wound. Sutures to abdomen from chest tube sites. Sternal incision noted. Physical Exam  Vitals reviewed. Constitutional:       Appearance: Normal appearance. HENT:      Head: Normocephalic and atraumatic. Eyes:      Conjunctiva/sclera: Conjunctivae normal.   Cardiovascular:      Rate and Rhythm: Normal rate. Pulmonary:      Effort: Pulmonary effort is normal.      Breath sounds: Normal breath sounds. Abdominal:      General: Abdomen is flat. Musculoskeletal:      Cervical back: Normal range of motion.    Skin:     Comments: Sternum with surgical incision noted, healing well.     4 sutures removed from upper abdomen, the right side (2 drain sites) where not closed together nicely, so steri strips were applied. Neurological:      Mental Status: He is alert. Psychiatric:         Mood and Affect: Mood normal.         Behavior: Behavior normal.             An electronic signature was used to authenticate this note.     --RACHEL Welsh - CNP

## 2021-08-13 ENCOUNTER — TELEPHONE (OUTPATIENT)
Dept: FAMILY MEDICINE CLINIC | Age: 51
End: 2021-08-13

## 2021-08-13 NOTE — TELEPHONE ENCOUNTER
Most of the data that I have seen for aloe is for burn care. I do not think it would be likely to be harmful that he can use if desired, but keeping incisions clean and covered is probably all that is necessary. Let me know if any additional questions or concerns. Report and drainage, redness, or sign of infection promptly. Follow-up as planned.

## 2021-08-19 ENCOUNTER — OFFICE VISIT (OUTPATIENT)
Dept: FAMILY MEDICINE CLINIC | Age: 51
End: 2021-08-19
Payer: COMMERCIAL

## 2021-08-19 VITALS
BODY MASS INDEX: 21.9 KG/M2 | OXYGEN SATURATION: 97 % | HEART RATE: 89 BPM | WEIGHT: 152.6 LBS | TEMPERATURE: 97.3 F | RESPIRATION RATE: 16 BRPM | DIASTOLIC BLOOD PRESSURE: 70 MMHG | SYSTOLIC BLOOD PRESSURE: 112 MMHG

## 2021-08-19 DIAGNOSIS — I10 HYPERTENSION, UNSPECIFIED TYPE: ICD-10-CM

## 2021-08-19 DIAGNOSIS — I25.10 MULTIPLE VESSEL CORONARY ARTERY DISEASE: Primary | ICD-10-CM

## 2021-08-19 DIAGNOSIS — S90.412A ABRASION OF SKIN OF LEFT GREAT TOE: ICD-10-CM

## 2021-08-19 DIAGNOSIS — Z72.0 TOBACCO ABUSE: ICD-10-CM

## 2021-08-19 DIAGNOSIS — Z98.890 HISTORY OF OPEN HEART SURGERY: ICD-10-CM

## 2021-08-19 DIAGNOSIS — I25.10 CAD, MULTIPLE VESSEL: ICD-10-CM

## 2021-08-19 PROCEDURE — 99214 OFFICE O/P EST MOD 30 MIN: CPT | Performed by: NURSE PRACTITIONER

## 2021-08-19 PROCEDURE — 1111F DSCHRG MED/CURRENT MED MERGE: CPT | Performed by: NURSE PRACTITIONER

## 2021-08-19 PROCEDURE — 36415 COLL VENOUS BLD VENIPUNCTURE: CPT | Performed by: NURSE PRACTITIONER

## 2021-08-19 ASSESSMENT — ENCOUNTER SYMPTOMS
TROUBLE SWALLOWING: 0
EYE ITCHING: 0
CONSTIPATION: 0
ABDOMINAL PAIN: 0
EYE DISCHARGE: 0
SORE THROAT: 0
SHORTNESS OF BREATH: 0
EYE REDNESS: 0
CHEST TIGHTNESS: 0
ABDOMINAL DISTENTION: 0
FACIAL SWELLING: 0
SINUS PRESSURE: 0
NAUSEA: 0
WHEEZING: 0
RECTAL PAIN: 0
BLOOD IN STOOL: 0
COUGH: 0
VOMITING: 0
COLOR CHANGE: 0
SINUS PAIN: 0
DIARRHEA: 0
BACK PAIN: 0
EYE PAIN: 0

## 2021-08-19 NOTE — PROGRESS NOTES
Post-Discharge Transitional Care Management Services or Hospital Follow Up      Susy Sawyer   YOB: 1970    Date of Office Visit:  8/19/2021  Date of Hospital Admission: 8/3/21  Date of Hospital Discharge: 8/9/21  Readmission Risk Score(high >=14%.  Medium >=10%):Readmission Risk Score: 11      Care management risk score Rising risk (score 2-5) and Complex Care (Scores >=6): 0     Non face to face  following discharge, date last encounter closed (first attempt may have been earlier): 8/10/2021 11:40 AM 8/10/2021 11:40 AM    Call initiated 2 business days of discharge: Yes     Patient Active Problem List   Diagnosis    Ac cerebral infarction w/ ischemia ([facility] [address]    Hypertension    Tobacco abuse    Weakness of left lower extremity    Stroke ([facility] [address]    Multiple vessel coronary artery disease    CAD, multiple vessel       No Known Allergies    Medications listed as ordered at the time of discharge from hospital   Yulissa Philippe 198 Medication Instructions JEFFERSON:    Printed on:08/19/21 6513   Medication Information                      aspirin 81 MG EC tablet  Take 1 tablet by mouth daily             atorvastatin (LIPITOR) 40 MG tablet  Take 1 tablet by mouth nightly             clopidogrel (PLAVIX) 75 MG tablet  Take 1 tablet by mouth daily             furosemide (LASIX) 20 MG tablet  Take 1 tablet by mouth daily             lisinopril (PRINIVIL;ZESTRIL) 5 MG tablet  Take 1 tablet by mouth daily             metoprolol tartrate (LOPRESSOR) 25 MG tablet  Take 1 tablet by mouth 2 times daily             Multiple Vitamin (MULTIVITAMIN) TABS tablet  Take 1 tablet by mouth daily (with breakfast)             omeprazole (PRILOSEC) 20 MG delayed release capsule  Take 1 capsule by mouth every morning (before breakfast)             polyethylene glycol (GLYCOLAX) 17 GM/SCOOP powder  Take 17 g by mouth daily as needed             potassium chloride (KLOR-CON M) 20 MEQ extended release tablet  Take 1 tablet by mouth daily                   Medications marked \"taking\" at this time  Outpatient Medications Marked as Taking for the 8/19/21 encounter (Office Visit) with RACHEL Montero CNP   Medication Sig Dispense Refill    aspirin 81 MG EC tablet Take 1 tablet by mouth daily 30 tablet 3    atorvastatin (LIPITOR) 40 MG tablet Take 1 tablet by mouth nightly 30 tablet 1    lisinopril (PRINIVIL;ZESTRIL) 5 MG tablet Take 1 tablet by mouth daily 30 tablet 3    clopidogrel (PLAVIX) 75 MG tablet Take 1 tablet by mouth daily 30 tablet 3    Multiple Vitamin (MULTIVITAMIN) TABS tablet Take 1 tablet by mouth daily (with breakfast) 30 tablet 1    furosemide (LASIX) 20 MG tablet Take 1 tablet by mouth daily 30 tablet 0    potassium chloride (KLOR-CON M) 20 MEQ extended release tablet Take 1 tablet by mouth daily 30 tablet 0    metoprolol tartrate (LOPRESSOR) 25 MG tablet Take 1 tablet by mouth 2 times daily 60 tablet 3    polyethylene glycol (GLYCOLAX) 17 GM/SCOOP powder Take 17 g by mouth daily as needed      omeprazole (PRILOSEC) 20 MG delayed release capsule Take 1 capsule by mouth every morning (before breakfast) 90 capsule 1        Medications patient taking as of now reconciled against medications ordered at time of hospital discharge: Yes    Chief Complaint   Patient presents with    Follow-Up from Hospital       Following up from hospital. Had Open heart surgery. Feeling  Good, does feel a little tired today, has been pushing himself. Chest wound measured, and leg wound measured. 31 mm right leg incision. 22 cm chest     Hit left great toe last night, some bleeding noted. Right calf with skin tear, bumped into a dresser last night. Inpatient course: Discharge summary reviewed- see chart. Interval history/Current status: stable    Review of Systems   Constitutional: Negative for activity change, appetite change, fatigue, fever and unexpected weight change.    HENT: Negative for congestion, dental problem, ear pain, facial swelling, mouth sores, postnasal drip, sinus pressure, sinus pain, sore throat and trouble swallowing. Eyes: Negative for pain, discharge, redness, itching and visual disturbance. Respiratory: Negative for cough, chest tightness, shortness of breath and wheezing. Cardiovascular: Negative for chest pain, palpitations and leg swelling. Gastrointestinal: Negative for abdominal distention, abdominal pain, blood in stool, constipation, diarrhea, nausea, rectal pain and vomiting. Endocrine: Negative for cold intolerance and heat intolerance. Genitourinary: Negative for difficulty urinating, dysuria, frequency, hematuria, penile pain, scrotal swelling, testicular pain and urgency. Musculoskeletal: Negative for arthralgias, back pain, gait problem and neck pain. Skin: Positive for wound (surgical wounds to chest and righ leg). Negative for color change and rash. Neurological: Negative for dizziness, seizures, weakness, light-headedness, numbness and headaches. Psychiatric/Behavioral: Negative for agitation and sleep disturbance. Vitals:    08/19/21 0840   BP: 112/70   Site: Right Upper Arm   Pulse: 89   Resp: 16   Temp: 97.3 °F (36.3 °C)   TempSrc: Skin   SpO2: 97%   Weight: 152 lb 9.6 oz (69.2 kg)     Body mass index is 21.9 kg/m². Wt Readings from Last 3 Encounters:   08/19/21 152 lb 9.6 oz (69.2 kg)   08/11/21 158 lb 3.2 oz (71.8 kg)   08/08/21 160 lb 8 oz (72.8 kg)     BP Readings from Last 3 Encounters:   08/19/21 112/70   08/11/21 136/74   08/09/21 130/66       Physical Exam  Vitals and nursing note reviewed. Constitutional:       General: He is not in acute distress. Appearance: He is well-developed. He is not diaphoretic. HENT:      Head: Normocephalic and atraumatic. Right Ear: Tympanic membrane and external ear normal. Tympanic membrane is not injected or erythematous.       Left Ear: Tympanic membrane and external ear normal. Tympanic membrane is not injected or erythematous. Nose: Nose normal.      Mouth/Throat:      Pharynx: Uvula midline. Eyes:      General:         Right eye: No discharge. Left eye: No discharge. Conjunctiva/sclera: Conjunctivae normal.      Pupils: Pupils are equal, round, and reactive to light. Neck:      Thyroid: No thyromegaly. Trachea: Trachea normal.   Cardiovascular:      Rate and Rhythm: Normal rate and regular rhythm. Pulses: Normal pulses. Heart sounds: Normal heart sounds, S1 normal and S2 normal. No murmur heard. No friction rub. No gallop. Pulmonary:      Effort: Pulmonary effort is normal. No respiratory distress. Breath sounds: Normal breath sounds. No wheezing or rales. Chest:      Chest wall: No tenderness. Abdominal:      General: Bowel sounds are normal. There is no distension. Palpations: Abdomen is soft. There is no mass. Tenderness: There is no abdominal tenderness. There is no guarding or rebound. Musculoskeletal:         General: No tenderness or deformity. Normal range of motion. Cervical back: Full passive range of motion without pain, normal range of motion and neck supple. Feet:    Feet:      Left foot:      Toenail Condition: Left toenails are abnormally thick. Lymphadenopathy:      Cervical: No cervical adenopathy. Skin:     General: Skin is warm and dry. Capillary Refill: Capillary refill takes less than 2 seconds. Neurological:      Mental Status: He is alert and oriented to person, place, and time. Deep Tendon Reflexes: Reflexes are normal and symmetric. Assessment/Plan:  1. Multiple vessel coronary artery disease  [facility] on 8/3/21  - NE DISCHARGE MEDS RECONCILED W/ CURRENT OUTPATIENT MED LIST    2. CAD, multiple vessel  [facility] on 8/3/21  - CBC  - Basic Metabolic Panel    3. History of open heart surgery  Doing well, follows with CT on 8/30.    Surgical incisions healing well, no sign of infection noted. 4. Abrasion of skin of left great toe  Areas cleansed, cauterized with silver nitrate stick. Coagulation achieved. Covered with non stick bandage. 5. Hypertension, unspecified type  Controlled. 112/70  Patient reminded and encouraged to make the necessary lifestyle modifications if appropriate: Weight loss, DASH diet, Reduce sodium, Increase physical activity, Moderate Alcohol consumption. 6. Tobacco abuse  Patient is still smoking unfortunately, he states the Chantix is back ordered, I offered to send a new Rx to a different pharmacy, he declined the offer.            Medical Decision Making: low complexity

## 2021-08-20 ENCOUNTER — HOSPITAL ENCOUNTER (OUTPATIENT)
Dept: GENERAL RADIOLOGY | Age: 51
Discharge: HOME | End: 2021-08-20
Payer: COMMERCIAL

## 2021-08-20 ENCOUNTER — HOSPITAL ENCOUNTER (OUTPATIENT)
Age: 51
Discharge: HOME | End: 2021-08-20
Payer: COMMERCIAL

## 2021-08-20 DIAGNOSIS — I25.10 CAD, MULTIPLE VESSEL: ICD-10-CM

## 2021-08-20 LAB
ANION GAP SERPL CALCULATED.3IONS-SCNC: 12 MEQ/L (ref 8–16)
BUN BLDV-MCNC: 9 MG/DL (ref 7–22)
CALCIUM SERPL-MCNC: 9.9 MG/DL (ref 8.5–10.5)
CHLORIDE BLD-SCNC: 90 MEQ/L (ref 98–111)
CO2: 25 MEQ/L (ref 23–33)
CREAT SERPL-MCNC: 0.8 MG/DL (ref 0.4–1.2)
ERYTHROCYTE [DISTWIDTH] IN BLOOD BY AUTOMATED COUNT: 14.9 % (ref 11.5–14.5)
ERYTHROCYTE [DISTWIDTH] IN BLOOD BY AUTOMATED COUNT: 56.6 FL (ref 35–45)
GFR SERPL CREATININE-BSD FRML MDRD: > 90 ML/MIN/1.73M2
GLUCOSE BLD-MCNC: 192 MG/DL (ref 70–108)
HCT VFR BLD CALC: 31.8 % (ref 42–52)
HEMOGLOBIN: 10.4 GM/DL (ref 14–18)
MCH RBC QN AUTO: 33.8 PG (ref 26–33)
MCHC RBC AUTO-ENTMCNC: 32.7 GM/DL (ref 32.2–35.5)
MCV RBC AUTO: 103.2 FL (ref 80–94)
PLATELET # BLD: 625 THOU/MM3 (ref 130–400)
PMV BLD AUTO: 9.2 FL (ref 9.4–12.4)
POTASSIUM SERPL-SCNC: 5.3 MEQ/L (ref 3.5–5.2)
RBC # BLD: 3.08 MILL/MM3 (ref 4.7–6.1)
SODIUM BLD-SCNC: 127 MEQ/L (ref 135–145)
WBC # BLD: 6.5 THOU/MM3 (ref 4.8–10.8)

## 2021-08-20 PROCEDURE — 71046 X-RAY EXAM CHEST 2 VIEWS: CPT

## 2021-08-23 ENCOUNTER — TELEPHONE (OUTPATIENT)
Dept: CARDIOTHORACIC SURGERY | Age: 51
End: 2021-08-23

## 2021-08-23 NOTE — TELEPHONE ENCOUNTER
----- Message from Franki Murphy PA-C sent at 8/20/2021  3:06 PM EDT -----  Inform patient to stop potassium  Thank you. Continue all other medications.

## 2021-08-23 NOTE — TELEPHONE ENCOUNTER
Call out to patient - Informed him to d/c potassium chloride due to elevated levels. He voiced understanding.

## 2021-08-30 ENCOUNTER — OFFICE VISIT (OUTPATIENT)
Dept: CARDIOTHORACIC SURGERY | Age: 51
End: 2021-08-30

## 2021-08-30 VITALS
WEIGHT: 156 LBS | SYSTOLIC BLOOD PRESSURE: 122 MMHG | DIASTOLIC BLOOD PRESSURE: 72 MMHG | HEIGHT: 70 IN | HEART RATE: 91 BPM | BODY MASS INDEX: 22.33 KG/M2

## 2021-08-30 DIAGNOSIS — Z95.1 S/P CABG (CORONARY ARTERY BYPASS GRAFT): Primary | ICD-10-CM

## 2021-08-30 PROCEDURE — 99024 POSTOP FOLLOW-UP VISIT: CPT | Performed by: PHYSICIAN ASSISTANT

## 2021-08-30 NOTE — PROGRESS NOTES
CT/CV Surgery Follow Up Office Visit      Patient's Name/Date of Birth: Vaishali Menendez / 1970 (4 y.o.)    CC:   Chief Complaint   Patient presents with    Post-Op Check     CABG 8/3/21    Other     CXR 8/20/21, labs 8/19/21      PCP: RACHEL Rivera - CNP    Date: August 30, 2021    We had the pleasure of seeing Vaishali Menendez in the office today, as you know this is a very pleasant 46y.o. year old male with a history of CAD with CABG 8/3/21. The pt denies chest pressure, SOB, fever, chills, N/V/D. CXR PA & LATERAL: I have reviewed the CXR films. PastMedical History:  Shabnam Torres  has a past medical history of Acid reflux, Arthritis, CAD (coronary artery disease), Chronic back pain, Essential hypertension, and Stroke ([facility] [address] Past Surgical History:  The patient  has a past surgical history that includes Back Injection (06/26/2021); Cardiac catheterization (07/07/2021); and Coronary artery bypass graft (N/A, 8/3/2021). Allergies: The patient has No Known Allergies. Medications:  Prior to Admission medications    Medication Sig Start Date End Date Taking?  Authorizing Provider   aspirin 81 MG EC tablet Take 1 tablet by mouth daily 8/9/21  Yes Inna Leggett PA-C   atorvastatin (LIPITOR) 40 MG tablet Take 1 tablet by mouth nightly 8/9/21  Yes Inna Leggett PA-C   lisinopril (PRINIVIL;ZESTRIL) 5 MG tablet Take 1 tablet by mouth daily 8/9/21  Yes Inna Leggett PA-C   clopidogrel (PLAVIX) 75 MG tablet Take 1 tablet by mouth daily 8/9/21  Yes Inna Leggett PA-C   Multiple Vitamin (MULTIVITAMIN) TABS tablet Take 1 tablet by mouth daily (with breakfast) 8/9/21  Yes Inna Leggett PA-C   metoprolol tartrate (LOPRESSOR) 25 MG tablet Take 1 tablet by mouth 2 times daily 8/9/21  Yes Nichole Vázquez PA-C   polyethylene glycol (GLYCOLAX) 17 GM/SCOOP powder Take 17 g by mouth daily as needed   Yes Historical Provider, MD   omeprazole (PRILOSEC) 20 MG delayed release capsule Take 1 capsule by mouth every morning (before bilaterally   Incisions: Clean, dry, and intact. Sternum with no dehiscence. Labs:    CBC:  Lab Results   Component Value Date    WBC 6.5 08/19/2021    HGB 10.4 08/19/2021    HCT 31.8 08/19/2021    .2 08/19/2021     08/19/2021    INR 0.93 07/23/2021     BMP:   Lab Results   Component Value Date     08/19/2021    K 5.3 08/19/2021    K 5.2 07/07/2021    CL 90 08/19/2021    CO2 25 08/19/2021    BUN 9 08/19/2021    CREATININE 0.8 08/19/2021    MG 2.5 08/04/2021     Active Problem List  Patient Active Problem List   Diagnosis    Ac cerebral infarction w/ ischemia (HCC)    Hypertension    Tobacco abuse    Weakness of left lower extremity    Stroke ([facility] [address]    Multiple vessel coronary artery disease    CAD, multiple vessel     Assessment:  1. CAD  -  S/P CABG 8/3/21    Plan 8/30/21:  1) Continue current medical therapy. D/c'd Potassium and Lasix. 2) CXR and labs reviewed. 3)Sternal precautions are still in place for 2 full months postop with no heavy lifting, but they are cleared to start driving locally. 4) Rediscussed cardiac rehab. 5) Follow up office visit prn    Thank you for allowing us to be involved in the patient's care.     Electronically by Addy Stroud PA-C  on 8/30/2021 at 12:19 PM

## 2021-09-07 DIAGNOSIS — K21.9 GASTROESOPHAGEAL REFLUX DISEASE, UNSPECIFIED WHETHER ESOPHAGITIS PRESENT: ICD-10-CM

## 2021-09-07 RX ORDER — OMEPRAZOLE 20 MG/1
20 CAPSULE, DELAYED RELEASE ORAL
Qty: 90 CAPSULE | Refills: 1 | Status: SHIPPED | OUTPATIENT
Start: 2021-09-07 | End: 2021-09-09

## 2021-09-07 NOTE — TELEPHONE ENCOUNTER
Patient's last appointment was : 8/19/2021  Patient's next appointment is : 11/22/2021  Last refilled:  6/30/21

## 2021-09-09 ENCOUNTER — OFFICE VISIT (OUTPATIENT)
Dept: FAMILY MEDICINE CLINIC | Age: 51
End: 2021-09-09
Payer: COMMERCIAL

## 2021-09-09 VITALS
HEART RATE: 90 BPM | DIASTOLIC BLOOD PRESSURE: 92 MMHG | OXYGEN SATURATION: 100 % | SYSTOLIC BLOOD PRESSURE: 160 MMHG | BODY MASS INDEX: 22.21 KG/M2 | TEMPERATURE: 98.2 F | RESPIRATION RATE: 16 BRPM | WEIGHT: 154.8 LBS

## 2021-09-09 DIAGNOSIS — H26.9 CATARACT OF RIGHT EYE, UNSPECIFIED CATARACT TYPE: Primary | ICD-10-CM

## 2021-09-09 DIAGNOSIS — I25.10 MULTIPLE VESSEL CORONARY ARTERY DISEASE: ICD-10-CM

## 2021-09-09 DIAGNOSIS — B37.0 ORAL THRUSH: ICD-10-CM

## 2021-09-09 DIAGNOSIS — K21.9 GASTROESOPHAGEAL REFLUX DISEASE, UNSPECIFIED WHETHER ESOPHAGITIS PRESENT: ICD-10-CM

## 2021-09-09 PROCEDURE — 99214 OFFICE O/P EST MOD 30 MIN: CPT | Performed by: NURSE PRACTITIONER

## 2021-09-09 RX ORDER — OMEPRAZOLE 20 MG/1
40 CAPSULE, DELAYED RELEASE ORAL DAILY
Qty: 60 CAPSULE | Refills: 5 | Status: SHIPPED | OUTPATIENT
Start: 2021-09-09 | End: 2021-12-10 | Stop reason: SDUPTHER

## 2021-09-09 RX ORDER — FLUCONAZOLE 100 MG/1
100 TABLET ORAL DAILY
Qty: 12 TABLET | Refills: 0 | Status: SHIPPED | OUTPATIENT
Start: 2021-09-09 | End: 2021-09-20

## 2021-09-09 ASSESSMENT — ENCOUNTER SYMPTOMS
ABDOMINAL PAIN: 1
SHORTNESS OF BREATH: 0
COLOR CHANGE: 0
COUGH: 0

## 2021-09-09 NOTE — PROGRESS NOTES
[address] [address]  Dept: [phone]  Loc: [address] [facility] (:  1970) is a 46 y.o. male,Established patient, here for evaluation of the following chief complaint(s):  Referral - General (cataract), Abdominal Pain (hurts to swallow medication is not hepling ), and Pharyngitis      ASSESSMENT/PLAN:  1. Cataract of right eye, unspecified cataract type  -     [facility] - Karyn Steele MD, Opthalmology, [facility] [place]  2. Gastroesophageal reflux disease, unspecified whether esophagitis present  -     [facility] - Gwendolyn Padilla MD, Gastroenterology, [facility] [place]  -     omeprazole (PRILOSEC) 20 MG delayed release capsule; Take 2 capsules by mouth Daily, Disp-60 capsule, R-5Normal  3. Oral thrush  -     fluconazole (DIFLUCAN) 100 MG tablet; Take 1 tablet by mouth daily for 11 days Day one take 2 tabs, then 1 tab daily for 10 days, Disp-12 tablet, R-0Normal  4. Multiple vessel coronary artery disease  S/p CABG, he saw Jeanie Tiara  and misunderstood about stoppping his medications, so he has not taken anything for over a week, reviewed at length to resume all medications other than Lasix and potassium, he voiced understanding. Says Cardiology rehab is not approved with insurance. .. 200 Out of pocket per visit. Return in about 3 months (around 2021). SUBJECTIVE/OBJECTIVE:    He misunderstood [employer] instructions and stopped all meds on   Agrees to resume them today. Has been told in the past he has catarac in right eye, need a referral     Omeprazole ran out, has not been taking, finally got picked up Tuesday. Reflux has been worse, complains of burning in mouth with anything he drinks. has a past medical history of Acid reflux, Arthritis, CAD (coronary artery disease), Chronic back pain, Essential hypertension, and Stroke ([address]     Review of Systems   Constitutional: Negative for chills, diaphoresis, fatigue and fever. HENT:        Tongue pain      Eyes: Positive for visual disturbance (cloudy vision in right eye). Respiratory: Negative for cough and shortness of breath. Gastrointestinal: Positive for abdominal pain (reflux). Skin: Negative for color change and rash. Physical Exam  Vitals and nursing note reviewed. Constitutional:       General: He is not in acute distress. Appearance: Normal appearance. He is well-developed. He is not diaphoretic. HENT:      Head: Normocephalic and atraumatic. Right Ear: Tympanic membrane and external ear normal. Tympanic membrane is not injected or erythematous. Left Ear: Tympanic membrane and external ear normal. Tympanic membrane is not injected or erythematous. Nose: Nose normal.      Mouth/Throat:      Pharynx: Uvula midline. No oropharyngeal exudate or posterior oropharyngeal erythema. Eyes:      General:         Right eye: No discharge. Left eye: No discharge. Conjunctiva/sclera: Conjunctivae normal.      Pupils: Pupils are equal, round, and reactive to light. Neck:      Thyroid: No thyromegaly. Trachea: Trachea normal.   Cardiovascular:      Rate and Rhythm: Normal rate and regular rhythm. Heart sounds: S1 normal and S2 normal.   Pulmonary:      Effort: Pulmonary effort is normal. No respiratory distress. Breath sounds: Normal breath sounds. No wheezing or rales. Chest:      Chest wall: No tenderness. Abdominal:      General: Bowel sounds are normal. There is no distension. Palpations: Abdomen is soft. There is no mass. Tenderness: There is no abdominal tenderness. There is no guarding or rebound. Musculoskeletal:         General: No tenderness or deformity. Normal range of motion. Cervical back: Full passive range of motion without pain, normal range of motion and neck supple. Lymphadenopathy:      Cervical: No cervical adenopathy. Skin:     General: Skin is warm and dry. Capillary Refill: Capillary refill takes less than 2 seconds. Neurological:      Mental Status: He is alert and oriented to person, place, and time. Deep Tendon Reflexes: Reflexes are normal and symmetric. Psychiatric:         Mood and Affect: Mood normal.         Behavior: Behavior normal.             An electronic signature was used to authenticate this note.     --RACHEL Eugene - CNP

## 2021-10-07 RX ORDER — CLOPIDOGREL BISULFATE 75 MG/1
TABLET ORAL
Qty: 30 TABLET | Refills: 2 | Status: SHIPPED | OUTPATIENT
Start: 2021-10-07 | End: 2021-12-10 | Stop reason: SDUPTHER

## 2021-10-11 ENCOUNTER — PATIENT MESSAGE (OUTPATIENT)
Dept: FAMILY MEDICINE CLINIC | Age: 51
End: 2021-10-11

## 2021-10-11 DIAGNOSIS — R73.9 BLOOD GLUCOSE ELEVATED: Primary | ICD-10-CM

## 2021-10-12 ENCOUNTER — TELEPHONE (OUTPATIENT)
Dept: FAMILY MEDICINE CLINIC | Age: 51
End: 2021-10-12

## 2021-10-12 NOTE — TELEPHONE ENCOUNTER
----- Message from RACHEL Gomez CNP sent at 10/11/2021 10:45 AM EDT -----  Please let patient know he needs to come In for an A1C. Can you order and I will co sign.    Reason for test is elevated glucose

## 2021-10-25 NOTE — TELEPHONE ENCOUNTER
Called patient- will discuss with Angelina at follow up- is feeling pretty discouraged with the healthcare system at this time

## 2021-10-26 ENCOUNTER — TELEPHONE (OUTPATIENT)
Dept: FAMILY MEDICINE CLINIC | Age: 51
End: 2021-10-26

## 2021-10-26 NOTE — TELEPHONE ENCOUNTER
Patient called in wanting most recent a1c sent to Dr. Leary Failing office. Patient last a1c completed by our office was April 2021. A1c was ordered 10/11/21 but has not been completed. Patient needs a1c done. Dr. Leary Failing fax number [phone]    Left message for patient to return phone call.

## 2021-11-09 NOTE — TELEPHONE ENCOUNTER
Patient returned call, verbalized understanding of new arrival time.  Patient will arrive on 7/07/21 at 5:30 am. Physical Therapy     Referred by: Mitch David MD; Medical Diagnosis (from order):    Diagnosis Information      Diagnosis    715.16 (ICD-9-CM) - M17.0 (ICD-10-CM) - Primary osteoarthritis of both knees                Daily Treatment Note    Visit:  5     SUBJECTIVE                                                                                                               Doing a little better in general. Trying to do some of the home exercises, but knows she is not doing enough of them. Depression still takes her energy.  Functional Change: Pain continues to fluctuate and causes problems at times with daily activities. Does note that the small step into her house is getting easier.   Pain / Symptoms:  Pain rating (out of 10): Current: 3     OBJECTIVE                                                                                                                                  TREATMENT                                                                                                                  Therapeutic Exercise:  Scifit 7 minutes level 2 Seat 13    Seated edge of mat table:  Knee extension with manual resistance x 10 ea side  Knee flexion with manual resistance x 10 ea side  Marching with manual resistance x 10 ea side    In parallel bars:  Alternating marches x 10 ea side  Heel raises x 8 ea side  Step ups forward 6 inch x 10 right; x 10 left  Sidestepping x 4 lengths, cues to increase step length and bring feet back together.     Neuromuscular Re-Education:  1 foot up on step modified single leg balance x 60 sec per stance  Alternating tap ups to 6 inch step with no UE support x 10 ea side, as fast a pace as possible          Skilled input: verbal instruction/cues and tactile instruction/cues    Writer verbally educated and received verbal consent for hand placement, positioning of patient, and techniques to be performed today from patient for clothing adjustments for techniques, therapist position for  techniques and hand placement and palpation for techniques as described above and how they are pertinent to the patient's plan of care.    Home Exercise Program/Education Materials: Access Code: [email or web address]  URL: [email or web address]  Date: 10/20/2021  Prepared by: Severiano Owusu    Exercises  Sit to Stand - 1 x daily - 7 x weekly - 2 sets - 15 reps  Seated Knee Extension with Resistance - 1 x daily - 7 x weekly - 2 sets - 15 reps  Small Range Straight Leg Raise - 1 x daily - 7 x weekly - 2 sets - 15 reps         ASSESSMENT                                                                                                             Fern reported significant fatigue throughout session and needed multiple rest breaks. She does admit to low motivation, which limits her exercise tolerance. Stated that she was fatigued following session with slight increase in soreness.     Pain/symptoms after session (out of 10): 4    Patient Education:   Results of above outlined education: Verbalizes understanding and Needs reinforcement      PLAN                                                                                                                           Suggestions for next session as indicated: Progress per plan of care, continue progressive loading as tolerated, continue leg strengthening and positive reinforcement.           Therapy procedure time and total treatment time can be found documented on the Time Entry flowsheet

## 2021-11-22 ENCOUNTER — OFFICE VISIT (OUTPATIENT)
Dept: FAMILY MEDICINE CLINIC | Age: 51
End: 2021-11-22
Payer: COMMERCIAL

## 2021-11-22 VITALS
DIASTOLIC BLOOD PRESSURE: 120 MMHG | WEIGHT: 155.8 LBS | TEMPERATURE: 98.2 F | RESPIRATION RATE: 16 BRPM | BODY MASS INDEX: 22.35 KG/M2 | OXYGEN SATURATION: 98 % | SYSTOLIC BLOOD PRESSURE: 202 MMHG | HEART RATE: 88 BPM

## 2021-11-22 DIAGNOSIS — I10 UNCONTROLLED HYPERTENSION: Primary | ICD-10-CM

## 2021-11-22 DIAGNOSIS — R73.03 PRE-DIABETES: ICD-10-CM

## 2021-11-22 PROCEDURE — 99214 OFFICE O/P EST MOD 30 MIN: CPT | Performed by: NURSE PRACTITIONER

## 2021-11-22 RX ORDER — LISINOPRIL 20 MG/1
20 TABLET ORAL DAILY
Qty: 90 TABLET | Refills: 1 | Status: SHIPPED | OUTPATIENT
Start: 2021-11-22 | End: 2021-12-10 | Stop reason: SDUPTHER

## 2021-11-22 RX ORDER — PREDNISOLONE ACETATE 10 MG/ML
SUSPENSION/ DROPS OPHTHALMIC
COMMUNITY
Start: 2021-11-01

## 2021-11-22 RX ORDER — OFLOXACIN 3 MG/ML
SOLUTION/ DROPS OPHTHALMIC
COMMUNITY
Start: 2021-11-01 | End: 2021-11-22

## 2021-11-22 ASSESSMENT — ENCOUNTER SYMPTOMS
NAUSEA: 0
SHORTNESS OF BREATH: 0
VOMITING: 0
ABDOMINAL PAIN: 0
COUGH: 0
COLOR CHANGE: 0

## 2021-11-22 NOTE — PATIENT INSTRUCTIONS
Take 3 more lisinopril as soon as you get home, to equal 20 mg. Check your blood pressure and heart rate several times a day.    If your SBP+ top greater than 190  Or DBP greater than 90 go to the ER

## 2021-11-22 NOTE — PROGRESS NOTES
[address] [address]  Dept: [phone]  Loc: 222 [facility] (:  1970) is a 46 y.o. male,Established patient, here for evaluation of the following chief complaint(s):  Follow-up      ASSESSMENT/PLAN:  1. Uncontrolled hypertension  Blood pressure today is 202/120, I strongly told him he needs to go to the emergency room with his history of stroke and multiple vessel coronary artery disease. Patient is refusing to go to the ER, states he cannot afford it. He does however deny any blurred vision or chest pain  He is currently taking lisinopril 5 mg daily I told him since he is refusing to go to the ER that as soon as he gets home he needs to take 3 more of the 5 mg lisinopril to equal 20 mg, he is to recheck his blood pressure an hour after taking that and call the office and let us know what it is. Again patient was notified of the risk he is taking by not going to the emergency room and voices again that he cannot afford it I did notify him of possible death with his blood pressure being this uncontrolled, he states he does not care if he dies. After seeing this I did talk to him about his mood he denies any SI/HI  -     lisinopril (PRINIVIL;ZESTRIL) 20 MG tablet; Take 1 tablet by mouth daily, Disp-90 tablet, R-1Normal  2. Pre-diabetes  Patient's last A1c was 5.9%    No follow-ups on file. SUBJECTIVE/OBJECTIVE:  Reports he took all medications this am.    Refusing to go to the ER    Had right eye cataract surgery a month ago.        has a past medical history of Acid reflux, Arthritis, CAD (coronary artery disease), Chronic back pain, Essential hypertension, and Stroke ([facility] [address] Review of Systems   Constitutional: Negative for chills, diaphoresis, fatigue and fever. Respiratory: Negative for cough and shortness of breath. Cardiovascular: Negative for chest pain and leg swelling. Gastrointestinal: Negative for abdominal pain, nausea and vomiting. Skin: Negative for color change and rash. Neurological: Negative for dizziness and headaches. Physical Exam  Vitals reviewed. Constitutional:       Appearance: Normal appearance. He is not diaphoretic. HENT:      Head: Normocephalic and atraumatic. Right Ear: External ear normal.      Left Ear: External ear normal.      Nose: Nose normal.      Mouth/Throat:      Pharynx: Oropharynx is clear. Eyes:      Conjunctiva/sclera: Conjunctivae normal.   Cardiovascular:      Rate and Rhythm: Normal rate. Pulmonary:      Effort: Pulmonary effort is normal.      Breath sounds: Wheezing (pt is a chronic smoker) present. Musculoskeletal:      Cervical back: Normal range of motion. Skin:     General: Skin is warm. Neurological:      Mental Status: He is alert and oriented to person, place, and time. Psychiatric:      Comments: Mood irritable. An electronic signature was used to authenticate this note.     --Flaca Magana, RACHEL - CNP

## 2021-12-02 ENCOUNTER — TELEPHONE (OUTPATIENT)
Dept: FAMILY MEDICINE CLINIC | Age: 51
End: 2021-12-02

## 2021-12-02 NOTE — TELEPHONE ENCOUNTER
Patient calling to state that his insurance plan dropped him and as of Dec 8 will not have insurance. Patient is very tearful as he does not know how he will pay for his OV and Rx's. Patient mentioned that he barely has enough money for food- ( we do have a food box set up for him to come on Monday- will give to patient at appointment on 12/7/21.)   Is there anything you can do to help this patient with medications? Would he qualify for medicaid?       Thank you

## 2021-12-03 ENCOUNTER — CARE COORDINATION (OUTPATIENT)
Dept: CARE COORDINATION | Age: 51
End: 2021-12-03

## 2021-12-03 NOTE — TELEPHONE ENCOUNTER
Attempted to reach patient for Care Coordination enrollment. Patient was not available at the time of my call. Will continue to attempt to f/u with patient in the future.

## 2021-12-03 NOTE — CARE COORDINATION
Attempted to reach patient for Care Coordination enrollment s/p recent PCP/office referral for assistance with the management of his healthcare needs. Patient was not available at the time of my call and generic voicemail message was left asking patient to please return call to my direct number. Introduction My Chart message also sent to patient. Will continue to attempt to f/u with patient in the future.

## 2021-12-07 ENCOUNTER — CARE COORDINATION (OUTPATIENT)
Dept: CARE COORDINATION | Age: 51
End: 2021-12-07

## 2021-12-07 ENCOUNTER — OFFICE VISIT (OUTPATIENT)
Dept: FAMILY MEDICINE CLINIC | Age: 51
End: 2021-12-07
Payer: COMMERCIAL

## 2021-12-07 DIAGNOSIS — I25.10 CAD, MULTIPLE VESSEL: ICD-10-CM

## 2021-12-07 DIAGNOSIS — I10 HYPERTENSION, UNSPECIFIED TYPE: Primary | ICD-10-CM

## 2021-12-07 DIAGNOSIS — F41.9 ANXIETY: ICD-10-CM

## 2021-12-07 DIAGNOSIS — Z72.0 TOBACCO ABUSE: ICD-10-CM

## 2021-12-07 DIAGNOSIS — I25.10 MULTIPLE VESSEL CORONARY ARTERY DISEASE: ICD-10-CM

## 2021-12-07 PROCEDURE — 99214 OFFICE O/P EST MOD 30 MIN: CPT | Performed by: NURSE PRACTITIONER

## 2021-12-07 NOTE — PROGRESS NOTES
[address] [address]  Dept: [phone]  Loc: 222 [facility] (:  1970) is a 46 y.o. male,Established patient, here for evaluation of the following chief complaint(s):  No chief complaint on file. ASSESSMENT/PLAN:  1. Hypertension, unspecified type  2. Multiple vessel coronary artery disease  3. CAD, multiple vessel  4. Anxiety  5. Tobacco abuse    Caro Mueller was given Good Rx savings coupons for all of his medications (around 75$ for all medications for a 90 day supply)  Also spoke with Dom Rodríguez and discussed [facility]. He will be able to take advantage of that once his insurance cuts off and until he gets new insurance. All medications will be covered at 100%  Offered him a food box, he refused. Also offered to sit down with him and help sort through his bills and help to fill out financial assistance, he declined. He was encouraged to take all medications as prescribed, and follow up in 3 months or sooner if needed. Return in about 3 months (around 3/7/2022). SUBJECTIVE/OBJECTIVE:  Insurance is cutting him off tomorrow. Due to him not being able to pay bills. His water is being cut off. ALot of his medical bills were not covered by insurance. Here to discuss medications and financial assistance        has a past medical history of Acid reflux, Arthritis, CAD (coronary artery disease), Chronic back pain, Essential hypertension, and Stroke ([address] Review of Systems   Constitutional: Negative for fatigue and fever. Respiratory: Negative for cough and shortness of breath. Psychiatric/Behavioral: Positive for agitation. The patient is nervous/anxious. Physical Exam  Vitals and nursing note reviewed. Constitutional:       General: He is not in acute distress. Appearance: He is well-developed. He is not diaphoretic.    HENT:      Head: Normocephalic and atraumatic. Right Ear: Tympanic membrane and external ear normal. Tympanic membrane is not injected or erythematous. Left Ear: Tympanic membrane and external ear normal. Tympanic membrane is not injected or erythematous. Nose: Nose normal.      Mouth/Throat:      Pharynx: Uvula midline. Eyes:      General:         Right eye: No discharge. Left eye: No discharge. Conjunctiva/sclera: Conjunctivae normal.      Pupils: Pupils are equal, round, and reactive to light. Neck:      Thyroid: No thyromegaly. Trachea: Trachea normal.   Cardiovascular:      Rate and Rhythm: Normal rate and regular rhythm. Pulses: Normal pulses. Heart sounds: Normal heart sounds, S1 normal and S2 normal. No murmur heard. No friction rub. No gallop. Pulmonary:      Effort: Pulmonary effort is normal. No respiratory distress. Breath sounds: Normal breath sounds. No wheezing or rales. Chest:      Chest wall: No tenderness. Abdominal:      General: Bowel sounds are normal. There is no distension. Palpations: Abdomen is soft. There is no mass. Tenderness: There is no abdominal tenderness. There is no guarding or rebound. Musculoskeletal:         General: No tenderness or deformity. Normal range of motion. Cervical back: Full passive range of motion without pain, normal range of motion and neck supple. Lymphadenopathy:      Cervical: No cervical adenopathy. Skin:     General: Skin is warm and dry. Capillary Refill: Capillary refill takes less than 2 seconds. Neurological:      Mental Status: He is alert and oriented to person, place, and time. Deep Tendon Reflexes: Reflexes are normal and symmetric. Psychiatric:         Attention and Perception: Attention normal.         Mood and Affect: Mood is anxious. Affect is tearful. Speech: Speech normal.         Behavior: Behavior is agitated. Thought Content:  Thought content normal. Judgment: Judgment normal.             An electronic signature was used to authenticate this note.     --RACHEL Castro - CNP

## 2021-12-07 NOTE — CARE COORDINATION
Attempted to reach patient for Care Coordination enrollment s/p recent PCP office referral for assistance with the management of his healthcare needs and possible resource challenges. Patient was unavailable at the time of my call, and generic voicemail message was left asking patient to please return call to my direct number. Will continue to attempt to f/u with patient in the future.

## 2021-12-07 NOTE — CARE COORDINATION
PCP called ACM as patient was in the office for an appointment. PCP shared patient underwent OHS earlier this year and has a number of outstanding medical bills as he reports his cardiac rehab was declined by his insurance. Patient stated he has been in contact with [employer] and was told he wasn't eligible for assistance. ACM encouraged PCP to have patient f/u with [employer] for possible medicaid eligibility as well as completing the financial assistance applications on the back of his bills. RUDOLPHM also reached out to [address] [place] [address] and she will mail copy of food pantry/food assistance options to patient as he reports he has no phone availability at this time and there is no other way to contact him. ACM discussed YAIR with PCP and referral also placed to Livan Amezquita with the medication assistance program  to see if patient qualifies. Upon return call PCP stated patient left office declining any assistance at this time. Will continue to attempt to f/u with patient for CC enrollment.

## 2021-12-07 NOTE — CARE COORDINATION
Mailed pt list of food pantries available to him. Mailed to [address] [place] [address]

## 2021-12-10 ENCOUNTER — TELEPHONE (OUTPATIENT)
Dept: FAMILY MEDICINE CLINIC | Age: 51
End: 2021-12-10

## 2021-12-10 DIAGNOSIS — I25.10 CAD, MULTIPLE VESSEL: Primary | ICD-10-CM

## 2021-12-10 DIAGNOSIS — I10 UNCONTROLLED HYPERTENSION: ICD-10-CM

## 2021-12-10 DIAGNOSIS — K21.9 GASTROESOPHAGEAL REFLUX DISEASE, UNSPECIFIED WHETHER ESOPHAGITIS PRESENT: ICD-10-CM

## 2021-12-10 RX ORDER — LISINOPRIL 20 MG/1
20 TABLET ORAL DAILY
Qty: 90 TABLET | Refills: 3 | Status: SHIPPED | OUTPATIENT
Start: 2021-12-10 | End: 2022-03-10

## 2021-12-10 RX ORDER — ASPIRIN 81 MG/1
81 TABLET ORAL DAILY
Qty: 90 TABLET | Refills: 3 | Status: SHIPPED | OUTPATIENT
Start: 2021-12-10 | End: 2022-03-10

## 2021-12-10 RX ORDER — CLOPIDOGREL BISULFATE 75 MG/1
75 TABLET ORAL DAILY
Qty: 90 TABLET | Refills: 3 | Status: SHIPPED | OUTPATIENT
Start: 2021-12-10 | End: 2022-03-10

## 2021-12-10 RX ORDER — ATORVASTATIN CALCIUM 40 MG/1
40 TABLET, FILM COATED ORAL NIGHTLY
Qty: 90 TABLET | Refills: 3 | Status: SHIPPED | OUTPATIENT
Start: 2021-12-10 | End: 2022-03-10

## 2021-12-10 RX ORDER — OMEPRAZOLE 20 MG/1
40 CAPSULE, DELAYED RELEASE ORAL DAILY
Qty: 180 CAPSULE | Refills: 3 | Status: SHIPPED | OUTPATIENT
Start: 2021-12-10 | End: 2022-03-10

## 2021-12-13 VITALS — DIASTOLIC BLOOD PRESSURE: 80 MMHG | SYSTOLIC BLOOD PRESSURE: 149 MMHG

## 2021-12-13 ASSESSMENT — ENCOUNTER SYMPTOMS
SHORTNESS OF BREATH: 0
COUGH: 0

## 2021-12-13 NOTE — TELEPHONE ENCOUNTER
Please close encounter. I will try to reach patient again this week for Care Coordination enrollment. Thank you!

## 2021-12-14 ENCOUNTER — CARE COORDINATION (OUTPATIENT)
Dept: CARE COORDINATION | Age: 51
End: 2021-12-14

## 2021-12-14 NOTE — CARE COORDINATION
Attempted to reach patient for Care Coordination enrollment s/p recent PCP referral for assistance with the management of his healthcare needs. Patient was unavailable at the time of my call, and generic voicemail message was left asking patient to please return call to my direct number. I have been unable to reach patient s/p multiple recent attempts and no further f/u planned. F/u My chart message also sent to patient. PCP updated.

## 2022-09-10 NOTE — PROGRESS NOTES
99 [facility] ICU STEPDOWN TELEMETRY 4K  Occupational Therapy  Daily Note  Time:   Time In: 930  Time Out: 957  Timed Code Treatment Minutes: 27 Minutes  Minutes: 27          Date: 2021  Patient Name: Joe Lemus,   Gender: male      Room: -19/019-A  MRN: [id number]  : 1970  (46 y.o.)  Referring Practitioner: Yifan Diaz MD  Diagnosis: CAD, multiple vessel  Additional Pertinent Hx: 46year old male non-diabetic presents with worsening fatigue and syncopal episodes without chest pain. Murmur detected on physical exam prompted TTE & MARY, essentially showing low normal LVEF and mild AS (peak gradient 22). LHC shows severe multivessel CAD. Patient incidentally reports onset of left-sided weakness after COVID vaccination in February, which has improved. Previous heavy smoker, in process of quitting. Reports drinks approx 8 beers per day. Pt with hx of stroke (L sided residual deficits) is s/p CABG x 3 (DOS: 8/3/21). Restrictions/Precautions:  Restrictions/Precautions: Cardiac, Surgical Protocols, Fall Risk  Position Activity Restriction  Sternal Precautions: No Pushing, No Pulling, 10# Lifting Restrictions  Other position/activity restrictions: Chest tubes. Pt had CVA in 2021 effect L side. SUBJECTIVE: Pt. Nurse approved OT treatment. Pt. Sitting in chair upon arrival in good spirits and agreeable to OT treatment. PAIN: Denies    Vitals: Vitals not assessed per clinical judgement, see nursing flowsheet    COGNITION: WNL    ADL:   No ADL's completed this session. CLARED BALANCE:  Standing Balance: Stand By Assistance, with cues for safety. BED MOBILITY:  Not Tested    TRANSFERS:  Sit to Stand:  Stand By Assistance, cues for hand placement. Stand to Sit: Stand By Assistance. FUNCTIONAL MOBILITY:  Assistive Device: 4 Wheeled Walker  Assist Level:  Stand By Assistance and with verbal cues .    Distance: in [place] [facility] distance with no LOB noted and min fatigue reported       ADDITIONAL ACTIVITIES:  Pt completed CABG Step II exercises x10 reps x1 set this date in order to increase strength and improve activity tolerance for ADLs and homemaking tasks. Pt exhibited min fatigue during task, requring brief rest breaks and 1 VCs for technique. Pt. Instructed on home safety and ECTs in the home upon return to improve [place] and safety with basic ADL components. Pt. Voices good understanding and assistance from his son. ASSESSMENT:     Activity Tolerance:  Patient tolerance of  treatment: fair. Discharge Recommendations: Continue to assess pending progress, 24 hour supervision or assist, Home with Home health OT   Equipment Recommendations: Equipment Needed: No  Plan: Times per week: 6x  Times per day: Daily  Current Treatment Recommendations: Strengthening, Positioning, Pain Management, Safety Education & Training, Balance Training, Self-Care / ADL, Patient/Caregiver Education & Training, Cognitive/Perceptual Training, Functional Mobility Training, Endurance Training    Patient Education  Patient Education: Home Exercise Program, Precautions, Home Safety, Importance of Increasing Activity and Assistive Device Safety    Goals  Short term goals  Time Frame for Short term goals: by discharge  Short term goal 1: Pt will complete CABG step II exercises X10 reps to increase overall strength/endurance needed for ADL tasks. Short term goal 2: pt will complete function transfers CGA with min vc to follow sternal precautions and min vc's for safety  Short term goal 3: Pt will perform functional mobility SBA within [facility] distances to perform BADLs  Short term goal 4: Pt will perform LB dressing with min A with LHAE  prn to increase independence with self care. Following session, patient left in safe position with all fall risk precautions in place. 98.1

## 2022-12-08 NOTE — TELEPHONE ENCOUNTER
----- Message from Rosi Flores RN sent at 12/1/2022  8:27 AM CST -----  Labs approved  CT scheduled  [place] scheduled.     Please schedule follow up 12/29    ----- Message -----  From: Camilla Aviles PA-C  Sent: 11/30/2022  12:00 AM CST  To: Onc [facility] Msg Pool, #      ----- Message -----  From: Renee Hogan  Sent: 11/29/2022  10:01 AM CST  To: Onc Bur Med [facility] Msg Pool, #    Watch for lab auth and call patient to schedule labs. Patient will be contacted to schedule biopsy and will be calling to make a CT appt. Watch for all to be scheduled and call patient to set up fu once all results are in.          CABG x3 scheduled 07.27.2021 with Madina   Please place PAT orders-  PAT apt 07.23.2021  Thanks

## 2022-12-11 DIAGNOSIS — I25.10 CAD, MULTIPLE VESSEL: ICD-10-CM

## 2022-12-12 RX ORDER — ASPIRIN 81 MG/1
TABLET, COATED ORAL
Qty: 30 TABLET | Refills: 8 | OUTPATIENT
Start: 2022-12-12

## 2022-12-12 NOTE — TELEPHONE ENCOUNTER
Called and spoke with patient- he states that he is getting his medications elsewhere- did not want to give me that information of who he was getting them from- advised to schedule an appointment if he needs refills or needs anything further from us.

## 2023-02-09 ENCOUNTER — OFFICE VISIT (OUTPATIENT)
Dept: FAMILY MEDICINE CLINIC | Age: 53
End: 2023-02-09

## 2023-02-09 VITALS
WEIGHT: 183.6 LBS | BODY MASS INDEX: 26.28 KG/M2 | OXYGEN SATURATION: 96 % | HEART RATE: 92 BPM | RESPIRATION RATE: 16 BRPM | HEIGHT: 70 IN

## 2023-02-09 DIAGNOSIS — Z91.199 NON-COMPLIANCE: Primary | ICD-10-CM

## 2023-02-09 PROCEDURE — 99999 PR OFFICE/OUTPT VISIT,PROCEDURE ONLY: CPT | Performed by: NURSE PRACTITIONER

## 2023-02-09 ASSESSMENT — PATIENT HEALTH QUESTIONNAIRE - PHQ9
SUM OF ALL RESPONSES TO PHQ QUESTIONS 1-9: 0
SUM OF ALL RESPONSES TO PHQ9 QUESTIONS 1 & 2: 0
2. FEELING DOWN, DEPRESSED OR HOPELESS: 0
SUM OF ALL RESPONSES TO PHQ QUESTIONS 1-9: 0
1. LITTLE INTEREST OR PLEASURE IN DOING THINGS: 0

## 2023-02-09 NOTE — PROGRESS NOTES
Patient was here today wanting to be cleared for back surgery on 2/22/23. He has not been seen by myself since 2021, has not taken any of his medications. His blood pressure is 210/110 in the office today . When I voiced to him that I would not clear him for this surgery, until he becomes compliant with his medications and his blood pressure is controlled. Patient has a past medical history of coronary artery  disease and stroke. Patient became very upset and using a very loud voice began yelling at the provider after he was told that he was not going to be cleared for his surgery today. Due to this encounter and now being a conflict of personalities and patient's aggressiveness patient is no longer going to be seen by myself. He was advised to go to the emergency department for his blood pressure control and given information on other providers in the area. This will be forwarded to Office manage to dismiss. Please noel the patient as dismissed.

## 2023-02-10 LAB
AVERAGE GLUCOSE: 150
BUN BLDV-MCNC: 14 MG/DL
CALCIUM SERPL-MCNC: 10.2 MG/DL
CHLORIDE BLD-SCNC: 95 MMOL/L
CO2: 28 MMOL/L
CREAT SERPL-MCNC: 0.9 MG/DL
EGFR: >60
GLUCOSE BLD-MCNC: 187 MG/DL
HBA1C MFR BLD: 7 %
HCT VFR BLD CALC: 44.6 % (ref 41–53)
HEMOGLOBIN: 15.7 G/DL (ref 13.5–17.5)
INR BLD: NORMAL
PLATELET # BLD: 235 K/ΜL
POTASSIUM SERPL-SCNC: 4.9 MMOL/L
PROTIME: 0.95 SECONDS
SODIUM BLD-SCNC: 133 MMOL/L
WBC # BLD: 5.1 10^3/ML

## 2023-02-22 ENCOUNTER — HOSPITAL ENCOUNTER (OUTPATIENT)
Age: 53
Setting detail: SPECIMEN
Discharge: HOME | End: 2023-02-22

## 2023-02-22 ENCOUNTER — OFFICE VISIT (OUTPATIENT)
Dept: FAMILY MEDICINE CLINIC | Age: 53
End: 2023-02-22
Payer: COMMERCIAL

## 2023-02-22 VITALS
WEIGHT: 185.2 LBS | SYSTOLIC BLOOD PRESSURE: 188 MMHG | RESPIRATION RATE: 16 BRPM | OXYGEN SATURATION: 99 % | BODY MASS INDEX: 26.57 KG/M2 | HEART RATE: 71 BPM | TEMPERATURE: 97 F | DIASTOLIC BLOOD PRESSURE: 96 MMHG

## 2023-02-22 DIAGNOSIS — E66.3 OVERWEIGHT (BMI 25.0-29.9): ICD-10-CM

## 2023-02-22 DIAGNOSIS — M47.816 LUMBAR SPONDYLOSIS: ICD-10-CM

## 2023-02-22 DIAGNOSIS — Z12.5 SCREENING PSA (PROSTATE SPECIFIC ANTIGEN): ICD-10-CM

## 2023-02-22 DIAGNOSIS — M54.16 LUMBAR RADICULOPATHY: ICD-10-CM

## 2023-02-22 DIAGNOSIS — I25.10 MULTIPLE VESSEL CORONARY ARTERY DISEASE: ICD-10-CM

## 2023-02-22 DIAGNOSIS — Z86.73 HX OF TIA (TRANSIENT ISCHEMIC ATTACK) AND STROKE: ICD-10-CM

## 2023-02-22 DIAGNOSIS — R01.1 SYSTOLIC MURMUR: ICD-10-CM

## 2023-02-22 DIAGNOSIS — E78.49 OTHER HYPERLIPIDEMIA: ICD-10-CM

## 2023-02-22 DIAGNOSIS — I10 PRIMARY HYPERTENSION: ICD-10-CM

## 2023-02-22 DIAGNOSIS — Z13.31 DEPRESSION SCREEN: ICD-10-CM

## 2023-02-22 DIAGNOSIS — Z95.1 HX OF CABG: ICD-10-CM

## 2023-02-22 DIAGNOSIS — K21.9 GASTROESOPHAGEAL REFLUX DISEASE WITHOUT ESOPHAGITIS: ICD-10-CM

## 2023-02-22 DIAGNOSIS — Z72.0 TOBACCO ABUSE: ICD-10-CM

## 2023-02-22 DIAGNOSIS — I10 PRIMARY HYPERTENSION: Primary | ICD-10-CM

## 2023-02-22 PROBLEM — I63.89 AC CEREBRAL INFARCTION W/ ISCHEMIA (HCC): Status: RESOLVED | Noted: 2021-03-25 | Resolved: 2023-02-22

## 2023-02-22 LAB
ABSOLUTE EOS #: 0.13 K/UL (ref 0–0.44)
ABSOLUTE IMMATURE GRANULOCYTE: <0.03 K/UL (ref 0–0.3)
ABSOLUTE LYMPH #: 1.29 K/UL (ref 1.1–3.7)
ABSOLUTE MONO #: 0.44 K/UL (ref 0.1–1.2)
BASOPHILS # BLD: 1 % (ref 0–2)
BASOPHILS ABSOLUTE: 0.03 K/UL (ref 0–0.2)
EOSINOPHILS RELATIVE PERCENT: 3 % (ref 1–4)
HCT VFR BLD AUTO: 40.6 % (ref 40.7–50.3)
HGB BLD-MCNC: 14.8 G/DL (ref 13–17)
IMMATURE GRANULOCYTES: 0 %
LYMPHOCYTES # BLD: 27 % (ref 24–43)
MCH RBC QN AUTO: 36.2 PG (ref 25.2–33.5)
MCHC RBC AUTO-ENTMCNC: 36.5 G/DL (ref 28.4–34.8)
MCV RBC AUTO: 99.3 FL (ref 82.6–102.9)
MONOCYTES # BLD: 9 % (ref 3–12)
NRBC AUTOMATED: 0 PER 100 WBC
PDW BLD-RTO: 11.9 % (ref 11.8–14.4)
PLATELET # BLD AUTO: 250 K/UL (ref 138–453)
PMV BLD AUTO: 9.8 FL (ref 8.1–13.5)
RBC # BLD: 4.09 M/UL (ref 4.21–5.77)
SEG NEUTROPHILS: 60 % (ref 36–65)
SEGMENTED NEUTROPHILS ABSOLUTE COUNT: 2.85 K/UL (ref 1.5–8.1)
WBC # BLD AUTO: 4.8 K/UL (ref 3.5–11.3)

## 2023-02-22 PROCEDURE — G0444 DEPRESSION SCREEN ANNUAL: HCPCS | Performed by: STUDENT IN AN ORGANIZED HEALTH CARE EDUCATION/TRAINING PROGRAM

## 2023-02-22 PROCEDURE — 99204 OFFICE O/P NEW MOD 45 MIN: CPT | Performed by: STUDENT IN AN ORGANIZED HEALTH CARE EDUCATION/TRAINING PROGRAM

## 2023-02-22 PROCEDURE — 3080F DIAST BP >= 90 MM HG: CPT | Performed by: STUDENT IN AN ORGANIZED HEALTH CARE EDUCATION/TRAINING PROGRAM

## 2023-02-22 PROCEDURE — 3077F SYST BP >= 140 MM HG: CPT | Performed by: STUDENT IN AN ORGANIZED HEALTH CARE EDUCATION/TRAINING PROGRAM

## 2023-02-22 PROCEDURE — 36415 COLL VENOUS BLD VENIPUNCTURE: CPT | Performed by: STUDENT IN AN ORGANIZED HEALTH CARE EDUCATION/TRAINING PROGRAM

## 2023-02-22 RX ORDER — LISINOPRIL 20 MG/1
40 TABLET ORAL DAILY
Qty: 180 TABLET | Refills: 0 | Status: SHIPPED | OUTPATIENT
Start: 2023-02-22 | End: 2023-05-23

## 2023-02-22 RX ORDER — AMLODIPINE BESYLATE 5 MG/1
5 TABLET ORAL DAILY
Qty: 90 TABLET | Refills: 0 | Status: SHIPPED | OUTPATIENT
Start: 2023-02-22 | End: 2023-05-23

## 2023-02-22 SDOH — ECONOMIC STABILITY: FOOD INSECURITY: WITHIN THE PAST 12 MONTHS, YOU WORRIED THAT YOUR FOOD WOULD RUN OUT BEFORE YOU GOT MONEY TO BUY MORE.: NEVER TRUE

## 2023-02-22 SDOH — ECONOMIC STABILITY: HOUSING INSECURITY
IN THE LAST 12 MONTHS, WAS THERE A TIME WHEN YOU DID NOT HAVE A STEADY PLACE TO SLEEP OR SLEPT IN A SHELTER (INCLUDING NOW)?: YES

## 2023-02-22 SDOH — ECONOMIC STABILITY: INCOME INSECURITY: HOW HARD IS IT FOR YOU TO PAY FOR THE VERY BASICS LIKE FOOD, HOUSING, MEDICAL CARE, AND HEATING?: NOT HARD AT ALL

## 2023-02-22 SDOH — ECONOMIC STABILITY: FOOD INSECURITY: WITHIN THE PAST 12 MONTHS, THE FOOD YOU BOUGHT JUST DIDN'T LAST AND YOU DIDN'T HAVE MONEY TO GET MORE.: NEVER TRUE

## 2023-02-22 ASSESSMENT — ENCOUNTER SYMPTOMS
COUGH: 0
VOMITING: 0
EYE PAIN: 0
CONSTIPATION: 0
SHORTNESS OF BREATH: 0
BACK PAIN: 1
ABDOMINAL PAIN: 0
NAUSEA: 0
DIARRHEA: 0
RHINORRHEA: 0
EYE REDNESS: 0

## 2023-02-22 NOTE — PROGRESS NOTES
Yassine Caceres (:  1970) is a 45 y.o. male,New patient, here for evaluation of the following chief complaint(s):  New Patient (Est care ), Health Maintenance (Vaccines /A1C/Cologuard ), and Pre-op Exam (Going to schedule within a month /Surgery [facility] )         ASSESSMENT/PLAN:  1. Primary hypertension  -     CBC with Auto Differential; Future  -     Comprehensive Metabolic Panel; Future  -     TSH with Reflex; Future  -     Microalbumin / Creatinine Urine Ratio; Future  -     lisinopril (PRINIVIL;ZESTRIL) 20 MG tablet; Take 2 tablets by mouth daily, Disp-180 tablet, R-0Patient using a Good Rx couponNormal  -     amLODIPine (NORVASC) 5 MG tablet; Take 1 tablet by mouth daily, Disp-90 tablet, R-0Normal  2. Systolic murmur  3. Other hyperlipidemia  -     Lipid Panel; Future  4. Lumbar spondylosis  5. Overweight (BMI 25.0-29.9)  -     Hemoglobin A1C; Future  6. Lumbar radiculopathy  7. Multiple vessel coronary artery disease  8. Hx of CABG  9. Gastroesophageal reflux disease without esophagitis  10. Hx of TIA (transient ischemic attack) and stroke  11. Tobacco abuse  12. Screening PSA (prostate specific antigen)  -     PSA Screening; Future  13. Depression screen  61-year-old male presents the office to establish as a new patient. Diagnoses and orders as above. Plan to follow-up in 1 month for blood pressure follow-up. Hypertension: Chronic, uncontrolled. Blood pressure today 188/96. Patient seems to have a long history of uncontrolled blood pressure per chart review. Patient also has a pertinent finding of systolic murmur on exam that may be a flow murmur secondary to elevated blood pressure versus valvular issue secondary to history of CABG. We will plan to obtain fasting blood work, increase lisinopril from 20 mg daily to 40 mg daily, and start amlodipine 5 mg daily. Continue metoprolol 25 mg twice daily. Patient is currently asymptomatic with systolic murmur.   Will most likely need cardiac clearance/stress testing/echocardiogram prior to procedure. Hyperlipidemia: Chronic, history of. Continue atorvastatin 40 mg nightly. We will check blood work and follow-up. Lumbar spondylosis with lumbar radiculopathy: Chronic, uncontrolled. Patient has a long history of lumbar spondylosis and radiculopathy with previous plans of having L2-S1 decompression and posterior fusion with orthopedic [facility]. Patient will need to reschedule procedure that was previously scheduled for today 2/22/2023. We will need to get medically stabilized prior to medical clearance. We will plan to obtain blood work and follow-up in 1 month for review. Overweight: Chronic, uncontrolled. BMI today 26.57. We will plan to check fasting blood work and diabetic screen and follow-up. Multiple vessel coronary artery disease with history of triple bypass/CABG: Chronic, history of. Currently asymptomatic without any concerns for angina. Continue aspirin, Plavix. Continue with treatment as above. GERD: Chronic, controlled on omeprazole 40 mg daily. Continue current medication regimen. Will most likely need EGD colonoscopy in the near future. History of TIA: Chronic, history of. No residual deficits at this time. Tobacco use: Chronic, history of. Patient is uninterested in cessation at this time. Due for screening PSA will obtain on upcoming blood work. Depression screen: Negative depression screen. No concerns at this time. PHQ-9 Total Score: 0 (2/22/2023 10:39 AM)    Patient is due for multiple preventative measures including tetanus vaccine, colon cancer screening, low-dose lung cancer screening. Patient declines at this time we will plan to discuss on follow-up/yearly wellness exam.    Return in about 1 month (around 3/22/2023) for blood pressure follow up.        Subjective   SUBJECTIVE/OBJECTIVE:  67-year-old male presents office to establish as a new patient and discuss concerns of preoperative testing. Patient has a pertinent past medical history of hypertension, hyperlipidemia, lumbar spondylosis with lumbar radiculopathy, overweight, multiple vessel coronary artery disease status post CABG, GERD, history of TIA, tobacco abuse. Multiple chronic medical conditions reviewed today: As stated above    Patient states the reason he is here today is that he was discharged from previous provider offices secondary to \"noncompliance\". Patient was supposed to have lumbar decompression surgery with posterior fusion of L2 through S1 by [facility] Dr. Camelia Dumont today on 2/22/2023. Patient said his surgery had to be canceled secondary to not getting clearance through his previous doctor. He is here today to discuss preoperative clearance and to get established. Patient has been following up with orthopedics and pain management status post epidural injections that lasted approximately 6 months for pain. But is now interested in pursuing surgical options. Patient states all of his other chronic medical conditions are stable. He is currently on lisinopril 20 mg daily, metoprolol for his blood pressure. He has a history of triple bypass/CABG 1 to 2 years ago currently on aspirin and Plavix and doing well. Patient states he has not followed up with cardiology since being released post CABG. Patient also has a history of hyperlipidemia currently on atorvastatin 40 mg daily. And has a history of acid reflux on omeprazole 40 mg daily. Patient states his blood pressure is always elevated and was told this by previous providers. He has no acute concerns today just interesting in establish. Patient is a pertinent history of 1 pack/day smoker for the past 30+ years. Is not interested in cessation at this time. Medications were reviewed in detail.  Concerns about medications today: No concerns on medications    Preventative care discussed: PSA screening    Specialists: Orthopedics    Past Medical History:   Diagnosis Date    Acid reflux     Arthritis     back    CAD (coronary artery disease)     CAD, multiple vessel 8/3/2021    Chronic back pain     Essential hypertension     Stroke ([facility] [address] 2021    left side       Past Surgical History:   Procedure Laterality Date    BACK INJECTION  2021    CARDIAC CATHETERIZATION  2021    CATARACT REMOVAL      CORONARY ARTERY BYPASS GRAFT N/A 8/3/2021    CABG X 3, MARY performed by Sofie Jones MD at [place] [id number] History   Problem Relation Age of Onset    Breast Cancer Mother     Other Father         renal failure       Social History     Tobacco Use    Smoking status: Every Day     Packs/day: 1.00     Years: 23.00     Pack years: 23.00     Types: Cigarettes     Start date: 1990     Last attempt to quit: 2021     Years since quittin.5    Smokeless tobacco: Never   Vaping Use    Vaping Use: Never used   Substance Use Topics    Alcohol use: Yes     Comment: 8 beers a day     Drug use: Never         Current Outpatient Medications:     lisinopril (PRINIVIL;ZESTRIL) 20 MG tablet, Take 2 tablets by mouth daily, Disp: 180 tablet, Rfl: 0    amLODIPine (NORVASC) 5 MG tablet, Take 1 tablet by mouth daily, Disp: 90 tablet, Rfl: 0    clopidogrel (PLAVIX) 75 MG tablet, Take 1 tablet by mouth daily, Disp: 90 tablet, Rfl: 3    metoprolol tartrate (LOPRESSOR) 25 MG tablet, Take 1 tablet by mouth 2 times daily, Disp: 180 tablet, Rfl: 3    omeprazole (PRILOSEC) 20 MG delayed release capsule, Take 2 capsules by mouth Daily, Disp: 180 capsule, Rfl: 3    aspirin 81 MG EC tablet, Take 1 tablet by mouth daily, Disp: 90 tablet, Rfl: 3    atorvastatin (LIPITOR) 40 MG tablet, Take 1 tablet by mouth nightly, Disp: 90 tablet, Rfl: 3    No Known Allergies    Review of Systems   Constitutional:  Negative for fatigue and fever. HENT:  Negative for congestion, postnasal drip and rhinorrhea.     Eyes:  Negative for pain and redness. Respiratory:  Negative for cough and shortness of breath. Cardiovascular:  Negative for chest pain and palpitations. Hypertension  History of CABG/coronary artery disease   Gastrointestinal:  Negative for abdominal pain, constipation, diarrhea, nausea and vomiting. GERD   Genitourinary:  Negative for difficulty urinating and dysuria. Musculoskeletal:  Positive for back pain. Low back pain   Skin:  Negative for pallor and rash. Neurological:  Negative for dizziness and headaches. Psychiatric/Behavioral:  Negative for dysphoric mood. The patient is not nervous/anxious. Objective   Vitals:    02/22/23 1039 02/22/23 1042   BP: (!) 190/102 (!) 188/96   Pulse: 71    Resp: 16    Temp: 97 °F (36.1 °C)    TempSrc: Temporal    SpO2: 99%    Weight: 185 lb 3.2 oz (84 kg)        Physical Exam  Vitals and nursing note reviewed. Constitutional:       General: He is not in acute distress. Appearance: Normal appearance. Eyes:      General:         Right eye: No discharge. Left eye: No discharge. Conjunctiva/sclera: Conjunctivae normal.   Cardiovascular:      Rate and Rhythm: Normal rate and regular rhythm. Pulses: Normal pulses. Heart sounds: Murmur (Systolic murmur appreciated. 3-4/6) heard. Pulmonary:      Effort: Pulmonary effort is normal. No respiratory distress. Breath sounds: Normal breath sounds. No wheezing or rhonchi. Musculoskeletal:      Right lower leg: No edema. Left lower leg: No edema. Skin:     General: Skin is warm and dry. Neurological:      General: No focal deficit present. Mental Status: He is alert. Mental status is at baseline. Psychiatric:         Mood and Affect: Mood normal.         Behavior: Behavior normal.         Thought Content: Thought content normal.         Judgment: Judgment normal.                An electronic signature was used to authenticate this note.     --Dylan Price DO **This report has been created using voice recognition software. It may contain minor errors which are inherent in voice recognition technology. **

## 2023-02-22 NOTE — PROGRESS NOTES
Venipuncture obtained from  right arm. Patient tolerated the procedure without complications or complaints.   1 pst 1 sst 1 lvv

## 2023-02-23 LAB
ALBUMIN SERPL-MCNC: 4.4 G/DL (ref 3.5–5.2)
ALBUMIN/GLOBULIN RATIO: 1.2 (ref 1–2.5)
ALP SERPL-CCNC: 84 U/L (ref 40–129)
ALT SERPL-CCNC: 22 U/L (ref 5–41)
ANION GAP SERPL CALCULATED.3IONS-SCNC: 15 MMOL/L (ref 9–17)
AST SERPL-CCNC: 38 U/L
BILIRUB SERPL-MCNC: 1.1 MG/DL (ref 0.3–1.2)
BUN SERPL-MCNC: 9 MG/DL (ref 6–20)
CALCIUM SERPL-MCNC: 10 MG/DL (ref 8.6–10.4)
CHLORIDE SERPL-SCNC: 83 MMOL/L (ref 98–107)
CHOLEST SERPL-MCNC: 139 MG/DL
CHOLESTEROL/HDL RATIO: 2.9
CO2 SERPL-SCNC: 22 MMOL/L (ref 20–31)
CREAT SERPL-MCNC: 0.79 MG/DL (ref 0.7–1.2)
EST. AVERAGE GLUCOSE BLD GHB EST-MCNC: 154 MG/DL
GFR SERPL CREATININE-BSD FRML MDRD: >60 ML/MIN/1.73M2
GLUCOSE SERPL-MCNC: 227 MG/DL (ref 70–99)
HBA1C MFR BLD: 7 % (ref 4–6)
HDLC SERPL-MCNC: 48 MG/DL
LDLC SERPL CALC-MCNC: 78 MG/DL (ref 0–130)
POTASSIUM SERPL-SCNC: 5.2 MMOL/L (ref 3.7–5.3)
PROSTATE SPECIFIC ANTIGEN: 0.6 NG/ML
PROT SERPL-MCNC: 8 G/DL (ref 6.4–8.3)
SODIUM SERPL-SCNC: 120 MMOL/L (ref 135–144)
TRIGL SERPL-MCNC: 65 MG/DL
TSH SERPL-ACNC: 4.3 UIU/ML (ref 0.3–5)

## 2023-03-15 ENCOUNTER — OFFICE VISIT (OUTPATIENT)
Dept: FAMILY MEDICINE CLINIC | Age: 53
End: 2023-03-15
Payer: COMMERCIAL

## 2023-03-15 VITALS
TEMPERATURE: 97.1 F | SYSTOLIC BLOOD PRESSURE: 187 MMHG | BODY MASS INDEX: 26.54 KG/M2 | DIASTOLIC BLOOD PRESSURE: 76 MMHG | HEART RATE: 70 BPM | WEIGHT: 185 LBS | OXYGEN SATURATION: 100 % | RESPIRATION RATE: 16 BRPM

## 2023-03-15 DIAGNOSIS — E11.9 TYPE 2 DIABETES MELLITUS WITHOUT COMPLICATION, WITHOUT LONG-TERM CURRENT USE OF INSULIN (HCC): ICD-10-CM

## 2023-03-15 DIAGNOSIS — I10 PRIMARY HYPERTENSION: Primary | ICD-10-CM

## 2023-03-15 DIAGNOSIS — E87.1 HYPONATREMIA: ICD-10-CM

## 2023-03-15 DIAGNOSIS — E78.49 OTHER HYPERLIPIDEMIA: ICD-10-CM

## 2023-03-15 PROBLEM — R29.898 WEAKNESS OF LEFT LOWER EXTREMITY: Status: RESOLVED | Noted: 2021-03-25 | Resolved: 2023-03-15

## 2023-03-15 PROBLEM — R01.1 SYSTOLIC MURMUR: Status: RESOLVED | Noted: 2023-02-22 | Resolved: 2023-03-15

## 2023-03-15 PROCEDURE — 99214 OFFICE O/P EST MOD 30 MIN: CPT | Performed by: STUDENT IN AN ORGANIZED HEALTH CARE EDUCATION/TRAINING PROGRAM

## 2023-03-15 PROCEDURE — 3078F DIAST BP <80 MM HG: CPT | Performed by: STUDENT IN AN ORGANIZED HEALTH CARE EDUCATION/TRAINING PROGRAM

## 2023-03-15 PROCEDURE — 3077F SYST BP >= 140 MM HG: CPT | Performed by: STUDENT IN AN ORGANIZED HEALTH CARE EDUCATION/TRAINING PROGRAM

## 2023-03-15 PROCEDURE — 3051F HG A1C>EQUAL 7.0%<8.0%: CPT | Performed by: STUDENT IN AN ORGANIZED HEALTH CARE EDUCATION/TRAINING PROGRAM

## 2023-03-15 RX ORDER — AMLODIPINE BESYLATE 10 MG/1
10 TABLET ORAL DAILY
Qty: 90 TABLET | Refills: 1 | Status: SHIPPED | OUTPATIENT
Start: 2023-03-15

## 2023-03-15 RX ORDER — HYDROCHLOROTHIAZIDE 25 MG/1
25 TABLET ORAL EVERY MORNING
Qty: 90 TABLET | Refills: 0 | Status: SHIPPED | OUTPATIENT
Start: 2023-03-15 | End: 2023-06-13

## 2023-03-15 ASSESSMENT — ENCOUNTER SYMPTOMS
CONSTIPATION: 0
DIARRHEA: 0
SHORTNESS OF BREATH: 0
EYE PAIN: 0
EYE REDNESS: 0
NAUSEA: 0
VOMITING: 0
RHINORRHEA: 0
ABDOMINAL PAIN: 0
COUGH: 0
BACK PAIN: 1

## 2023-03-15 NOTE — PROGRESS NOTES
Risa Jean (:  1970) is a 45 y.o. male,Established patient, here for evaluation of the following chief complaint(s):  Hypertension (1 mo fu ) and Health Maintenance (Vaccines /Cologuard /CT lung screening )       ASSESSMENT/PLAN:  1. Primary hypertension  -     CBC with Auto Differential; Future  -     Comprehensive Metabolic Panel; Future  -     amLODIPine (NORVASC) 10 MG tablet; Take 1 tablet by mouth daily, Disp-90 tablet, R-1Normal  -     hydroCHLOROthiazide (HYDRODIURIL) 25 MG tablet; Take 1 tablet by mouth every morning, Disp-90 tablet, R-0Normal  2. Type 2 diabetes mellitus without complication, without long-term current use of insulin (HCC)  -     Comprehensive Metabolic Panel; Future  -     Hemoglobin A1C; Future  -     Microalbumin / Creatinine Urine Ratio; Future  3. Other hyperlipidemia  -     Lipid Panel; Future  4. Hyponatremia  39-year-old male presents the office to follow-up on blood pressure and labs. Hypertension: Chronic, uncontrolled. Blood pressure today 187/76. We will plan to recheck blood work in 3 months. Increase amlodipine from 5 mg to 10 mg. Start hydrochlorothiazide 25 mg. Continue lisinopril 40 mg. Continue metoprolol 25 mg twice daily. If patient continues to not be controlled on above regimen on follow-up he will have treatment resistant hypertension and could consider seeing cardiology. We will plan to follow-up in 2 weeks for nurse visit in 1 month for provider visit. Type 2 diabetes: New diagnosis. Patient's most recent hemoglobin A1c on blood work screen 7.0%. Long discussion was had about treatment. Patient declines medication assisted therapy at this time. We will plan to follow-up closely with repeat blood work in 3 months. Hyperlipidemia: Chronic, history of. We will plan to check blood work and follow-up in 3 months. Continue atorvastatin 40 mg daily.     Hyponatremia: Patient's most recent sodium level 120 Per chart review does have a history of hyponatremia. Low sodium may be due to poor salt in diet but is more likely related to increased alcohol intake in the evening times. Patient states he drinks approximately 4-5 beers every night. Educated importance of cutting back we will monitor sodium. Return in about 1 month (around 4/15/2023) for blood pressure. Subjective   SUBJECTIVE/OBJECTIVE:  57-year-old male presents the office to follow-up on blood pressure and recent blood work. Patient has a pertinent past medical history of hypertension, diabetes, hyperlipidemia, hyponatremia that was reviewed today. Multiple chronic medical conditions reviewed today: As stated above    Patient states overall he is doing fine. Is been taking his amlodipine 5 mg daily, lisinopril 40 mg daily, Toprol 25 mg twice daily. States his blood pressure is still high when he arrived in the office today. Patient is pleased that is somewhat lower but is still high. Patient is interesting his blood pressure under better control so that he is able to have surgery in the near future. Patient denies any chest pain, palpitations, shortness of breath, lower extremity swelling. Denies any difficulty with everyday activities. Medications were reviewed in detail. Concerns about medications today: No concerns about medications    Labs were reviewed in detail with patient and all questions answered.     Preventative care discussed: None    Specialists: Orthopedics    Past Medical History:   Diagnosis Date    Acid reflux     Arthritis     back    CAD (coronary artery disease)     CAD, multiple vessel 8/3/2021    Chronic back pain     Essential hypertension     Stroke ([facility] [address] 03/2021    left side       Past Surgical History:   Procedure Laterality Date    BACK INJECTION  06/26/2021    CARDIAC CATHETERIZATION  07/07/2021    CATARACT REMOVAL      CORONARY ARTERY BYPASS GRAFT N/A 8/3/2021    CABG X 3, MARY performed by Diogo Asher MD at [place] [id number] History   Problem Relation Age of Onset    Breast Cancer Mother     Other Father         renal failure       Social History     Tobacco Use    Smoking status: Every Day     Packs/day: 1.00     Years: 23.00     Pack years: 23.00     Types: Cigarettes     Start date: 1990     Last attempt to quit: 2021     Years since quittin.6    Smokeless tobacco: Never   Vaping Use    Vaping Use: Never used   Substance Use Topics    Alcohol use: Yes     Comment: 8 beers a day     Drug use: Never         Current Outpatient Medications:     amLODIPine (NORVASC) 10 MG tablet, Take 1 tablet by mouth daily, Disp: 90 tablet, Rfl: 1    hydroCHLOROthiazide (HYDRODIURIL) 25 MG tablet, Take 1 tablet by mouth every morning, Disp: 90 tablet, Rfl: 0    lisinopril (PRINIVIL;ZESTRIL) 20 MG tablet, Take 2 tablets by mouth daily, Disp: 180 tablet, Rfl: 0    clopidogrel (PLAVIX) 75 MG tablet, Take 1 tablet by mouth daily, Disp: 90 tablet, Rfl: 3    metoprolol tartrate (LOPRESSOR) 25 MG tablet, Take 1 tablet by mouth 2 times daily, Disp: 180 tablet, Rfl: 3    omeprazole (PRILOSEC) 20 MG delayed release capsule, Take 2 capsules by mouth Daily, Disp: 180 capsule, Rfl: 3    aspirin 81 MG EC tablet, Take 1 tablet by mouth daily, Disp: 90 tablet, Rfl: 3    atorvastatin (LIPITOR) 40 MG tablet, Take 1 tablet by mouth nightly, Disp: 90 tablet, Rfl: 3    No Known Allergies    Review of Systems   Constitutional:  Negative for fatigue and fever. HENT:  Negative for congestion, postnasal drip and rhinorrhea. Eyes:  Negative for pain and redness. Respiratory:  Negative for cough and shortness of breath. Cardiovascular:  Negative for chest pain and palpitations. Hypertension  History of CABG/coronary artery disease   Gastrointestinal:  Negative for abdominal pain, constipation, diarrhea, nausea and vomiting. GERD   Genitourinary:  Negative for difficulty urinating and dysuria. Musculoskeletal:  Positive for back pain. Low back pain   Skin:  Negative for pallor and rash. Neurological:  Negative for dizziness and headaches. Psychiatric/Behavioral:  Negative for dysphoric mood. The patient is not nervous/anxious. Objective   Vitals:    03/15/23 1146   BP: (!) 187/76   Pulse: 70   Resp: 16   Temp: 97.1 °F (36.2 °C)   SpO2: 100%     Physical Exam  Vitals and nursing note reviewed. Constitutional:       General: He is not in acute distress. Appearance: Normal appearance. Cardiovascular:      Rate and Rhythm: Normal rate and regular rhythm. Pulses: Normal pulses. Heart sounds: Normal heart sounds. No murmur heard. Pulmonary:      Effort: Pulmonary effort is normal. No respiratory distress. Breath sounds: Normal breath sounds. No wheezing or rhonchi. Abdominal:      General: Bowel sounds are normal. There is no distension. Palpations: Abdomen is soft. Tenderness: There is no abdominal tenderness. There is no guarding. Musculoskeletal:      Cervical back: Neck supple. Lymphadenopathy:      Cervical: No cervical adenopathy. Neurological:      Mental Status: He is alert. An electronic signature was used to authenticate this note. --Fernando De La Torre DO          **This report has been created using voice recognition software. It may contain minor errors which are inherent in voice recognition technology. **

## 2023-03-15 NOTE — PATIENT INSTRUCTIONS
-amlodipine you take 5mg now. Increase to 10mg.   -start new medication hydrochlorothiazide 25mg daily.

## 2023-03-29 ENCOUNTER — NURSE ONLY (OUTPATIENT)
Dept: FAMILY MEDICINE CLINIC | Age: 53
End: 2023-03-29

## 2023-03-29 VITALS — SYSTOLIC BLOOD PRESSURE: 140 MMHG | DIASTOLIC BLOOD PRESSURE: 76 MMHG

## 2023-03-29 DIAGNOSIS — I25.10 CAD, MULTIPLE VESSEL: ICD-10-CM

## 2023-03-29 DIAGNOSIS — R03.0 ELEVATED BLOOD PRESSURE READING: Primary | ICD-10-CM

## 2023-03-29 RX ORDER — CLOPIDOGREL BISULFATE 75 MG/1
75 TABLET ORAL DAILY
Qty: 90 TABLET | Refills: 3 | Status: SHIPPED | OUTPATIENT
Start: 2023-03-29 | End: 2023-06-27

## 2023-03-29 NOTE — PROGRESS NOTES
Pt came into clinic for blood pressure check.      BP Readings from Last 3 Encounters:   03/29/23 (!) 140/76   03/15/23 (!) 187/76   02/22/23 (!) 188/96     First reading - 142/80  Second reading - 140/76

## 2023-04-20 DIAGNOSIS — I10 PRIMARY HYPERTENSION: ICD-10-CM

## 2023-04-20 RX ORDER — AMLODIPINE BESYLATE 10 MG/1
10 TABLET ORAL DAILY
Qty: 90 TABLET | Refills: 1 | Status: SHIPPED | OUTPATIENT
Start: 2023-04-20

## 2023-05-02 ENCOUNTER — HOSPITAL ENCOUNTER (OUTPATIENT)
Age: 53
Discharge: HOME | End: 2023-05-02
Payer: COMMERCIAL

## 2023-05-02 ENCOUNTER — HOSPITAL ENCOUNTER (OUTPATIENT)
Dept: PREADMISSION TESTING | Age: 53
Discharge: HOME | End: 2023-05-06
Payer: COMMERCIAL

## 2023-05-02 VITALS
RESPIRATION RATE: 16 BRPM | HEIGHT: 70 IN | WEIGHT: 180.56 LBS | DIASTOLIC BLOOD PRESSURE: 60 MMHG | HEART RATE: 61 BPM | TEMPERATURE: 98.4 F | BODY MASS INDEX: 25.85 KG/M2 | SYSTOLIC BLOOD PRESSURE: 147 MMHG | OXYGEN SATURATION: 100 %

## 2023-05-02 DIAGNOSIS — I10 PRIMARY HYPERTENSION: ICD-10-CM

## 2023-05-02 DIAGNOSIS — E11.9 TYPE 2 DIABETES MELLITUS WITHOUT COMPLICATION, WITHOUT LONG-TERM CURRENT USE OF INSULIN (HCC): ICD-10-CM

## 2023-05-02 LAB
ALBUMIN SERPL BCG-MCNC: 4.8 G/DL (ref 3.5–5.1)
ALP SERPL-CCNC: 88 U/L (ref 38–126)
ALT SERPL W/O P-5'-P-CCNC: 14 U/L (ref 11–66)
ANION GAP SERPL CALC-SCNC: 16 MEQ/L (ref 8–16)
APTT PPP: 29.8 SECONDS (ref 22–38)
AST SERPL-CCNC: 17 U/L (ref 5–40)
BASOPHILS ABSOLUTE: 0 THOU/MM3 (ref 0–0.1)
BASOPHILS NFR BLD AUTO: 0.6 %
BILIRUB SERPL-MCNC: 0.9 MG/DL (ref 0.3–1.2)
BUN SERPL-MCNC: 20 MG/DL (ref 7–22)
CALCIUM SERPL-MCNC: 10.3 MG/DL (ref 8.5–10.5)
CHLORIDE SERPL-SCNC: 90 MEQ/L (ref 98–111)
CO2 SERPL-SCNC: 22 MEQ/L (ref 23–33)
CREAT SERPL-MCNC: 0.9 MG/DL (ref 0.4–1.2)
DEPRECATED MEAN GLUCOSE BLD GHB EST-ACNC: 156 MG/DL (ref 70–126)
DEPRECATED RDW RBC AUTO: 45.3 FL (ref 35–45)
EOSINOPHIL NFR BLD AUTO: 3.4 %
EOSINOPHILS ABSOLUTE: 0.2 THOU/MM3 (ref 0–0.4)
ERYTHROCYTE [DISTWIDTH] IN BLOOD BY AUTOMATED COUNT: 12.5 % (ref 11.5–14.5)
GFR SERPL CREATININE-BSD FRML MDRD: > 60 ML/MIN/1.73M2
GLUCOSE SERPL-MCNC: 143 MG/DL (ref 70–108)
HBA1C MFR BLD HPLC: 7.2 % (ref 4.4–6.4)
HCT VFR BLD AUTO: 33.9 % (ref 42–52)
HGB BLD-MCNC: 12 GM/DL (ref 14–18)
IMM GRANULOCYTES # BLD AUTO: 0.02 THOU/MM3 (ref 0–0.07)
IMM GRANULOCYTES NFR BLD AUTO: 0.4 %
INR PPP: 1.02 (ref 0.85–1.13)
LYMPHOCYTES ABSOLUTE: 0.9 THOU/MM3 (ref 1–4.8)
LYMPHOCYTES NFR BLD AUTO: 18.8 %
MCH RBC QN AUTO: 35.3 PG (ref 26–33)
MCHC RBC AUTO-ENTMCNC: 35.4 GM/DL (ref 32.2–35.5)
MCV RBC AUTO: 99.7 FL (ref 80–94)
MONOCYTES ABSOLUTE: 0.4 THOU/MM3 (ref 0.4–1.3)
MONOCYTES NFR BLD AUTO: 9.3 %
MRSA DNA SPEC QL NAA+PROBE: NEGATIVE
NEUTROPHILS NFR BLD AUTO: 67.5 %
NRBC BLD AUTO-RTO: 0 /100 WBC
PLATELET # BLD AUTO: 228 THOU/MM3 (ref 130–400)
PMV BLD AUTO: 8.8 FL (ref 9.4–12.4)
POTASSIUM SERPL-SCNC: 4.8 MEQ/L (ref 3.5–5.2)
PROT SERPL-MCNC: 8.1 G/DL (ref 6.1–8)
RBC # BLD AUTO: 3.4 MILL/MM3 (ref 4.7–6.1)
S AUREUS DNA SPEC QL NAA+PROBE: NEGATIVE
SEGMENTED NEUTROPHILS ABSOLUTE COUNT: 3.1 THOU/MM3 (ref 1.8–7.7)
SODIUM SERPL-SCNC: 128 MEQ/L (ref 135–145)
WBC # BLD AUTO: 4.6 THOU/MM3 (ref 4.8–10.8)

## 2023-05-02 PROCEDURE — 87640 STAPH A DNA AMP PROBE: CPT

## 2023-05-02 PROCEDURE — 87641 MR-STAPH DNA AMP PROBE: CPT

## 2023-05-02 PROCEDURE — 36415 COLL VENOUS BLD VENIPUNCTURE: CPT

## 2023-05-02 PROCEDURE — 85730 THROMBOPLASTIN TIME PARTIAL: CPT

## 2023-05-02 PROCEDURE — 85025 COMPLETE CBC W/AUTO DIFF WBC: CPT

## 2023-05-02 PROCEDURE — 80053 COMPREHEN METABOLIC PANEL: CPT

## 2023-05-02 PROCEDURE — 85610 PROTHROMBIN TIME: CPT

## 2023-05-02 PROCEDURE — 83036 HEMOGLOBIN GLYCOSYLATED A1C: CPT

## 2023-05-02 ASSESSMENT — PAIN DESCRIPTION - LOCATION: LOCATION: BACK

## 2023-05-02 ASSESSMENT — PAIN DESCRIPTION - ONSET: ONSET: PROGRESSIVE

## 2023-05-02 ASSESSMENT — PAIN DESCRIPTION - ORIENTATION: ORIENTATION: LOWER

## 2023-05-02 ASSESSMENT — PAIN DESCRIPTION - PAIN TYPE: TYPE: CHRONIC PAIN

## 2023-05-02 ASSESSMENT — PAIN DESCRIPTION - FREQUENCY: FREQUENCY: INTERMITTENT

## 2023-05-02 ASSESSMENT — PAIN - FUNCTIONAL ASSESSMENT: PAIN_FUNCTIONAL_ASSESSMENT: PREVENTS OR INTERFERES WITH MANY ACTIVE NOT PASSIVE ACTIVITIES

## 2023-05-02 ASSESSMENT — PAIN SCALES - GENERAL: PAINLEVEL_OUTOF10: 8

## 2023-05-02 NOTE — PROGRESS NOTES
Preliminary Discharge Planning Questionnaire  Date of Surgery 5/19/23   Surgeon Bear Little      Having the proper help and care after surgery is very important to your recovery. Who will be able to help you at home when you are discharged from the hospital?    sons   Name(s) Dieudonne Gross and [address]  **Patient lives alone and sons will not be available to stay with him after to surgery**    How many steps to enter your home? 4    Bathroom on first floor? Yes    Bedroom on the first floor? Yes    Do you have an elevated toilet seat to use at home? No    Do you have a walker to use at home? Total Joints - with wheels N/A   Spine - with wheels  Yes     Have you been doing home exercises? *You will go home with some outpatient physical therapy, where do you prefer to go? From [facility], would prefer therapy in [place], [place]    *May need ECF placement after surgery - no family available to help him post-op*    *If needed, what home health agency would you like to use?   Prefer out of [facility]    *Cardiac Rehab plans NA

## 2023-05-02 NOTE — PROGRESS NOTES
Start Clean Shower Instructions          __5/17/23____ (date)   FIRST SHOWER: Two days before surgery: Take a shower and wash your entire body, including your hair and scalp in the following manner:  Wash your hair using normal shampoo. Make sure you rinse the shampoo from your hair and body. Wash your face with your regular soap or cleanser. Use one of the sponges in the [facility] kit and apply 1/3 of the Chlorhexidine soap to the sponge, wash from your neck down. This is very important. Do not use the soap on your face or hair and avoid private areas. Rinse your body thoroughly. This is very important. Using a fresh, clean towel, dry your body. Dress in freshly washed clothes. Do not use lotions, powders, or creams after this shower. __5/18/23______ (date)   SECOND SHOWER: The day before surgery: Take a shower and wash your entire body, including your hair and scalp in the following manner:  Wash your hair using normal shampoo. Make sure you rinse the shampoo from your hair and body. Wash your face with your regular soap or cleanser. Use one of the sponges in the [facility] kit and apply 1/3 of the Chlorhexidine soap to the sponge, wash from your neck down. This is very important. Do not use the soap on your face or hair and avoid private areas. Rinse your body thoroughly. This is very important. Using a fresh, clean towel, dry your body. Dress in freshly washed clothes. Fresh clean sheets and pillow cases should be used after this shower. Do not use lotions, powders, or creams after this shower. __5/19/23______ (date)   THE FINAL SHOWER: The morning of surgery: Take a shower and wash your entire body, including your hair and scalp in the following manner:  Wash your hair using normal shampoo. Make sure you rinse the shampoo from your hair and body.  Wash your face with your

## 2023-05-02 NOTE — PROGRESS NOTES
PAIN:  Silas Meyers describes his pain as 8/10 on pain scale. \"Kills to walk\". Sharp, stabbing, shooting pain to low back that radiates down legs bilat, worse on left side. Numbness and burning sensation down lower extremities. Pain scale and pain management discussed with patient and understanding verbalized.

## 2023-05-02 NOTE — PROGRESS NOTES
SURGERY PREPARATION CHECKLIST     NAME: _______Florentino Camper___________________     DATE OF SURGERY: _______5/19/23______________    Enter Dates, Check (?) circles to indicate task is completed. Date MUPIROCIN NASAL OINTMENT BODY CLEANSING     DAY 1 ______5/14/23___________   Morning    Evening          Day 2 ______5/15/23___________   Morning     Evening        Day 3 ______5/16/23____________   Morning  Evening        Day 4 _____5/17/23____________   Morning    Evening        Day 5 _____5/18/23____________   Morning  Evening        Day 6 _____5/19/23_____________  (Day of Surgery)               PLEASE COMPLETE and BRING THIS CHECKLIST WITH YOU TO [address] to give to your nurse on the day of surgery. You will be notified if you need to use Mupirocin Nasal Ointment.

## 2023-05-02 NOTE — PROGRESS NOTES
NPO after midnight except you may have sips of water with medications instructed to take am of surgery  Bring insurance info and 's license  Wear comfortable clean clothing  Do not bring jewelry or contact lenses  Shower as instructed prior to surgery  Bring medications in original bottles  Bring CPAP machine if you have one  Follow all instructions given by your physician   needed at discharge  Routine preop instructions given for showering with no lotions,creams or powders. Do not shave the surgical area! If needed, your nurse will use clippers to remove any excess hair at the surgical site when you come in for surgery. Do not use a razor on the site of your surgery for at least 2 days prior to surgery because shaving can leave tiny nicks in the skin which may allow germs to enter and cause infection. Information regarding hand hygiene, MRSA, general anesthesia, fall precautions, coughing and deep breathing, infection prevention and signs & symptoms of infection and blood transfusions reviewed and handouts given to patient. Questions answered. Call [facility] [phone] for any questions  Report to [facility] on 2nd floor- written directions given  If you would become ill prior to surgery, please call the surgeon right away  Masks not required at this time, still recommended and we do have them at front information desk if you would like to have one. We like to keep visitors in surgical waiting area to 2 people if possible.

## 2023-05-03 ENCOUNTER — HOSPITAL ENCOUNTER (OUTPATIENT)
Age: 53
Setting detail: SPECIMEN
Discharge: HOME | End: 2023-05-03

## 2023-05-03 ENCOUNTER — OFFICE VISIT (OUTPATIENT)
Dept: FAMILY MEDICINE CLINIC | Age: 53
End: 2023-05-03
Payer: COMMERCIAL

## 2023-05-03 VITALS
BODY MASS INDEX: 25.68 KG/M2 | WEIGHT: 179.4 LBS | DIASTOLIC BLOOD PRESSURE: 74 MMHG | SYSTOLIC BLOOD PRESSURE: 132 MMHG | TEMPERATURE: 97 F | OXYGEN SATURATION: 97 % | RESPIRATION RATE: 18 BRPM | HEART RATE: 63 BPM

## 2023-05-03 DIAGNOSIS — I25.10 MULTIPLE VESSEL CORONARY ARTERY DISEASE: ICD-10-CM

## 2023-05-03 DIAGNOSIS — Z01.818 PRE-OPERATIVE CLEARANCE: Primary | ICD-10-CM

## 2023-05-03 DIAGNOSIS — E78.49 OTHER HYPERLIPIDEMIA: ICD-10-CM

## 2023-05-03 DIAGNOSIS — M48.07 LUMBOSACRAL SPINAL STENOSIS: ICD-10-CM

## 2023-05-03 DIAGNOSIS — I10 PRIMARY HYPERTENSION: ICD-10-CM

## 2023-05-03 DIAGNOSIS — E11.9 TYPE 2 DIABETES MELLITUS WITHOUT COMPLICATION, WITHOUT LONG-TERM CURRENT USE OF INSULIN (HCC): ICD-10-CM

## 2023-05-03 DIAGNOSIS — Z72.0 TOBACCO USE: ICD-10-CM

## 2023-05-03 DIAGNOSIS — I44.7 INCOMPLETE LEFT BUNDLE BRANCH BLOCK: ICD-10-CM

## 2023-05-03 DIAGNOSIS — K21.9 GASTROESOPHAGEAL REFLUX DISEASE WITHOUT ESOPHAGITIS: ICD-10-CM

## 2023-05-03 PROCEDURE — 99214 OFFICE O/P EST MOD 30 MIN: CPT | Performed by: STUDENT IN AN ORGANIZED HEALTH CARE EDUCATION/TRAINING PROGRAM

## 2023-05-03 PROCEDURE — 3078F DIAST BP <80 MM HG: CPT | Performed by: STUDENT IN AN ORGANIZED HEALTH CARE EDUCATION/TRAINING PROGRAM

## 2023-05-03 PROCEDURE — 93000 ELECTROCARDIOGRAM COMPLETE: CPT | Performed by: STUDENT IN AN ORGANIZED HEALTH CARE EDUCATION/TRAINING PROGRAM

## 2023-05-03 PROCEDURE — 3051F HG A1C>EQUAL 7.0%<8.0%: CPT | Performed by: STUDENT IN AN ORGANIZED HEALTH CARE EDUCATION/TRAINING PROGRAM

## 2023-05-03 PROCEDURE — 3075F SYST BP GE 130 - 139MM HG: CPT | Performed by: STUDENT IN AN ORGANIZED HEALTH CARE EDUCATION/TRAINING PROGRAM

## 2023-05-03 ASSESSMENT — PATIENT HEALTH QUESTIONNAIRE - PHQ9
1. LITTLE INTEREST OR PLEASURE IN DOING THINGS: 0
2. FEELING DOWN, DEPRESSED OR HOPELESS: 0
SUM OF ALL RESPONSES TO PHQ QUESTIONS 1-9: 0
SUM OF ALL RESPONSES TO PHQ9 QUESTIONS 1 & 2: 0
SUM OF ALL RESPONSES TO PHQ QUESTIONS 1-9: 0

## 2023-05-03 NOTE — PROGRESS NOTES
Plan:      1. Preoperative workup as follows: CBC, BMP, PT/PTT, MRSA swab as above. Patient's laboratory evaluation did show mild anemia most likely from macrocytosis due to B12 and folate deficiency but no need for adjustment at this time prior to surgery. Patient also has chronic hyponatremia that is stable. No acute changes. Hemoglobin A1c mildly elevated above 7% but stable. No need for medication adjustment prior to surgery.  -Patients metabolic equivalents score is less than 4 due to shortness of breath with climbing up flight of stairs. He still able to perform everyday activities/activities of daily living. He still able to do housework without shortness of breath. Most likely due to deconditioning secondary to chronic back pain. No concern for cardiac etiology at this time. Based off of history, physical examination, preoperative testing patient is at Moderate risk for surgical complication at this time. Risks and benefits were discussed in detail with patient, patient verbalizes understanding of those risks and is interested in proceeding with surgery. Plan to follow-up with surgery as scheduled above. 2. Change in medication regimen before surgery: stop aspirin 7 days before, stop plavix 2-3 days before, discontinue all nsaids 7 days before, and do not take HCTZ morning of surgery  3. Prophylaxis for cardiac events with perioperative beta-blockers: should be considered, specific regimen per anesthesia  4. Invasive hemodynamic monitoring perioperatively: at the discretion of anesthesiologist  5. Deep vein thrombosis prophylaxis postoperatively:regimen to be chosen by surgical team  6.  Surveillance for postoperative MI with ECG immediately postoperatively and on postoperative days 1 and 2 AND troponin levels 24 hours postoperatively and on day 4 or hospital discharge (whichever comes first): at the discretion of anesthesiologist    Gavi Miranda DO    **This report has been created using voice

## 2023-05-04 DIAGNOSIS — I10 PRIMARY HYPERTENSION: ICD-10-CM

## 2023-05-04 LAB
CREATININE URINE: 42.2 MG/DL (ref 39–259)
MICROALBUMIN/CREAT 24H UR: 15 MG/L
MICROALBUMIN/CREAT UR-RTO: 36 MCG/MG CREAT

## 2023-05-19 ENCOUNTER — ANESTHESIA EVENT (OUTPATIENT)
Dept: OPERATING ROOM | Age: 53
End: 2023-05-19
Payer: COMMERCIAL

## 2023-05-19 ENCOUNTER — APPOINTMENT (OUTPATIENT)
Dept: GENERAL RADIOLOGY | Age: 53
DRG: 454 | End: 2023-05-19
Attending: ORTHOPAEDIC SURGERY
Payer: COMMERCIAL

## 2023-05-19 ENCOUNTER — ANESTHESIA (OUTPATIENT)
Dept: OPERATING ROOM | Age: 53
End: 2023-05-19
Payer: COMMERCIAL

## 2023-05-19 ENCOUNTER — HOSPITAL ENCOUNTER (INPATIENT)
Age: 53
LOS: 5 days | Discharge: HOME | DRG: 454 | End: 2023-05-24
Attending: ORTHOPAEDIC SURGERY | Admitting: ORTHOPAEDIC SURGERY
Payer: COMMERCIAL

## 2023-05-19 PROBLEM — M48.062 LUMBAR STENOSIS WITH NEUROGENIC CLAUDICATION: Status: ACTIVE | Noted: 2023-05-19

## 2023-05-19 LAB
ABO: NORMAL
ANTIBODY SCREEN: NORMAL
RH FACTOR: NORMAL

## 2023-05-19 PROCEDURE — 6370000000 HC RX 637 (ALT 250 FOR IP): Performed by: ANESTHESIOLOGY

## 2023-05-19 PROCEDURE — 6360000002 HC RX W HCPCS: Performed by: PHYSICIAN ASSISTANT

## 2023-05-19 PROCEDURE — 0SG3071 FUSION OF LUMBOSACRAL JOINT WITH AUTOLOGOUS TISSUE SUBSTITUTE, POSTERIOR APPROACH, POSTERIOR COLUMN, OPEN APPROACH: ICD-10-PCS | Performed by: ORTHOPAEDIC SURGERY

## 2023-05-19 PROCEDURE — 86900 BLOOD TYPING SEROLOGIC ABO: CPT

## 2023-05-19 PROCEDURE — 6360000002 HC RX W HCPCS: Performed by: NURSE ANESTHETIST, CERTIFIED REGISTERED

## 2023-05-19 PROCEDURE — 0SB40ZZ EXCISION OF LUMBOSACRAL DISC, OPEN APPROACH: ICD-10-PCS | Performed by: ORTHOPAEDIC SURGERY

## 2023-05-19 PROCEDURE — 2580000003 HC RX 258: Performed by: PHYSICIAN ASSISTANT

## 2023-05-19 PROCEDURE — 1200000000 HC SEMI PRIVATE

## 2023-05-19 PROCEDURE — 6370000000 HC RX 637 (ALT 250 FOR IP): Performed by: PHYSICIAN ASSISTANT

## 2023-05-19 PROCEDURE — 6360000002 HC RX W HCPCS

## 2023-05-19 PROCEDURE — 01NB0ZZ RELEASE LUMBAR NERVE, OPEN APPROACH: ICD-10-PCS | Performed by: ORTHOPAEDIC SURGERY

## 2023-05-19 PROCEDURE — 6360000002 HC RX W HCPCS: Performed by: ORTHOPAEDIC SURGERY

## 2023-05-19 PROCEDURE — 86901 BLOOD TYPING SEROLOGIC RH(D): CPT

## 2023-05-19 PROCEDURE — C1889 IMPLANT/INSERT DEVICE, NOC: HCPCS | Performed by: ORTHOPAEDIC SURGERY

## 2023-05-19 PROCEDURE — 0SG1071 FUSION OF 2 OR MORE LUMBAR VERTEBRAL JOINTS WITH AUTOLOGOUS TISSUE SUBSTITUTE, POSTERIOR APPROACH, POSTERIOR COLUMN, OPEN APPROACH: ICD-10-PCS | Performed by: ORTHOPAEDIC SURGERY

## 2023-05-19 PROCEDURE — C1713 ANCHOR/SCREW BN/BN,TIS/BN: HCPCS | Performed by: ORTHOPAEDIC SURGERY

## 2023-05-19 PROCEDURE — 0SG30AJ FUSION OF LUMBOSACRAL JOINT WITH INTERBODY FUSION DEVICE, POSTERIOR APPROACH, ANTERIOR COLUMN, OPEN APPROACH: ICD-10-PCS | Performed by: ORTHOPAEDIC SURGERY

## 2023-05-19 PROCEDURE — 3600000004 HC SURGERY LEVEL 4 BASE: Performed by: ORTHOPAEDIC SURGERY

## 2023-05-19 PROCEDURE — 6360000002 HC RX W HCPCS: Performed by: ANESTHESIOLOGY

## 2023-05-19 PROCEDURE — 2500000003 HC RX 250 WO HCPCS: Performed by: ANESTHESIOLOGY

## 2023-05-19 PROCEDURE — 3600000014 HC SURGERY LEVEL 4 ADDTL 15MIN: Performed by: ORTHOPAEDIC SURGERY

## 2023-05-19 PROCEDURE — 2500000003 HC RX 250 WO HCPCS: Performed by: NURSE ANESTHETIST, CERTIFIED REGISTERED

## 2023-05-19 PROCEDURE — 3700000000 HC ANESTHESIA ATTENDED CARE: Performed by: ORTHOPAEDIC SURGERY

## 2023-05-19 PROCEDURE — 2709999900 HC NON-CHARGEABLE SUPPLY: Performed by: ORTHOPAEDIC SURGERY

## 2023-05-19 PROCEDURE — 7100000001 HC PACU RECOVERY - ADDTL 15 MIN: Performed by: ORTHOPAEDIC SURGERY

## 2023-05-19 PROCEDURE — 86850 RBC ANTIBODY SCREEN: CPT

## 2023-05-19 PROCEDURE — 2720000010 HC SURG SUPPLY STERILE: Performed by: ORTHOPAEDIC SURGERY

## 2023-05-19 PROCEDURE — 7100000000 HC PACU RECOVERY - FIRST 15 MIN: Performed by: ORTHOPAEDIC SURGERY

## 2023-05-19 PROCEDURE — 72020 X-RAY EXAM OF SPINE 1 VIEW: CPT

## 2023-05-19 PROCEDURE — 36415 COLL VENOUS BLD VENIPUNCTURE: CPT

## 2023-05-19 PROCEDURE — 3700000001 HC ADD 15 MINUTES (ANESTHESIA): Performed by: ORTHOPAEDIC SURGERY

## 2023-05-19 DEVICE — SCREW, POLY, SOLID, 7.5X45
Type: IMPLANTABLE DEVICE | Site: SPINE LUMBAR | Status: FUNCTIONAL
Brand: CORTERA

## 2023-05-19 DEVICE — ROD, TI, PREBENT, 5.5X90
Type: IMPLANTABLE DEVICE | Site: SPINE LUMBAR | Status: FUNCTIONAL
Brand: CORTERA

## 2023-05-19 DEVICE — IMPLANTABLE DEVICE: Type: IMPLANTABLE DEVICE | Site: SPINE LUMBAR | Status: FUNCTIONAL

## 2023-05-19 DEVICE — ALLOGRAFT BNE BRIDGE 50X25 MM 24 CC BIOADAPT: Type: IMPLANTABLE DEVICE | Site: SPINE LUMBAR | Status: FUNCTIONAL

## 2023-05-19 DEVICE — SCREW, POLY, SOLID, 7.5X50
Type: IMPLANTABLE DEVICE | Site: SPINE LUMBAR | Status: FUNCTIONAL
Brand: CORTERA

## 2023-05-19 DEVICE — CONNECTOR SPNL M STREAMLINE TL VAR X LINK: Type: IMPLANTABLE DEVICE | Site: SPINE LUMBAR | Status: FUNCTIONAL

## 2023-05-19 DEVICE — SET SCREW, 5.5-6.0
Type: IMPLANTABLE DEVICE | Site: SPINE LUMBAR | Status: FUNCTIONAL
Brand: CORTERA

## 2023-05-19 RX ORDER — POLYETHYLENE GLYCOL 3350 17 G/17G
17 POWDER, FOR SOLUTION ORAL DAILY
Status: DISCONTINUED | OUTPATIENT
Start: 2023-05-19 | End: 2023-05-24 | Stop reason: HOSPADM

## 2023-05-19 RX ORDER — ONDANSETRON 2 MG/ML
4 INJECTION INTRAMUSCULAR; INTRAVENOUS EVERY 6 HOURS PRN
Status: DISCONTINUED | OUTPATIENT
Start: 2023-05-19 | End: 2023-05-24 | Stop reason: HOSPADM

## 2023-05-19 RX ORDER — MORPHINE SULFATE 2 MG/ML
2 INJECTION, SOLUTION INTRAMUSCULAR; INTRAVENOUS
Status: ACTIVE | OUTPATIENT
Start: 2023-05-19 | End: 2023-05-20

## 2023-05-19 RX ORDER — MORPHINE SULFATE 4 MG/ML
4 INJECTION, SOLUTION INTRAMUSCULAR; INTRAVENOUS
Status: DISPENSED | OUTPATIENT
Start: 2023-05-19 | End: 2023-05-20

## 2023-05-19 RX ORDER — DIPHENHYDRAMINE HCL 25 MG
25 TABLET ORAL EVERY 8 HOURS PRN
Status: DISCONTINUED | OUTPATIENT
Start: 2023-05-19 | End: 2023-05-24 | Stop reason: HOSPADM

## 2023-05-19 RX ORDER — SODIUM CHLORIDE 0.9 % (FLUSH) 0.9 %
5-40 SYRINGE (ML) INJECTION PRN
Status: DISCONTINUED | OUTPATIENT
Start: 2023-05-19 | End: 2023-05-24 | Stop reason: HOSPADM

## 2023-05-19 RX ORDER — VANCOMYCIN HYDROCHLORIDE 1 G/20ML
INJECTION, POWDER, LYOPHILIZED, FOR SOLUTION INTRAVENOUS PRN
Status: DISCONTINUED | OUTPATIENT
Start: 2023-05-19 | End: 2023-05-19 | Stop reason: ALTCHOICE

## 2023-05-19 RX ORDER — LISINOPRIL 40 MG/1
40 TABLET ORAL DAILY
Status: DISCONTINUED | OUTPATIENT
Start: 2023-05-19 | End: 2023-05-24 | Stop reason: HOSPADM

## 2023-05-19 RX ORDER — HYDRALAZINE HYDROCHLORIDE 20 MG/ML
10 INJECTION INTRAMUSCULAR; INTRAVENOUS
Status: DISCONTINUED | OUTPATIENT
Start: 2023-05-19 | End: 2023-05-19 | Stop reason: HOSPADM

## 2023-05-19 RX ORDER — SODIUM CHLORIDE 0.9 % (FLUSH) 0.9 %
5-40 SYRINGE (ML) INJECTION EVERY 12 HOURS SCHEDULED
Status: DISCONTINUED | OUTPATIENT
Start: 2023-05-19 | End: 2023-05-19 | Stop reason: HOSPADM

## 2023-05-19 RX ORDER — FENTANYL CITRATE 50 UG/ML
INJECTION, SOLUTION INTRAMUSCULAR; INTRAVENOUS PRN
Status: DISCONTINUED | OUTPATIENT
Start: 2023-05-19 | End: 2023-05-19 | Stop reason: SDUPTHER

## 2023-05-19 RX ORDER — SODIUM CHLORIDE 9 MG/ML
INJECTION, SOLUTION INTRAVENOUS PRN
Status: DISCONTINUED | OUTPATIENT
Start: 2023-05-19 | End: 2023-05-19 | Stop reason: HOSPADM

## 2023-05-19 RX ORDER — FENTANYL CITRATE 50 UG/ML
INJECTION, SOLUTION INTRAMUSCULAR; INTRAVENOUS
Status: COMPLETED
Start: 2023-05-19 | End: 2023-05-19

## 2023-05-19 RX ORDER — SODIUM CHLORIDE 0.9 % (FLUSH) 0.9 %
5-40 SYRINGE (ML) INJECTION PRN
Status: DISCONTINUED | OUTPATIENT
Start: 2023-05-19 | End: 2023-05-19 | Stop reason: HOSPADM

## 2023-05-19 RX ORDER — ACETAMINOPHEN 325 MG/1
650 TABLET ORAL EVERY 6 HOURS PRN
Status: DISCONTINUED | OUTPATIENT
Start: 2023-05-19 | End: 2023-05-24 | Stop reason: HOSPADM

## 2023-05-19 RX ORDER — SODIUM CHLORIDE 0.9 % (FLUSH) 0.9 %
5-40 SYRINGE (ML) INJECTION EVERY 12 HOURS SCHEDULED
Status: DISCONTINUED | OUTPATIENT
Start: 2023-05-19 | End: 2023-05-24 | Stop reason: HOSPADM

## 2023-05-19 RX ORDER — PROPOFOL 10 MG/ML
INJECTION, EMULSION INTRAVENOUS PRN
Status: DISCONTINUED | OUTPATIENT
Start: 2023-05-19 | End: 2023-05-19 | Stop reason: SDUPTHER

## 2023-05-19 RX ORDER — ATORVASTATIN CALCIUM 40 MG/1
40 TABLET, FILM COATED ORAL NIGHTLY
Status: DISCONTINUED | OUTPATIENT
Start: 2023-05-19 | End: 2023-05-24 | Stop reason: HOSPADM

## 2023-05-19 RX ORDER — ONDANSETRON 2 MG/ML
4 INJECTION INTRAMUSCULAR; INTRAVENOUS
Status: DISCONTINUED | OUTPATIENT
Start: 2023-05-19 | End: 2023-05-19 | Stop reason: HOSPADM

## 2023-05-19 RX ORDER — PANTOPRAZOLE SODIUM 40 MG/1
40 TABLET, DELAYED RELEASE ORAL
Status: DISCONTINUED | OUTPATIENT
Start: 2023-05-20 | End: 2023-05-24 | Stop reason: HOSPADM

## 2023-05-19 RX ORDER — BISACODYL 5 MG/1
5 TABLET, DELAYED RELEASE ORAL DAILY
Status: DISCONTINUED | OUTPATIENT
Start: 2023-05-19 | End: 2023-05-24 | Stop reason: HOSPADM

## 2023-05-19 RX ORDER — DEXAMETHASONE SODIUM PHOSPHATE 4 MG/ML
INJECTION, SOLUTION INTRA-ARTICULAR; INTRALESIONAL; INTRAMUSCULAR; INTRAVENOUS; SOFT TISSUE PRN
Status: DISCONTINUED | OUTPATIENT
Start: 2023-05-19 | End: 2023-05-19 | Stop reason: SDUPTHER

## 2023-05-19 RX ORDER — SODIUM CHLORIDE 9 MG/ML
INJECTION, SOLUTION INTRAVENOUS CONTINUOUS
Status: DISCONTINUED | OUTPATIENT
Start: 2023-05-19 | End: 2023-05-24 | Stop reason: HOSPADM

## 2023-05-19 RX ORDER — LIDOCAINE HYDROCHLORIDE 20 MG/ML
INJECTION, SOLUTION INTRAVENOUS PRN
Status: DISCONTINUED | OUTPATIENT
Start: 2023-05-19 | End: 2023-05-19 | Stop reason: SDUPTHER

## 2023-05-19 RX ORDER — CYCLOBENZAPRINE HCL 10 MG
10 TABLET ORAL 3 TIMES DAILY PRN
Status: DISCONTINUED | OUTPATIENT
Start: 2023-05-19 | End: 2023-05-24 | Stop reason: HOSPADM

## 2023-05-19 RX ORDER — ROCURONIUM BROMIDE 10 MG/ML
INJECTION, SOLUTION INTRAVENOUS PRN
Status: DISCONTINUED | OUTPATIENT
Start: 2023-05-19 | End: 2023-05-19 | Stop reason: SDUPTHER

## 2023-05-19 RX ORDER — OXYCODONE HYDROCHLORIDE 5 MG/1
10 TABLET ORAL EVERY 6 HOURS PRN
Status: DISCONTINUED | OUTPATIENT
Start: 2023-05-19 | End: 2023-05-24 | Stop reason: HOSPADM

## 2023-05-19 RX ORDER — FENTANYL CITRATE 50 UG/ML
50 INJECTION, SOLUTION INTRAMUSCULAR; INTRAVENOUS EVERY 5 MIN PRN
Status: COMPLETED | OUTPATIENT
Start: 2023-05-19 | End: 2023-05-19

## 2023-05-19 RX ORDER — BISACODYL 10 MG
10 SUPPOSITORY, RECTAL RECTAL DAILY PRN
Status: DISCONTINUED | OUTPATIENT
Start: 2023-05-19 | End: 2023-05-24 | Stop reason: HOSPADM

## 2023-05-19 RX ORDER — SENNA AND DOCUSATE SODIUM 50; 8.6 MG/1; MG/1
1 TABLET, FILM COATED ORAL 2 TIMES DAILY
Status: DISCONTINUED | OUTPATIENT
Start: 2023-05-19 | End: 2023-05-24 | Stop reason: HOSPADM

## 2023-05-19 RX ORDER — SODIUM CHLORIDE 9 MG/ML
INJECTION, SOLUTION INTRAVENOUS PRN
Status: DISCONTINUED | OUTPATIENT
Start: 2023-05-19 | End: 2023-05-24 | Stop reason: HOSPADM

## 2023-05-19 RX ORDER — LABETALOL HYDROCHLORIDE 5 MG/ML
10 INJECTION, SOLUTION INTRAVENOUS
Status: DISCONTINUED | OUTPATIENT
Start: 2023-05-19 | End: 2023-05-19 | Stop reason: HOSPADM

## 2023-05-19 RX ORDER — AMLODIPINE BESYLATE 10 MG/1
10 TABLET ORAL DAILY
Status: DISCONTINUED | OUTPATIENT
Start: 2023-05-19 | End: 2023-05-24 | Stop reason: HOSPADM

## 2023-05-19 RX ORDER — OXYCODONE HYDROCHLORIDE 5 MG/1
5 TABLET ORAL EVERY 6 HOURS PRN
Status: DISCONTINUED | OUTPATIENT
Start: 2023-05-19 | End: 2023-05-24 | Stop reason: HOSPADM

## 2023-05-19 RX ORDER — ONDANSETRON 4 MG/1
4 TABLET, ORALLY DISINTEGRATING ORAL EVERY 8 HOURS PRN
Status: DISCONTINUED | OUTPATIENT
Start: 2023-05-19 | End: 2023-05-24 | Stop reason: HOSPADM

## 2023-05-19 RX ORDER — ONDANSETRON 2 MG/ML
INJECTION INTRAMUSCULAR; INTRAVENOUS PRN
Status: DISCONTINUED | OUTPATIENT
Start: 2023-05-19 | End: 2023-05-19 | Stop reason: SDUPTHER

## 2023-05-19 RX ORDER — HYDROCHLOROTHIAZIDE 25 MG/1
25 TABLET ORAL EVERY MORNING
Status: DISCONTINUED | OUTPATIENT
Start: 2023-05-20 | End: 2023-05-24 | Stop reason: HOSPADM

## 2023-05-19 RX ADMIN — PHENYLEPHRINE HYDROCHLORIDE 100 MCG: 10 INJECTION INTRAVENOUS at 12:45

## 2023-05-19 RX ADMIN — FENTANYL CITRATE 50 MCG: 50 INJECTION, SOLUTION INTRAMUSCULAR; INTRAVENOUS at 15:10

## 2023-05-19 RX ADMIN — FENTANYL CITRATE 50 MCG: 50 INJECTION, SOLUTION INTRAMUSCULAR; INTRAVENOUS at 15:25

## 2023-05-19 RX ADMIN — PROPOFOL 120 MG: 10 INJECTION, EMULSION INTRAVENOUS at 11:27

## 2023-05-19 RX ADMIN — METOPROLOL TARTRATE 25 MG: 25 TABLET, FILM COATED ORAL at 20:57

## 2023-05-19 RX ADMIN — AMLODIPINE BESYLATE 10 MG: 10 TABLET ORAL at 18:04

## 2023-05-19 RX ADMIN — HYDROMORPHONE HYDROCHLORIDE 0.5 MG: 1 INJECTION, SOLUTION INTRAMUSCULAR; INTRAVENOUS; SUBCUTANEOUS at 15:00

## 2023-05-19 RX ADMIN — BISACODYL 5 MG: 5 TABLET, COATED ORAL at 18:04

## 2023-05-19 RX ADMIN — CYCLOBENZAPRINE 10 MG: 10 TABLET, FILM COATED ORAL at 15:46

## 2023-05-19 RX ADMIN — CEFAZOLIN 2000 MG: 10 INJECTION, POWDER, FOR SOLUTION INTRAVENOUS at 21:08

## 2023-05-19 RX ADMIN — PHENYLEPHRINE HYDROCHLORIDE 100 MCG: 10 INJECTION INTRAVENOUS at 14:10

## 2023-05-19 RX ADMIN — FENTANYL CITRATE 50 MCG: 50 INJECTION, SOLUTION INTRAMUSCULAR; INTRAVENOUS at 15:20

## 2023-05-19 RX ADMIN — FENTANYL CITRATE 50 MCG: 50 INJECTION, SOLUTION INTRAMUSCULAR; INTRAVENOUS at 15:15

## 2023-05-19 RX ADMIN — ROCURONIUM BROMIDE 50 MG: 10 INJECTION INTRAVENOUS at 11:27

## 2023-05-19 RX ADMIN — HYDROMORPHONE HYDROCHLORIDE 0.5 MG: 1 INJECTION, SOLUTION INTRAMUSCULAR; INTRAVENOUS; SUBCUTANEOUS at 14:50

## 2023-05-19 RX ADMIN — PHENYLEPHRINE HYDROCHLORIDE 100 MCG: 10 INJECTION INTRAVENOUS at 13:57

## 2023-05-19 RX ADMIN — PROPOFOL 50 MG: 10 INJECTION, EMULSION INTRAVENOUS at 13:57

## 2023-05-19 RX ADMIN — METOPROLOL TARTRATE 25 MG: 25 TABLET, FILM COATED ORAL at 09:49

## 2023-05-19 RX ADMIN — SODIUM CHLORIDE: 9 INJECTION, SOLUTION INTRAVENOUS at 14:37

## 2023-05-19 RX ADMIN — MORPHINE SULFATE 4 MG: 4 INJECTION, SOLUTION INTRAMUSCULAR; INTRAVENOUS at 20:57

## 2023-05-19 RX ADMIN — HYDROMORPHONE HYDROCHLORIDE 0.5 MG: 1 INJECTION, SOLUTION INTRAMUSCULAR; INTRAVENOUS; SUBCUTANEOUS at 14:55

## 2023-05-19 RX ADMIN — PHENYLEPHRINE HYDROCHLORIDE 100 MCG: 10 INJECTION INTRAVENOUS at 13:32

## 2023-05-19 RX ADMIN — DOCUSATE SODIUM 50 MG AND SENNOSIDES 8.6 MG 1 TABLET: 8.6; 5 TABLET, FILM COATED ORAL at 20:57

## 2023-05-19 RX ADMIN — LISINOPRIL 40 MG: 40 TABLET ORAL at 18:04

## 2023-05-19 RX ADMIN — SODIUM CHLORIDE: 9 INJECTION, SOLUTION INTRAVENOUS at 09:28

## 2023-05-19 RX ADMIN — DEXAMETHASONE SODIUM PHOSPHATE 10 MG: 4 INJECTION, SOLUTION INTRAMUSCULAR; INTRAVENOUS at 11:54

## 2023-05-19 RX ADMIN — SODIUM CHLORIDE: 9 INJECTION, SOLUTION INTRAVENOUS at 12:44

## 2023-05-19 RX ADMIN — Medication 2000 MG: at 11:32

## 2023-05-19 RX ADMIN — OXYCODONE 10 MG: 5 TABLET ORAL at 15:56

## 2023-05-19 RX ADMIN — FENTANYL CITRATE 150 MCG: 50 INJECTION INTRAMUSCULAR; INTRAVENOUS at 11:27

## 2023-05-19 RX ADMIN — ONDANSETRON 4 MG: 2 INJECTION INTRAMUSCULAR; INTRAVENOUS at 14:34

## 2023-05-19 RX ADMIN — POLYETHYLENE GLYCOL 3350 17 G: 17 POWDER, FOR SOLUTION ORAL at 18:06

## 2023-05-19 RX ADMIN — PHENYLEPHRINE HYDROCHLORIDE 100 MCG: 10 INJECTION INTRAVENOUS at 12:56

## 2023-05-19 RX ADMIN — LIDOCAINE HYDROCHLORIDE 100 MG: 20 INJECTION INTRAVENOUS at 11:27

## 2023-05-19 RX ADMIN — ATORVASTATIN CALCIUM 40 MG: 40 TABLET, FILM COATED ORAL at 20:57

## 2023-05-19 RX ADMIN — HYDROMORPHONE HYDROCHLORIDE 0.5 MG: 1 INJECTION, SOLUTION INTRAMUSCULAR; INTRAVENOUS; SUBCUTANEOUS at 15:05

## 2023-05-19 RX ADMIN — SUGAMMADEX 200 MG: 100 INJECTION, SOLUTION INTRAVENOUS at 14:34

## 2023-05-19 ASSESSMENT — PAIN DESCRIPTION - LOCATION
LOCATION: BACK

## 2023-05-19 ASSESSMENT — PAIN DESCRIPTION - ORIENTATION
ORIENTATION: MID;LOWER;LEFT;RIGHT
ORIENTATION: MID

## 2023-05-19 ASSESSMENT — PAIN DESCRIPTION - DESCRIPTORS
DESCRIPTORS: SHARP
DESCRIPTORS: ACHING;DISCOMFORT
DESCRIPTORS: SHARP

## 2023-05-19 ASSESSMENT — PAIN SCALES - GENERAL
PAINLEVEL_OUTOF10: 10
PAINLEVEL_OUTOF10: 8
PAINLEVEL_OUTOF10: 10
PAINLEVEL_OUTOF10: 9
PAINLEVEL_OUTOF10: 8

## 2023-05-19 ASSESSMENT — PAIN - FUNCTIONAL ASSESSMENT
PAIN_FUNCTIONAL_ASSESSMENT: PREVENTS OR INTERFERES SOME ACTIVE ACTIVITIES AND ADLS
PAIN_FUNCTIONAL_ASSESSMENT: 0-10

## 2023-05-19 ASSESSMENT — PAIN DESCRIPTION - PAIN TYPE: TYPE: SURGICAL PAIN

## 2023-05-19 ASSESSMENT — PAIN DESCRIPTION - FREQUENCY: FREQUENCY: CONTINUOUS

## 2023-05-19 ASSESSMENT — PAIN DESCRIPTION - ONSET: ONSET: ON-GOING

## 2023-05-19 NOTE — ANESTHESIA PROCEDURE NOTES
Arterial Line:    An arterial line was placed using surface landmarks, in the OR for the following indication(s): continuous blood pressure monitoring and blood sampling needed. A 20 gauge (size) (length), Arrow (type) catheter was placed, Seldinger technique used, into the right radial artery, secured by tape and Tegaderm. Anesthesia type: General    Events:  patient tolerated procedure well with no complications.   Anesthesiologist: José Manuel Verde MD  Performed: Anesthesiologist   Preanesthetic Checklist  Completed: patient identified, IV checked, site marked, risks and benefits discussed, surgical/procedural consents, equipment checked, pre-op evaluation, timeout performed, anesthesia consent given, oxygen available, monitors applied/VS acknowledged, fire risk safety assessment completed and verbalized and blood product R/B/A discussed and consented

## 2023-05-19 NOTE — ANESTHESIA PRE PROCEDURE
Department of Anesthesiology  Preprocedure Note       Name:  Estevan Reynoso   Age:  52 y.o.  :  1970                                          MRN:  [id number]         Date:  2023      Surgeon: Ashley Davenport):  Brenda Miller MD    Procedure: Procedure(s):  L2-S1 decompression, L2-S1 posterior fusion L5-S1 TLIF    Medications prior to admission:   Prior to Admission medications    Medication Sig Start Date End Date Taking?  Authorizing Provider   amLODIPine (NORVASC) 10 MG tablet Take 1 tablet by mouth daily 23   Alexandra Carrion, DO   metoprolol tartrate (LOPRESSOR) 25 MG tablet Take 1 tablet by mouth 2 times daily 23  Brenda Shallow D Law, DO   meloxicam (MOBIC) 7.5 MG tablet Take 1 tablet by mouth daily 23  Brenda Shallow D Law, DO   clopidogrel (PLAVIX) 75 MG tablet Take 1 tablet by mouth daily 3/29/23 6/27/23  Brenda Shallow D Law, DO   hydroCHLOROthiazide (HYDRODIURIL) 25 MG tablet Take 1 tablet by mouth every morning 3/15/23 6/13/23  Brenda Shallow D Law, DO   lisinopril (PRINIVIL;ZESTRIL) 20 MG tablet Take 2 tablets by mouth daily 23  Alexandra Carrion, DO   omeprazole (PRILOSEC) 20 MG delayed release capsule Take 2 capsules by mouth Daily 12/10/21 5/3/23  RACHEL Rosa CNP   aspirin 81 MG EC tablet Take 1 tablet by mouth daily 12/10/21 5/3/23  RACHEL Rosa CNP   atorvastatin (LIPITOR) 40 MG tablet Take 1 tablet by mouth nightly 12/10/21 5/3/23  RACHEL Rosa CNP   metoprolol tartrate (LOPRESSOR) 25 MG tablet Take 1 tablet by mouth 2 times daily  Patient not taking: Reported on 2021   Delores Voss PA-C       Current medications:    Current Facility-Administered Medications   Medication Dose Route Frequency Provider Last Rate Last Admin    sodium chloride flush 0.9 % injection 5-40 mL  5-40 mL IntraVENous 2 times per day ANURADHA Mosley        sodium chloride flush 0.9 % injection 5-40 mL  5-40 mL IntraVENous PRN ANURADHA Mosley       [address] [place] [address]

## 2023-05-19 NOTE — ANESTHESIA POSTPROCEDURE EVALUATION
Department of Anesthesiology  Postprocedure Note    Patient: Murphy Bryant  MRN: [id number]  YOB: 1970  Date of evaluation: 5/19/2023      Procedure Summary     Date: 05/19/23 Room / Location: [address] WILFRID Muñiz    Anesthesia Start: 1810 Anesthesia Stop: 1697    Procedure: L2-S1 decompression, L2-S1 posterior fusion L5-S1 TLIF (Spine Lumbar) Diagnosis:       Lumbosacral spinal stenosis      (Lumbosacral spinal stenosis [M48.07])    Surgeons: Nahid Arnold MD Responsible Provider: Val Hunt MD    Anesthesia Type: general ASA Status: 3          Anesthesia Type: No value filed.     Fausto Phase I:      Fausto Phase II:        Anesthesia Post Evaluation    Patient location during evaluation: PACU  Patient participation: complete - patient participated  Level of consciousness: awake  Airway patency: patent  Nausea & Vomiting: no nausea  Complications: no  Cardiovascular status: hemodynamically stable  Respiratory status: acceptable  Hydration status: stable

## 2023-05-20 ENCOUNTER — APPOINTMENT (OUTPATIENT)
Dept: CT IMAGING | Age: 53
DRG: 454 | End: 2023-05-20
Attending: ORTHOPAEDIC SURGERY
Payer: COMMERCIAL

## 2023-05-20 PROCEDURE — 97530 THERAPEUTIC ACTIVITIES: CPT

## 2023-05-20 PROCEDURE — 6360000002 HC RX W HCPCS: Performed by: PHYSICIAN ASSISTANT

## 2023-05-20 PROCEDURE — 1200000000 HC SEMI PRIVATE

## 2023-05-20 PROCEDURE — 51798 US URINE CAPACITY MEASURE: CPT

## 2023-05-20 PROCEDURE — 6370000000 HC RX 637 (ALT 250 FOR IP): Performed by: PHYSICIAN ASSISTANT

## 2023-05-20 PROCEDURE — 97166 OT EVAL MOD COMPLEX 45 MIN: CPT

## 2023-05-20 PROCEDURE — 97163 PT EVAL HIGH COMPLEX 45 MIN: CPT

## 2023-05-20 PROCEDURE — 6370000000 HC RX 637 (ALT 250 FOR IP): Performed by: ORTHOPAEDIC SURGERY

## 2023-05-20 PROCEDURE — 2580000003 HC RX 258: Performed by: PHYSICIAN ASSISTANT

## 2023-05-20 PROCEDURE — 72131 CT LUMBAR SPINE W/O DYE: CPT

## 2023-05-20 RX ADMIN — ONDANSETRON 4 MG: 2 INJECTION INTRAMUSCULAR; INTRAVENOUS at 12:57

## 2023-05-20 RX ADMIN — METOPROLOL TARTRATE 25 MG: 25 TABLET, FILM COATED ORAL at 09:08

## 2023-05-20 RX ADMIN — OXYCODONE 10 MG: 5 TABLET ORAL at 23:08

## 2023-05-20 RX ADMIN — HYDROCHLOROTHIAZIDE 25 MG: 25 TABLET ORAL at 09:08

## 2023-05-20 RX ADMIN — POLYETHYLENE GLYCOL 3350 17 G: 17 POWDER, FOR SOLUTION ORAL at 09:08

## 2023-05-20 RX ADMIN — DIPHENHYDRAMINE HYDROCHLORIDE 25 MG: 25 TABLET ORAL at 00:02

## 2023-05-20 RX ADMIN — DOCUSATE SODIUM 50 MG AND SENNOSIDES 8.6 MG 1 TABLET: 8.6; 5 TABLET, FILM COATED ORAL at 09:08

## 2023-05-20 RX ADMIN — DOCUSATE SODIUM 50 MG AND SENNOSIDES 8.6 MG 1 TABLET: 8.6; 5 TABLET, FILM COATED ORAL at 20:13

## 2023-05-20 RX ADMIN — CEFAZOLIN 2000 MG: 10 INJECTION, POWDER, FOR SOLUTION INTRAVENOUS at 05:13

## 2023-05-20 RX ADMIN — OXYCODONE 10 MG: 5 TABLET ORAL at 16:28

## 2023-05-20 RX ADMIN — CYCLOBENZAPRINE 10 MG: 10 TABLET, FILM COATED ORAL at 20:13

## 2023-05-20 RX ADMIN — OXYCODONE 10 MG: 5 TABLET ORAL at 09:08

## 2023-05-20 RX ADMIN — AMLODIPINE BESYLATE 10 MG: 10 TABLET ORAL at 09:07

## 2023-05-20 RX ADMIN — PANTOPRAZOLE SODIUM 40 MG: 40 TABLET, DELAYED RELEASE ORAL at 05:14

## 2023-05-20 RX ADMIN — ATORVASTATIN CALCIUM 40 MG: 40 TABLET, FILM COATED ORAL at 20:13

## 2023-05-20 RX ADMIN — DIPHENHYDRAMINE HYDROCHLORIDE 25 MG: 25 TABLET ORAL at 12:57

## 2023-05-20 RX ADMIN — LISINOPRIL 40 MG: 40 TABLET ORAL at 09:08

## 2023-05-20 RX ADMIN — SODIUM CHLORIDE: 9 INJECTION, SOLUTION INTRAVENOUS at 20:17

## 2023-05-20 RX ADMIN — BISACODYL 5 MG: 5 TABLET, COATED ORAL at 09:07

## 2023-05-20 ASSESSMENT — PAIN DESCRIPTION - DESCRIPTORS
DESCRIPTORS: ACHING
DESCRIPTORS: ACHING;SHARP
DESCRIPTORS: ACHING

## 2023-05-20 ASSESSMENT — PAIN DESCRIPTION - ONSET
ONSET: ON-GOING
ONSET: ON-GOING

## 2023-05-20 ASSESSMENT — PAIN DESCRIPTION - FREQUENCY
FREQUENCY: CONTINUOUS
FREQUENCY: CONTINUOUS

## 2023-05-20 ASSESSMENT — PAIN DESCRIPTION - PAIN TYPE
TYPE: SURGICAL PAIN
TYPE: SURGICAL PAIN

## 2023-05-20 ASSESSMENT — PAIN DESCRIPTION - LOCATION
LOCATION: BACK

## 2023-05-20 ASSESSMENT — PAIN DESCRIPTION - ORIENTATION
ORIENTATION: LOWER

## 2023-05-20 ASSESSMENT — PAIN SCALES - GENERAL
PAINLEVEL_OUTOF10: 10
PAINLEVEL_OUTOF10: 8
PAINLEVEL_OUTOF10: 9
PAINLEVEL_OUTOF10: 8
PAINLEVEL_OUTOF10: 8

## 2023-05-20 ASSESSMENT — PAIN - FUNCTIONAL ASSESSMENT: PAIN_FUNCTIONAL_ASSESSMENT: PREVENTS OR INTERFERES SOME ACTIVE ACTIVITIES AND ADLS

## 2023-05-21 LAB
ANION GAP SERPL CALC-SCNC: 9 MEQ/L (ref 8–16)
BUN SERPL-MCNC: 13 MG/DL (ref 7–22)
CALCIUM SERPL-MCNC: 8.6 MG/DL (ref 8.5–10.5)
CHLORIDE SERPL-SCNC: 100 MEQ/L (ref 98–111)
CO2 SERPL-SCNC: 21 MEQ/L (ref 23–33)
CREAT SERPL-MCNC: 0.7 MG/DL (ref 0.4–1.2)
GFR SERPL CREATININE-BSD FRML MDRD: > 60 ML/MIN/1.73M2
GLUCOSE SERPL-MCNC: 118 MG/DL (ref 70–108)
HCT VFR BLD AUTO: 25.4 % (ref 42–52)
HGB BLD-MCNC: 8.3 GM/DL (ref 14–18)
POTASSIUM SERPL-SCNC: 4 MEQ/L (ref 3.5–5.2)
SODIUM SERPL-SCNC: 130 MEQ/L (ref 135–145)

## 2023-05-21 PROCEDURE — 85014 HEMATOCRIT: CPT

## 2023-05-21 PROCEDURE — 6370000000 HC RX 637 (ALT 250 FOR IP): Performed by: PHYSICIAN ASSISTANT

## 2023-05-21 PROCEDURE — 80048 BASIC METABOLIC PNL TOTAL CA: CPT

## 2023-05-21 PROCEDURE — 85018 HEMOGLOBIN: CPT

## 2023-05-21 PROCEDURE — 1200000000 HC SEMI PRIVATE

## 2023-05-21 PROCEDURE — 2580000003 HC RX 258: Performed by: PHYSICIAN ASSISTANT

## 2023-05-21 PROCEDURE — 36415 COLL VENOUS BLD VENIPUNCTURE: CPT

## 2023-05-21 RX ADMIN — AMLODIPINE BESYLATE 10 MG: 10 TABLET ORAL at 10:35

## 2023-05-21 RX ADMIN — METOPROLOL TARTRATE 25 MG: 25 TABLET, FILM COATED ORAL at 10:35

## 2023-05-21 RX ADMIN — DOCUSATE SODIUM 50 MG AND SENNOSIDES 8.6 MG 1 TABLET: 8.6; 5 TABLET, FILM COATED ORAL at 19:45

## 2023-05-21 RX ADMIN — CYCLOBENZAPRINE 10 MG: 10 TABLET, FILM COATED ORAL at 19:45

## 2023-05-21 RX ADMIN — PANTOPRAZOLE SODIUM 40 MG: 40 TABLET, DELAYED RELEASE ORAL at 05:06

## 2023-05-21 RX ADMIN — POLYETHYLENE GLYCOL 3350 17 G: 17 POWDER, FOR SOLUTION ORAL at 10:35

## 2023-05-21 RX ADMIN — ATORVASTATIN CALCIUM 40 MG: 40 TABLET, FILM COATED ORAL at 19:47

## 2023-05-21 RX ADMIN — SODIUM CHLORIDE: 9 INJECTION, SOLUTION INTRAVENOUS at 22:27

## 2023-05-21 RX ADMIN — OXYCODONE 10 MG: 5 TABLET ORAL at 17:59

## 2023-05-21 RX ADMIN — SODIUM CHLORIDE: 9 INJECTION, SOLUTION INTRAVENOUS at 13:59

## 2023-05-21 RX ADMIN — BISACODYL 5 MG: 5 TABLET, COATED ORAL at 10:35

## 2023-05-21 RX ADMIN — SODIUM CHLORIDE, PRESERVATIVE FREE 10 ML: 5 INJECTION INTRAVENOUS at 10:38

## 2023-05-21 RX ADMIN — OXYCODONE 10 MG: 5 TABLET ORAL at 05:06

## 2023-05-21 RX ADMIN — METOPROLOL TARTRATE 25 MG: 25 TABLET, FILM COATED ORAL at 19:47

## 2023-05-21 RX ADMIN — CYCLOBENZAPRINE 10 MG: 10 TABLET, FILM COATED ORAL at 08:41

## 2023-05-21 RX ADMIN — OXYCODONE 10 MG: 5 TABLET ORAL at 11:22

## 2023-05-21 RX ADMIN — SODIUM CHLORIDE: 9 INJECTION, SOLUTION INTRAVENOUS at 04:16

## 2023-05-21 RX ADMIN — DOCUSATE SODIUM 50 MG AND SENNOSIDES 8.6 MG 1 TABLET: 8.6; 5 TABLET, FILM COATED ORAL at 11:24

## 2023-05-21 RX ADMIN — HYDROCHLOROTHIAZIDE 25 MG: 25 TABLET ORAL at 10:35

## 2023-05-21 RX ADMIN — LISINOPRIL 40 MG: 40 TABLET ORAL at 10:35

## 2023-05-21 ASSESSMENT — PAIN DESCRIPTION - LOCATION
LOCATION: BACK

## 2023-05-21 ASSESSMENT — PAIN - FUNCTIONAL ASSESSMENT: PAIN_FUNCTIONAL_ASSESSMENT: PREVENTS OR INTERFERES SOME ACTIVE ACTIVITIES AND ADLS

## 2023-05-21 ASSESSMENT — PAIN DESCRIPTION - DESCRIPTORS
DESCRIPTORS: ACHING
DESCRIPTORS: ACHING;DISCOMFORT;SPASM
DESCRIPTORS: ACHING

## 2023-05-21 ASSESSMENT — PAIN SCALES - GENERAL
PAINLEVEL_OUTOF10: 9
PAINLEVEL_OUTOF10: 8
PAINLEVEL_OUTOF10: 9
PAINLEVEL_OUTOF10: 10

## 2023-05-21 ASSESSMENT — PAIN DESCRIPTION - FREQUENCY
FREQUENCY: CONTINUOUS
FREQUENCY: CONTINUOUS

## 2023-05-21 ASSESSMENT — PAIN DESCRIPTION - PAIN TYPE
TYPE: SURGICAL PAIN
TYPE: SURGICAL PAIN

## 2023-05-21 ASSESSMENT — PAIN DESCRIPTION - ORIENTATION
ORIENTATION: RIGHT;LEFT
ORIENTATION: RIGHT;LEFT;LOWER
ORIENTATION: LEFT;RIGHT
ORIENTATION: LOWER;UPPER
ORIENTATION: RIGHT;LEFT

## 2023-05-21 ASSESSMENT — PAIN DESCRIPTION - ONSET: ONSET: ON-GOING

## 2023-05-21 ASSESSMENT — PAIN SCALES - WONG BAKER
WONGBAKER_NUMERICALRESPONSE: 2
WONGBAKER_NUMERICALRESPONSE: 2

## 2023-05-22 LAB
ANION GAP SERPL CALC-SCNC: 9 MEQ/L (ref 8–16)
BUN SERPL-MCNC: 10 MG/DL (ref 7–22)
CALCIUM SERPL-MCNC: 8.7 MG/DL (ref 8.5–10.5)
CHLORIDE SERPL-SCNC: 96 MEQ/L (ref 98–111)
CO2 SERPL-SCNC: 23 MEQ/L (ref 23–33)
CREAT SERPL-MCNC: 0.7 MG/DL (ref 0.4–1.2)
GFR SERPL CREATININE-BSD FRML MDRD: > 60 ML/MIN/1.73M2
GLUCOSE SERPL-MCNC: 132 MG/DL (ref 70–108)
HCT VFR BLD AUTO: 23.6 % (ref 42–52)
HGB BLD-MCNC: 7.9 GM/DL (ref 14–18)
POTASSIUM SERPL-SCNC: 3.9 MEQ/L (ref 3.5–5.2)
SODIUM SERPL-SCNC: 128 MEQ/L (ref 135–145)

## 2023-05-22 PROCEDURE — 6370000000 HC RX 637 (ALT 250 FOR IP): Performed by: PHYSICIAN ASSISTANT

## 2023-05-22 PROCEDURE — 2580000003 HC RX 258: Performed by: PHYSICIAN ASSISTANT

## 2023-05-22 PROCEDURE — 85014 HEMATOCRIT: CPT

## 2023-05-22 PROCEDURE — 1200000000 HC SEMI PRIVATE

## 2023-05-22 PROCEDURE — 80048 BASIC METABOLIC PNL TOTAL CA: CPT

## 2023-05-22 PROCEDURE — 97116 GAIT TRAINING THERAPY: CPT

## 2023-05-22 PROCEDURE — 85018 HEMOGLOBIN: CPT

## 2023-05-22 PROCEDURE — 97535 SELF CARE MNGMENT TRAINING: CPT

## 2023-05-22 PROCEDURE — 36415 COLL VENOUS BLD VENIPUNCTURE: CPT

## 2023-05-22 RX ORDER — POLYETHYLENE GLYCOL 3350 17 G/17G
17 POWDER, FOR SOLUTION ORAL DAILY PRN
Status: DISCONTINUED | OUTPATIENT
Start: 2023-05-22 | End: 2023-05-24 | Stop reason: HOSPADM

## 2023-05-22 RX ADMIN — DOCUSATE SODIUM 50 MG AND SENNOSIDES 8.6 MG 1 TABLET: 8.6; 5 TABLET, FILM COATED ORAL at 21:34

## 2023-05-22 RX ADMIN — OXYCODONE 10 MG: 5 TABLET ORAL at 23:38

## 2023-05-22 RX ADMIN — PANTOPRAZOLE SODIUM 40 MG: 40 TABLET, DELAYED RELEASE ORAL at 06:10

## 2023-05-22 RX ADMIN — CYCLOBENZAPRINE 10 MG: 10 TABLET, FILM COATED ORAL at 21:33

## 2023-05-22 RX ADMIN — SODIUM CHLORIDE: 9 INJECTION, SOLUTION INTRAVENOUS at 07:34

## 2023-05-22 RX ADMIN — METOPROLOL TARTRATE 25 MG: 25 TABLET, FILM COATED ORAL at 09:21

## 2023-05-22 RX ADMIN — BISACODYL 5 MG: 5 TABLET, COATED ORAL at 09:21

## 2023-05-22 RX ADMIN — DOCUSATE SODIUM 50 MG AND SENNOSIDES 8.6 MG 1 TABLET: 8.6; 5 TABLET, FILM COATED ORAL at 09:29

## 2023-05-22 RX ADMIN — OXYCODONE 10 MG: 5 TABLET ORAL at 16:35

## 2023-05-22 RX ADMIN — POLYETHYLENE GLYCOL 3350 17 G: 17 POWDER, FOR SOLUTION ORAL at 09:29

## 2023-05-22 RX ADMIN — AMLODIPINE BESYLATE 10 MG: 10 TABLET ORAL at 09:22

## 2023-05-22 RX ADMIN — HYDROCHLOROTHIAZIDE 25 MG: 25 TABLET ORAL at 09:21

## 2023-05-22 RX ADMIN — CYCLOBENZAPRINE 10 MG: 10 TABLET, FILM COATED ORAL at 04:20

## 2023-05-22 RX ADMIN — ATORVASTATIN CALCIUM 40 MG: 40 TABLET, FILM COATED ORAL at 21:33

## 2023-05-22 RX ADMIN — LISINOPRIL 40 MG: 40 TABLET ORAL at 09:22

## 2023-05-22 RX ADMIN — METOPROLOL TARTRATE 25 MG: 25 TABLET, FILM COATED ORAL at 21:34

## 2023-05-22 RX ADMIN — OXYCODONE 10 MG: 5 TABLET ORAL at 00:08

## 2023-05-22 RX ADMIN — SODIUM CHLORIDE, PRESERVATIVE FREE 10 ML: 5 INJECTION INTRAVENOUS at 21:36

## 2023-05-22 RX ADMIN — OXYCODONE 10 MG: 5 TABLET ORAL at 09:29

## 2023-05-22 ASSESSMENT — PAIN DESCRIPTION - DESCRIPTORS
DESCRIPTORS: ACHING;DISCOMFORT;SPASM
DESCRIPTORS: ACHING
DESCRIPTORS: ACHING;DISCOMFORT
DESCRIPTORS: ACHING;DISCOMFORT;SPASM
DESCRIPTORS: ACHING;STABBING
DESCRIPTORS: STABBING;ACHING

## 2023-05-22 ASSESSMENT — PAIN - FUNCTIONAL ASSESSMENT
PAIN_FUNCTIONAL_ASSESSMENT: ACTIVITIES ARE NOT PREVENTED
PAIN_FUNCTIONAL_ASSESSMENT: ACTIVITIES ARE NOT PREVENTED

## 2023-05-22 ASSESSMENT — PAIN SCALES - GENERAL
PAINLEVEL_OUTOF10: 8
PAINLEVEL_OUTOF10: 8
PAINLEVEL_OUTOF10: 0
PAINLEVEL_OUTOF10: 8
PAINLEVEL_OUTOF10: 7
PAINLEVEL_OUTOF10: 2
PAINLEVEL_OUTOF10: 0
PAINLEVEL_OUTOF10: 6
PAINLEVEL_OUTOF10: 8
PAINLEVEL_OUTOF10: 8

## 2023-05-22 ASSESSMENT — PAIN DESCRIPTION - ONSET
ONSET: ON-GOING

## 2023-05-22 ASSESSMENT — PAIN DESCRIPTION - ORIENTATION
ORIENTATION: LOWER
ORIENTATION: RIGHT;LEFT
ORIENTATION: RIGHT;LEFT
ORIENTATION: LOWER

## 2023-05-22 ASSESSMENT — PAIN DESCRIPTION - FREQUENCY
FREQUENCY: CONTINUOUS

## 2023-05-22 ASSESSMENT — PAIN DESCRIPTION - LOCATION
LOCATION: BACK

## 2023-05-22 ASSESSMENT — PAIN DESCRIPTION - DIRECTION: RADIATING_TOWARDS: TESTICLES

## 2023-05-22 ASSESSMENT — PAIN DESCRIPTION - PAIN TYPE
TYPE: SURGICAL PAIN
TYPE: SURGICAL PAIN

## 2023-05-22 NOTE — CARE COORDINATION
5/22/23, 4:13 PM EDT    DISCHARGE ON GOING [facility] day: 0  Location: -18/018-A Reason for admit: Lumbosacral spinal stenosis [M48.07]  Lumbar stenosis with neurogenic claudication [M48.062]   Procedure: 5/19 : 1.  L2-S1 bilateral laminectomy, partial medial facetectomies and foraminotomies of L2, L3, L4, L5, and S1 nerve roots along with total facetetomies at L5-S1 on the right for complete decompression of the right L5 nerve roots  2. L3-S1 posterior spinal fusion. 3.  L3-S1 posterior spinal instrumentation, Cortera, SurgAlign instrumentation. 4. L5-S1 transforaminal lumbar interbody fusion, anterior posterior fusion using a single posterior approach  5.   10 x 29 x 9-mm FortiLink interbody cage  6. Use of local autograft bone and 24cc BioAdapt Bridge  Barriers to Discharge: back brace, needs move, continue PT/OT. Pain control. PCP: Christin Krishnan DO  Readmission Risk Score: 10.3%  Patient Goals/Plan/Treatment Preferences: pt has had [facility] list since Sat 5/20; visited patient and he had not looked at list since receiving it. Requested his 1st and 2nd preference. CM will need to visit with him Tuesday as he became angry with this CM. Lives With: Alone  Type of Home: House  Home Layout: One level, Laundry in basement  Home Access: Stairs to enter without rails  Entrance Stairs - Number of Steps: 4 platform steps per pt  Home Equipment: Shamika Pereyra, 4 wheeled, [place] Shower/Tub: Tub/Shower unit  [employer]: Standard    Post-acute [facility]) provider list was provided to patient on 5/20/23. Patient was informed of their freedom to choose [facility] provider. Discussed and offered to show the patient the relevant [facility] Providers quality and resource use measures on Medicare Compare web site via computer based on patient's goals of care and treatment preferences. Questions regarding selection process were answered.

## 2023-05-23 LAB
ANION GAP SERPL CALC-SCNC: 13 MEQ/L (ref 8–16)
BUN SERPL-MCNC: 11 MG/DL (ref 7–22)
CALCIUM SERPL-MCNC: 8.8 MG/DL (ref 8.5–10.5)
CHLORIDE SERPL-SCNC: 92 MEQ/L (ref 98–111)
CO2 SERPL-SCNC: 24 MEQ/L (ref 23–33)
CREAT SERPL-MCNC: 0.6 MG/DL (ref 0.4–1.2)
GFR SERPL CREATININE-BSD FRML MDRD: > 60 ML/MIN/1.73M2
GLUCOSE SERPL-MCNC: 146 MG/DL (ref 70–108)
HCT VFR BLD AUTO: 24.5 % (ref 42–52)
HGB BLD-MCNC: 8.3 GM/DL (ref 14–18)
POTASSIUM SERPL-SCNC: 4.2 MEQ/L (ref 3.5–5.2)
SODIUM SERPL-SCNC: 129 MEQ/L (ref 135–145)

## 2023-05-23 PROCEDURE — 2580000003 HC RX 258: Performed by: PHYSICIAN ASSISTANT

## 2023-05-23 PROCEDURE — 97535 SELF CARE MNGMENT TRAINING: CPT

## 2023-05-23 PROCEDURE — 1200000000 HC SEMI PRIVATE

## 2023-05-23 PROCEDURE — 85014 HEMATOCRIT: CPT

## 2023-05-23 PROCEDURE — 97116 GAIT TRAINING THERAPY: CPT

## 2023-05-23 PROCEDURE — 6370000000 HC RX 637 (ALT 250 FOR IP): Performed by: PHYSICIAN ASSISTANT

## 2023-05-23 PROCEDURE — 80048 BASIC METABOLIC PNL TOTAL CA: CPT

## 2023-05-23 PROCEDURE — 97530 THERAPEUTIC ACTIVITIES: CPT

## 2023-05-23 PROCEDURE — 36415 COLL VENOUS BLD VENIPUNCTURE: CPT

## 2023-05-23 PROCEDURE — 85018 HEMOGLOBIN: CPT

## 2023-05-23 RX ADMIN — POLYETHYLENE GLYCOL 3350 17 G: 17 POWDER, FOR SOLUTION ORAL at 08:39

## 2023-05-23 RX ADMIN — DOCUSATE SODIUM 50 MG AND SENNOSIDES 8.6 MG 1 TABLET: 8.6; 5 TABLET, FILM COATED ORAL at 20:05

## 2023-05-23 RX ADMIN — HYDROCHLOROTHIAZIDE 25 MG: 25 TABLET ORAL at 08:39

## 2023-05-23 RX ADMIN — ACETAMINOPHEN 650 MG: 325 TABLET ORAL at 19:45

## 2023-05-23 RX ADMIN — DOCUSATE SODIUM 50 MG AND SENNOSIDES 8.6 MG 1 TABLET: 8.6; 5 TABLET, FILM COATED ORAL at 08:39

## 2023-05-23 RX ADMIN — METOPROLOL TARTRATE 25 MG: 25 TABLET, FILM COATED ORAL at 08:39

## 2023-05-23 RX ADMIN — METOPROLOL TARTRATE 25 MG: 25 TABLET, FILM COATED ORAL at 20:05

## 2023-05-23 RX ADMIN — ATORVASTATIN CALCIUM 40 MG: 40 TABLET, FILM COATED ORAL at 20:05

## 2023-05-23 RX ADMIN — OXYCODONE 10 MG: 5 TABLET ORAL at 19:45

## 2023-05-23 RX ADMIN — ACETAMINOPHEN 650 MG: 325 TABLET ORAL at 04:27

## 2023-05-23 RX ADMIN — SODIUM CHLORIDE, PRESERVATIVE FREE 10 ML: 5 INJECTION INTRAVENOUS at 08:42

## 2023-05-23 RX ADMIN — OXYCODONE 10 MG: 5 TABLET ORAL at 08:39

## 2023-05-23 RX ADMIN — LISINOPRIL 40 MG: 40 TABLET ORAL at 08:39

## 2023-05-23 RX ADMIN — AMLODIPINE BESYLATE 10 MG: 10 TABLET ORAL at 08:40

## 2023-05-23 RX ADMIN — PANTOPRAZOLE SODIUM 40 MG: 40 TABLET, DELAYED RELEASE ORAL at 06:32

## 2023-05-23 RX ADMIN — BISACODYL 5 MG: 5 TABLET, COATED ORAL at 08:40

## 2023-05-23 ASSESSMENT — PAIN DESCRIPTION - DESCRIPTORS
DESCRIPTORS: SHARP;BURNING
DESCRIPTORS: ACHING
DESCRIPTORS: ACHING

## 2023-05-23 ASSESSMENT — PAIN DESCRIPTION - LOCATION
LOCATION: BACK

## 2023-05-23 ASSESSMENT — PAIN DESCRIPTION - FREQUENCY: FREQUENCY: INTERMITTENT

## 2023-05-23 ASSESSMENT — PAIN DESCRIPTION - ORIENTATION
ORIENTATION: MID
ORIENTATION: LOWER
ORIENTATION: MID

## 2023-05-23 ASSESSMENT — PAIN DESCRIPTION - ONSET: ONSET: ON-GOING

## 2023-05-23 ASSESSMENT — PAIN SCALES - GENERAL
PAINLEVEL_OUTOF10: 3
PAINLEVEL_OUTOF10: 7
PAINLEVEL_OUTOF10: 8
PAINLEVEL_OUTOF10: 7
PAINLEVEL_OUTOF10: 4

## 2023-05-23 ASSESSMENT — PAIN - FUNCTIONAL ASSESSMENT
PAIN_FUNCTIONAL_ASSESSMENT: ACTIVITIES ARE NOT PREVENTED

## 2023-05-23 ASSESSMENT — PAIN DESCRIPTION - DIRECTION: RADIATING_TOWARDS: TESTICLES

## 2023-05-23 ASSESSMENT — PAIN DESCRIPTION - PAIN TYPE: TYPE: SURGICAL PAIN

## 2023-05-23 NOTE — CARE COORDINATION
5/23/23, 10:37 AM EDT    DISCHARGE ON [address] day: 1  Location: -18/018-A Reason for admit: Lumbosacral spinal stenosis [M48.07]  Lumbar stenosis with neurogenic claudication [M48.062]   Procedure:   5/19 : 1.  L2-S1 bilateral laminectomy, partial medial facetectomies and foraminotomies of L2, L3, L4, L5, and S1 nerve roots along with total facetetomies at L5-S1 on the right for complete decompression of the right L5 nerve roots  2. L3-S1 posterior spinal fusion. 3.  L3-S1 posterior spinal instrumentation, Cortera, SurgAlign instrumentation. 4. L5-S1 transforaminal lumbar interbody fusion, anterior posterior fusion using a single posterior approach  5.   10 x 29 x 9-mm FortiLink interbody cage  6. Use of local autograft bone and 24cc BioAdapt Bridge  Barriers to Discharge: POD 4. Await BM. Drain care. Refuses back brace as he reports it will not be covered and plans to obtain self. PCP: Kris Khalil DO  Readmission Risk Score: 10.3%  Patient Goals/Plan/Treatment Preferences: Spoke with Ana Laura Preston to follow-up on [facility]. Reports he will not have coverage for [facility] services. [employer] insurance is listed but reports a large deductible and believes services will not be covered. Offered to check with [facility] to see if they cover his area and discussed hardship forms. He declines, reports hardship forms have not helped in past. Ask cost of visits, explained that each agency is different, offered to make referrals. Explained that agency can check benefits and notify him of cost prior to visit. Declines services at this time. Message sent to ANURADHA Chou to update on declining [facility]. They will follow drain output and need for BM today. Primary RN updated. Possible discharge if drains removed and has bm  5/23/23, 11:45 AM EDT    Patient goals/plan/ treatment preferences discussed by  and .   Patient goals/plan/ treatment preferences reviewed with

## 2023-05-24 VITALS
OXYGEN SATURATION: 94 % | RESPIRATION RATE: 17 BRPM | BODY MASS INDEX: 25.57 KG/M2 | TEMPERATURE: 97.8 F | WEIGHT: 178.6 LBS | HEART RATE: 70 BPM | SYSTOLIC BLOOD PRESSURE: 136 MMHG | DIASTOLIC BLOOD PRESSURE: 49 MMHG | HEIGHT: 70 IN

## 2023-05-24 LAB
ANION GAP SERPL CALC-SCNC: 13 MEQ/L (ref 8–16)
BUN SERPL-MCNC: 11 MG/DL (ref 7–22)
CALCIUM SERPL-MCNC: 8.7 MG/DL (ref 8.5–10.5)
CHLORIDE SERPL-SCNC: 89 MEQ/L (ref 98–111)
CO2 SERPL-SCNC: 26 MEQ/L (ref 23–33)
CREAT SERPL-MCNC: 0.8 MG/DL (ref 0.4–1.2)
GFR SERPL CREATININE-BSD FRML MDRD: > 60 ML/MIN/1.73M2
GLUCOSE SERPL-MCNC: 174 MG/DL (ref 70–108)
HCT VFR BLD AUTO: 25 % (ref 42–52)
HGB BLD-MCNC: 8.4 GM/DL (ref 14–18)
POTASSIUM SERPL-SCNC: 3.7 MEQ/L (ref 3.5–5.2)
SODIUM SERPL-SCNC: 128 MEQ/L (ref 135–145)

## 2023-05-24 PROCEDURE — 85018 HEMOGLOBIN: CPT

## 2023-05-24 PROCEDURE — 6370000000 HC RX 637 (ALT 250 FOR IP): Performed by: PHYSICIAN ASSISTANT

## 2023-05-24 PROCEDURE — 85014 HEMATOCRIT: CPT

## 2023-05-24 PROCEDURE — 36415 COLL VENOUS BLD VENIPUNCTURE: CPT

## 2023-05-24 PROCEDURE — 97530 THERAPEUTIC ACTIVITIES: CPT

## 2023-05-24 PROCEDURE — 80048 BASIC METABOLIC PNL TOTAL CA: CPT

## 2023-05-24 RX ADMIN — METOPROLOL TARTRATE 25 MG: 25 TABLET, FILM COATED ORAL at 10:57

## 2023-05-24 RX ADMIN — ACETAMINOPHEN 650 MG: 325 TABLET ORAL at 04:12

## 2023-05-24 RX ADMIN — HYDROCHLOROTHIAZIDE 25 MG: 25 TABLET ORAL at 10:57

## 2023-05-24 RX ADMIN — BISACODYL 5 MG: 5 TABLET, COATED ORAL at 10:56

## 2023-05-24 RX ADMIN — OXYCODONE 10 MG: 5 TABLET ORAL at 04:12

## 2023-05-24 RX ADMIN — DOCUSATE SODIUM 50 MG AND SENNOSIDES 8.6 MG 1 TABLET: 8.6; 5 TABLET, FILM COATED ORAL at 10:56

## 2023-05-24 RX ADMIN — PANTOPRAZOLE SODIUM 40 MG: 40 TABLET, DELAYED RELEASE ORAL at 05:48

## 2023-05-24 RX ADMIN — CYCLOBENZAPRINE 10 MG: 10 TABLET, FILM COATED ORAL at 00:37

## 2023-05-24 RX ADMIN — POLYETHYLENE GLYCOL 3350 17 G: 17 POWDER, FOR SOLUTION ORAL at 10:55

## 2023-05-24 RX ADMIN — LISINOPRIL 40 MG: 40 TABLET ORAL at 10:57

## 2023-05-24 RX ADMIN — AMLODIPINE BESYLATE 10 MG: 10 TABLET ORAL at 10:56

## 2023-05-24 ASSESSMENT — PAIN SCALES - GENERAL
PAINLEVEL_OUTOF10: 6
PAINLEVEL_OUTOF10: 6
PAINLEVEL_OUTOF10: 7

## 2023-05-24 ASSESSMENT — PAIN DESCRIPTION - DESCRIPTORS: DESCRIPTORS: ACHING

## 2023-05-24 ASSESSMENT — PAIN - FUNCTIONAL ASSESSMENT: PAIN_FUNCTIONAL_ASSESSMENT: ACTIVITIES ARE NOT PREVENTED

## 2023-05-24 ASSESSMENT — PAIN DESCRIPTION - LOCATION
LOCATION: BACK

## 2023-05-24 ASSESSMENT — PAIN DESCRIPTION - ORIENTATION
ORIENTATION: LOWER
ORIENTATION: LOWER

## 2023-05-24 ASSESSMENT — PAIN DESCRIPTION - FREQUENCY: FREQUENCY: INTERMITTENT

## 2023-05-24 ASSESSMENT — PAIN DESCRIPTION - ONSET: ONSET: ON-GOING

## 2023-05-24 ASSESSMENT — PAIN DESCRIPTION - PAIN TYPE: TYPE: SURGICAL PAIN

## 2023-05-24 NOTE — PLAN OF CARE
Problem: Chronic Conditions and Co-morbidities  Goal: Patient's chronic conditions and co-morbidity symptoms are monitored and maintained or improved  Outcome: Progressing  Flowsheets (Taken 5/21/2023 1930)  Care Plan - Patient's Chronic Conditions and Co-Morbidity Symptoms are Monitored and Maintained or Improved: Monitor and assess patient's chronic conditions and comorbid symptoms for stability, deterioration, or improvement     Problem: Pain  Goal: Verbalizes/displays adequate comfort level or baseline comfort level  Outcome: Progressing     Problem: ABCDS Injury Assessment  Goal: Absence of physical injury  Outcome: Progressing     Problem: Skin/Tissue Integrity - Adult  Goal: Skin integrity remains intact  Outcome: Progressing  Flowsheets  Taken 5/21/2023 2217  Skin Integrity Remains Intact: Monitor for areas of redness and/or skin breakdown    Problem: Skin/Tissue Integrity - Adult  Goal: Incisions, wounds, or drain sites healing without S/S of infection  Outcome: Progressing  Flowsheets  Taken 5/21/2023 2217  Incisions, Wounds, or Drain Sites Healing Without Sign and Symptoms of Infection: TWICE DAILY: Assess and document skin integrity    Problem: Musculoskeletal - Adult  Goal: Return mobility to safest level of function  Outcome: Progressing  Flowsheets (Taken 5/21/2023 1930)  Return Mobility to Safest Level of Function: Assess patient stability and activity tolerance for standing, transferring and ambulating with or without assistive devices
Problem: Discharge Planning  Goal: Discharge to home or other facility with appropriate resources  5/24/2023 0052 by Gm Kimble RN  Outcome: Progressing  Flowsheets (Taken 5/24/2023 0052)  Discharge to home or other facility with appropriate resources:   Identify barriers to discharge with patient and caregiver   Arrange for needed discharge resources and transportation as appropriate   Identify discharge learning needs (meds, wound care, etc)     Problem: Chronic Conditions and Co-morbidities  Goal: Patient's chronic conditions and co-morbidity symptoms are monitored and maintained or improved  5/24/2023 0052 by Gm Kimble RN  Outcome: Progressing  Flowsheets (Taken 5/24/2023 0052)  Care Plan - Patient's Chronic Conditions and Co-Morbidity Symptoms are Monitored and Maintained or Improved:   Monitor and assess patient's chronic conditions and comorbid symptoms for stability, deterioration, or improvement   Collaborate with multidisciplinary team to address chronic and comorbid conditions and prevent exacerbation or deterioration     Problem: Pain  Goal: Verbalizes/displays adequate comfort level or baseline comfort level  5/24/2023 0052 by Gm Kimble RN  Outcome: Progressing  Flowsheets (Taken 5/24/2023 0052)  Verbalizes/displays adequate comfort level or baseline comfort level:   Encourage patient to monitor pain and request assistance   Assess pain using appropriate pain scale   Administer analgesics based on type and severity of pain and evaluate response   Implement non-pharmacological measures as appropriate and evaluate response     Problem: Safety - Adult  Goal: Free from fall injury  5/24/2023 0052 by Gm Kimble RN  Outcome: Progressing  Flowsheets (Taken 5/24/2023 0052)  Free From Fall Injury: Instruct family/caregiver on patient safety     Problem: ABCDS Injury Assessment  Goal: Absence of physical injury  5/24/2023 0052 by Gm Kimble RN  Outcome: Progressing  Flowsheets (Taken
Problem: Discharge Planning  Goal: Discharge to home or other facility with appropriate resources  5/24/2023 1601 by Anat Kennedy RN  Outcome: Completed     Problem: Chronic Conditions and Co-morbidities  Goal: Patient's chronic conditions and co-morbidity symptoms are monitored and maintained or improved  5/24/2023 1601 by Anat Kennedy RN  Outcome: Completed     Problem: Pain  Goal: Verbalizes/displays adequate comfort level or baseline comfort level  5/24/2023 1601 by Anat Kennedy RN  Outcome: Completed     Problem: Safety - Adult  Goal: Free from fall injury  5/24/2023 1601 by Anat Kennedy RN  Outcome: Completed     Problem: ABCDS Injury Assessment  Goal: Absence of physical injury  5/24/2023 1601 by Anat Kennedy RN  Outcome: Completed     Problem: Skin/Tissue Integrity - Adult  Goal: Incisions, wounds, or drain sites healing without S/S of infection  5/24/2023 1601 by Anat Kennedy RN  Outcome: Completed     Problem: Musculoskeletal - Adult  Goal: Return mobility to safest level of function  5/24/2023 1601 by Anat Kennedy RN  Outcome: Completed   Care plan reviewed with patient. Patient verbalize understanding of the plan of care and contribute to goal setting.
Problem: Discharge Planning  Goal: Discharge to home or other facility with appropriate resources  Outcome: Adequate for Discharge     Problem: Chronic Conditions and Co-morbidities  Goal: Patient's chronic conditions and co-morbidity symptoms are monitored and maintained or improved  Outcome: Adequate for Discharge     Problem: Pain  Goal: Verbalizes/displays adequate comfort level or baseline comfort level  Outcome: Adequate for Discharge     Problem: Safety - Adult  Goal: Free from fall injury  Outcome: Adequate for Discharge     Problem: ABCDS Injury Assessment  Goal: Absence of physical injury  Outcome: Adequate for Discharge     Problem: Skin/Tissue Integrity - Adult  Goal: Incisions, wounds, or drain sites healing without S/S of infection  Outcome: Adequate for Discharge     Problem: Musculoskeletal - Adult  Goal: Return mobility to safest level of function  Outcome: Adequate for Discharge   Care plan reviewed with patient. Patient verbalized understanding of the plan of care and contribute to goal setting.
Problem: ABCDS Injury Assessment  Goal: Absence of physical injury  Outcome: Progressing  Flowsheets (Taken 5/23/2023 1354)  Absence of Physical Injury: Implement safety measures based on patient assessment     Problem: Skin/Tissue Integrity - Adult  Goal: Incisions, wounds, or drain sites healing without S/S of infection  Outcome: Progressing  Flowsheets (Taken 5/23/2023 1356)  Incisions, Wounds, or Drain Sites Healing Without Sign and Symptoms of Infection:   TWICE DAILY: Assess and document skin integrity   TWICE DAILY: Assess and document dressing/incision, wound bed, drain sites and surrounding tissue     Problem: Musculoskeletal - Adult  Goal: Return mobility to safest level of function  Outcome: Progressing  Flowsheets (Taken 5/23/2023 1356)  Return Mobility to Safest Level of Function:   Assess patient stability and activity tolerance for standing, transferring and ambulating with or without assistive devices   Assist with transfers and ambulation using safe patient handling equipment as needed   Ensure adequate protection for wounds/incisions during mobilization   Obtain physical therapy/occupational therapy consults as needed   Care plan reviewed with patient. Patient verbalize understanding of the plan of care and contribute to goal setting.

## 2023-05-24 NOTE — PROGRESS NOTES
[facility]  Occupational Therapy  Daily Note  Time:   Time In: 1040  Time Out: 1109  Timed Code Treatment Minutes: 29 Minutes  Minutes: 29          Date: 2023  Patient Name: Crystal Gonsales,   Gender: male      Room: [facility]18/018-A  MRN: [id number]  : 1970  (52 y.o.)  Referring Practitioner: ANURADHA Vick  Diagnosis: Lumbar stenosis with neurogenic claudication  Additional Pertinent Hx: admit with above diagnosis, s/p1. L2-S1 bilateral laminectomy, partial medial facetectomies and foraminotomies of L2, L3, L4, L5, and S1 nerve roots along with total facetetomies at L5-S1 on the right for complete decompression of the right L5 nerve roots  2. L3-S1 posterior spinal fusion. 3.  L3-S1 posterior spinal instrumentation, Cortera, SurgAlign instrumentation. 4. L5-S1 transforaminal lumbar interbody fusion, anterior posterior fusion using a single posterior approach  5.   10 x 29 x 9-mm FortiLink interbody cage  6. Use of local autograft bone and 24cc BioAdapt Bridge on 23. Restrictions/Precautions:  Restrictions/Precautions: Fall Risk  Required Braces or Orthoses  Spinal: Lumbar Corset  Position Activity Restriction  Spinal Precautions: No Bending, No Lifting, No Twisting     SUBJECTIVE: RN okayed OT session. Pt. In room supine in bed agreeable to participate. PAIN: reports some discomfort when standing increased with movement did not rate    Vitals: Vitals not assessed per clinical judgement, see nursing flowsheet    COGNITION: Decreased Insight, Decreased Problem Solving, and Decreased Safety Awareness    ADL:   Grooming: Contact Guard Assistance. To stand at sink and complete for 3 mins  Toileting: Contact Guard Assistance. Toilet Transfer: Contact Guard Assistance. Allena Closs BALANCE:  Sitting Balance:  Stand By Assistance. Standing Balance: Contact Guard Assistance.       BED MOBILITY:  Supine to Sit: Stand By Assistance, with rail, with increased
Department of Orthopedic Surgery  Spine Service  Attending Progress Note        Subjective:  POD#3 no new issues, patient feeling well this morning. Denies leg pain or N/T. No BM. Ambulating well to the bathroom. No issues with voiding. Vitals  VITALS:  BP (!) 163/75   Pulse 88   Temp 98.1 °F (36.7 °C) (Oral)   Resp 18   Ht 5' 10\" (1.778 m)   Wt 178 lb 9.6 oz (81 kg)   SpO2 96%   BMI 25.63 kg/m²   24HR INTAKE/OUTPUT:    Intake/Output Summary (Last 24 hours) at 5/22/2023 8841  Last data filed at 5/22/2023 0510  Gross per 24 hour   Intake --   Output 1650 ml   Net -1650 ml       URINARY CATHETER OUTPUT (Rider):     DRAIN/TUBE OUTPUT:  Closed/Suction Drain Left;Medial Back Accordion-Output (ml): 50 ml  Closed/Suction Drain Right;Medial Back Accordion-Output (ml): 80 ml      PHYSICAL EXAM:    Orientation:  alert and oriented to person, place and time    Incision:  dressing in place, clean, dry, intact    Lower Extremity Motor :  quadriceps, extensor hallucis longus, dorsiflexion, plantarflexion 5/5 bilaterally  Lower Extremity Sensory:  Intact L1-S1    Flatus:  positive      LABS:    HgB:    Lab Results   Component Value Date/Time    HGB 7.9 05/22/2023 05:38 AM     Hemoglobin/Hematocrit:    Lab Results   Component Value Date/Time    HGB 7.9 05/22/2023 05:38 AM    HCT 23.6 05/22/2023 05:38 AM     BMP:    Lab Results   Component Value Date/Time     05/22/2023 05:38 AM    K 3.9 05/22/2023 05:38 AM    K 5.2 07/07/2021 05:36 AM    CL 96 05/22/2023 05:38 AM    CO2 23 05/22/2023 05:38 AM    BUN 10 05/22/2023 05:38 AM    LABALBU 4.8 05/02/2023 01:45 PM    CREATININE 0.7 05/22/2023 05:38 AM    CALCIUM 8.7 05/22/2023 05:38 AM    LABGLOM >60 05/22/2023 05:38 AM    LABGLOM >60 02/10/2023 12:00 AM    GLUCOSE 132 05/22/2023 05:38 AM       ASSESSMENT AND PLAN:    Post operative day 3 status post L2-S1 decompression and fusion    1:  Monitor labs and drain output  2:  Activity Level:  As tolerated. PT/OT.  Back brace
Department of Orthopedic Surgery  Spine Service  Attending Progress Note        Subjective:  POD#4 no new issues, patient sleeping when I entered the room. Denies leg pain or N/T. No BM. Ambulating well to the bathroom. Vitals  VITALS:  BP (!) 113/54   Pulse 87   Temp 97.7 °F (36.5 °C) (Oral)   Resp 18   Ht 5' 10\" (1.778 m)   Wt 178 lb 9.6 oz (81 kg)   SpO2 100%   BMI 25.63 kg/m²   24HR INTAKE/OUTPUT:    Intake/Output Summary (Last 24 hours) at 5/23/2023 0950  Last data filed at 5/23/2023 0436  Gross per 24 hour   Intake 360 ml   Output 300 ml   Net 60 ml       URINARY CATHETER OUTPUT (Rider):     DRAIN/TUBE OUTPUT:  Closed/Suction Drain Left;Medial Back Accordion-Output (ml): 50 ml  Closed/Suction Drain Right;Medial Back Accordion-Output (ml): 40 ml      PHYSICAL EXAM:    Orientation:  alert and oriented to person, place and time    Incision:  dressing in place, clean, dry, intact    Lower Extremity Motor :  quadriceps, extensor hallucis longus, dorsiflexion, plantarflexion 5/5 bilaterally  Lower Extremity Sensory:  Intact L1-S1    Flatus:  positive      LABS:    HgB:    Lab Results   Component Value Date/Time    HGB 8.3 05/23/2023 06:19 AM     Hemoglobin/Hematocrit:    Lab Results   Component Value Date/Time    HGB 8.3 05/23/2023 06:19 AM    HCT 24.5 05/23/2023 06:19 AM     BMP:    Lab Results   Component Value Date/Time     05/23/2023 06:19 AM    K 4.2 05/23/2023 06:19 AM    K 5.2 07/07/2021 05:36 AM    CL 92 05/23/2023 06:19 AM    CO2 24 05/23/2023 06:19 AM    BUN 11 05/23/2023 06:19 AM    LABALBU 4.8 05/02/2023 01:45 PM    CREATININE 0.6 05/23/2023 06:19 AM    CALCIUM 8.8 05/23/2023 06:19 AM    LABGLOM >60 05/23/2023 06:19 AM    LABGLOM >60 02/10/2023 12:00 AM    GLUCOSE 146 05/23/2023 06:19 AM       ASSESSMENT AND PLAN:    Post operative day 4 status post L2-S1 decompression and fusion    1:  Monitor labs and drain output  2:  Activity Level:  As tolerated. PT/OT.  Back brace ordered  3:  Pain
Department of Orthopedic Surgery  Spine Service  Attending Progress Note        Subjective:  POD#5 patient doing well, no radicular symptoms. Ambulated in room and halls, did steps yesterday. +BM     Vitals  VITALS:  /67   Pulse 73   Temp 98.3 °F (36.8 °C) (Oral)   Resp 17   Ht 5' 10\" (1.778 m)   Wt 178 lb 9.6 oz (81 kg)   SpO2 96%   BMI 25.63 kg/m²   24HR INTAKE/OUTPUT:    Intake/Output Summary (Last 24 hours) at 5/24/2023 0617  Last data filed at 5/24/2023 0411  Gross per 24 hour   Intake 580 ml   Output 170 ml   Net 410 ml       URINARY CATHETER OUTPUT (Rider):     DRAIN/TUBE OUTPUT:  Closed/Suction Drain Left;Medial Back Accordion-Output (ml): 10 ml  Closed/Suction Drain Right;Medial Back Accordion-Output (ml): 20 ml      PHYSICAL EXAM:    Orientation:  alert and oriented to person, place and time    Incision:  dressing in place, clean, dry, intact - slight saturation    Lower Extremity Motor :  quadriceps, extensor hallucis longus, dorsiflexion, plantarflexion 5/5 bilaterally  Lower Extremity Sensory:  Intact L1-S1    Flatus:  positive      LABS:    HgB:    Lab Results   Component Value Date/Time    HGB 8.3 05/23/2023 06:19 AM     Hemoglobin/Hematocrit:    Lab Results   Component Value Date/Time    HGB 8.3 05/23/2023 06:19 AM    HCT 24.5 05/23/2023 06:19 AM     BMP:    Lab Results   Component Value Date/Time     05/23/2023 06:19 AM    K 4.2 05/23/2023 06:19 AM    K 5.2 07/07/2021 05:36 AM    CL 92 05/23/2023 06:19 AM    CO2 24 05/23/2023 06:19 AM    BUN 11 05/23/2023 06:19 AM    LABALBU 4.8 05/02/2023 01:45 PM    CREATININE 0.6 05/23/2023 06:19 AM    CALCIUM 8.8 05/23/2023 06:19 AM    LABGLOM >60 05/23/2023 06:19 AM    LABGLOM >60 02/10/2023 12:00 AM    GLUCOSE 146 05/23/2023 06:19 AM       ASSESSMENT AND PLAN:    Post operative day 5 status post L2-S1 decompression and fusion    1:  Monitor labs and drain output - clotting removed from both hemovac drains  2:  Activity Level:  As tolerated.
Discharge instruction given, no voiced concerns, transported via wheelchair to private car.
[facility] [address]  OCCUPATIONAL THERAPY MISSED TREATMENT NOTE  [facility] ORTHOPEDICS 7K  7K-18/018-A      Date: 2023  Patient Name: Malena Hernandez        CSN: [id number]   : 1970  (52 y.o.)  Gender: male   Referring Practitioner: ANURADHA Eisenberg  Diagnosis: Lumbar stenosis with neurogenic claudication         REASON FOR MISSED TREATMENT: OT treatment attempted pt. became agitated when discussing  sound on end of bed declined therapy requesting for therapist to leave room.
[facility] [address]  PHYSICAL THERAPY MISSED TREATMENT NOTE  [facility] ORTHOPEDICS 7K    Date: 2023  Patient Name: Chivo Chan        MRN: [id number]   : 1970  (52 y.o.)  Gender: male   Referring Practitioner: ANURADHA Wooten  Diagnosis: lumbosacral spinal stenosis         REASON FOR MISSED TREATMENT:  Missed Treat. Per discussion with OT, pt had requested to nap and had declined furhter tasks during OT session.  Will try back later if able or per POC
[facility] [address]  PHYSICAL THERAPY MISSED TREATMENT NOTE  [facility] ORTHOPEDICS 7K    Date: 2023  Patient Name: Juany Villar        MRN: [id number]   : 1970  (52 y.o.)  Gender: male   Referring Practitioner: ANURADHA Gurrola  Diagnosis: lumbosacral spinal stenosis         REASON FOR MISSED TREATMENT:  Missed Treat. Pt not seen due to ancillary discipline OT seeing pt at this time. Pt requested PT come back later due to pt stating he didn't think he could participate with PT right after OT session. Will attempt to see pt later if time allows and pt is agreeable.
Assistance    Scooting: Contact Guard Assistance      TRANSFERS:  Sit to Stand:  [employer], with increased time for completion. Stand to Sit: Contact Guard Assistance. FUNCTIONAL MOBILITY:  Assistive Device: 4 Wheeled Walker  Assist Level:  Contact Guard Assistance. Distance: To and from bathroom and in hallway  Slow pace, No LOB. ASSESSMENT:   Activity Tolerance:  Patient tolerance of  treatment: Good treatment tolerance      Discharge Recommendations: Continue to assess pending progress and Home with Home Health OT  Equipment Recommendations: Other: monitor need for 2WW  Plan: Times Per Week: 5x  Current Treatment Recommendations: Strengthening, Balance training, Functional mobility training, Endurance training, Patient/Caregiver education & training, Safety education & training, Self-Care / ADL, Home management training    Patient Education  Patient Education: Role of OT, Plan of Care, ADL's, Precautions, Equipment Education, Importance of Increasing Activity, and Assistive Device Safety    Goals  Short Term Goals  Time Frame for Short Term Goals: by discharge  Short Term Goal 1: Pt will safely navigate to/from bathroom with CGA and no LOB or safety cues to increase indep with ADLs  Short Term Goal 2: Pt will tolerate dynamic standing x5-6min with CGA and 1-2 UE release for increased indep with grooming  Short Term Goal 3: Pt will complete LB ADLs with LHAE prn and min VC for maintaining precautions to increase indep with dressing    Following session, patient left in safe position with all fall risk precautions in place.
Mobility Raw Score : 17  AM-PAC Inpatient T-Scale Score : 42.13    ASSESSMENT:  Assessment: Patient progressing toward established goals. Activity Tolerance:  Patient tolerance of  treatment: poor. Pt required cues to slow down, increase awareness to B drains and for spinal precautions along with CGA to complete mobility tasks. He had increased pain in B LE upon stance and concluded session due to this despite encouragement. Pt also became agitated when I stated I cannot promise the ability to return later today. RN aware. This PT communicated continued concern to the RN regarding the pt's ability to return home alone safely with limited support, and stairs for home entry. Pt requires increased assist, and continued PT to progress his safe mobility.    Equipment Recommendations:Equipment Needed: No  Discharge Recommendations: Continue to assess pending progress, 24 hour assistance or supervision, Patient would benefit from continued PT at discharge, and Inpatient Therapy Stay  Plan: Current Treatment Recommendations: Strengthening, Balance training, Endurance training, Functional mobility training, Transfer training, Gait training, Stair training, Pain management, Safety education & training, Therapeutic activities  General Plan:  (6X O)    Patient Education  Patient Education: Plan of Care, Precautions/Restrictions, Bed Mobility, Equipment Education, Transfers, Reviewed Prior Education, Activity Pacing    Goals:  Patient Goals : to have a schedule  Short Term Goals  Time Frame for Short Term Goals: by discharge  Short Term Goal 1: bed mobility with MOD I to get in/out of bed  Short Term Goal 2: transfer with MOD I to get in/out of chairs  Short Term Goal 3: amb >75'x1 with RW and MOD I to walk safely in home  Short Term Goal 4: negotiate 4steps with no HR MOD I to enter home safely  Short Term Goal 5: negotiate flight of steps with HR and MOD I to get to laundry in basement  Long Term Goals  Time Frame for Long
Recommendations: Continue to assess pending progress, 24 hour assistance or supervision, and Patient would benefit from continued PT at discharge  Plan: Current Treatment Recommendations: Strengthening, Balance training, Endurance training, Functional mobility training, Transfer training, Gait training, Stair training, Pain management, Safety education & training, Therapeutic activities  General Plan:  (6X O)    Patient Education  Patient Education: Plan of Care, Bed Mobility, Transfers, Reviewed Prior Education, Gait, Activity Pacing    Goals:  Patient Goals : to have a schedule  Short Term Goals  Time Frame for Short Term Goals: by discharge  Short Term Goal 1: bed mobility with MOD I to get in/out of bed  Short Term Goal 2: transfer with MOD I to get in/out of chairs  Short Term Goal 3: amb >75'x1 with RW and MOD I to walk safely in home  Short Term Goal 4: negotiate 4steps with no HR MOD I to enter home safely  Short Term Goal 5: negotiate flight of steps with HR and MOD I to get to laundry in basement  Long Term Goals  Time Frame for Long Term Goals : no LTGs set secondary to short ELOS    Following session, patient left in safe position with all fall risk precautions in place.
amb >75'x1 with RW and MOD I to walk safely in home  Short Term Goal 4: negotiate platform step with walker and MOD I to enter home safely  Short Term Goal 5: negotiate flight of steps with HR and MOD I to get to laundry in basement  Long Term Goals  Time Frame for Long Term Goals : no LTGs set secondary to short ELOS    Following session, patient left in safe position with all fall risk precautions in place.

## 2023-05-24 NOTE — CARE COORDINATION
5/24/23, 2:45 PM EDT      Patient plans home; he declined [facility] for drain mgmt due to cost.  To follow up with [facility] juvencio [facility]   Refused back brace; he plans to purchase one on WIB. Patient goals/plan/ treatment preferences discussed by  and . Patient goals/plan/ treatment preferences reviewed with patient/ family. Patient/ family verbalize understanding of discharge plan and are in agreement with goal/plan/treatment preferences. Understanding was demonstrated using the teach back method. AVS provided by RN at time of discharge, which includes all necessary medical information pertaining to the patients current course of illness, treatment, post-discharge goals of care, and treatment preferences.      Services At/After Discharge: None

## 2023-05-24 NOTE — DISCHARGE INSTRUCTIONS
Back Surgery    Activity    No lifting, pushing, or pulling    Up as tolerated at least 3-4 times per day. Up walking-helps to decrease the risk of blood clots    Wear luis a hose as directed per your physician     No driving until cleared by your physician    Back Brace or abdominal binder    If your physician prescribes a brace/abdominal binder, wear back brace at all times. May remove when in bed or bathing    Follow all back precautions. Incision Care    Keep back incision dry and intact. Apply clean dry dressing once a day. May shower  if  no drainage from your incision-unless otherwise directed per your physician    No tub baths-no soaking of incision-no swimming     No heaving lifting of more than 5 lbs/or as directed per your physician       Diet    Increase Fluid/Water intake, eat foods high in fiber; fruits and vegetables to help to prevent constipation    Examples of foods high in fiber    Vegetables      All vegetables, especially asparagus, bean sprouts, broccoli, [place]  sprouts, cabbage, carrots, cauliflower, celery, corn, greens, green beans,  green  pepper, onions, peas, potatoes (with skin), snow peas, spinach,  squash,  sweet potatoes, tomatoes, zucchini      For maximum fiber intake, eat the peels of fruits and vegetables just be sure  to wash them well first.     Fruits    All fruits, especially apples, berries, grapefruits, mangoes, nectarines,  oranges, peaches, pears, dried fruits (figs, dates, prunes, raisins)      Choose raw fruits and vegetables over juice, cooked, or canned raw fruit  has  more fiber. Dried fruit is also a good source of fiber.        Some of the common symptoms of a surgical site infection are:    Symptoms in the area where the surgery took place:     Redness   Drainage   Pus   Pain   Swelling   Bad smell     Prevention of surgical site infection:    Make sure that your healthcare providers clean their hands before examining you - either with soap and water

## 2023-05-25 ENCOUNTER — TELEPHONE (OUTPATIENT)
Dept: FAMILY MEDICINE CLINIC | Age: 53
End: 2023-05-25

## 2023-05-25 NOTE — TELEPHONE ENCOUNTER
Care Transitions Initial Follow Up Call    Outreach made within 2 business days of discharge: Yes    Patient: Chivo Chan Patient : 1970   MRN: [id number]  Reason for Admission: L5-S1 Fusion. 9/10 pain. Taking Oxycodone    Discharge Date: 23       Spoke with: Patient    Discharge department/facility: [facility] Interactive Patient Contact:  Was patient able to fill all prescriptions: Yes  Was patient instructed to bring all medications to the follow-up visit: Yes  Is patient taking all medications as directed in the discharge summary?  Yes  Does patient understand their discharge instructions: Yes  Does patient have questions or concerns that need addressed prior to 7-14 day follow up office visit: no    Scheduled appointment with PCP within 7-14 days    Follow Up  Future Appointments   Date Time Provider Radha Becerra   6/15/2023 11:20 AM [facility]       Ladean Canavan, LPN

## 2023-05-25 NOTE — TELEPHONE ENCOUNTER
Patient was admitted to [facility] on 05/19/2023 for Lumbar Stenosis Surgery. Patient was discharged from [facility] on 05/24/2023. Patient will need a follow up discharge call.

## 2023-06-07 ENCOUNTER — OFFICE VISIT (OUTPATIENT)
Dept: FAMILY MEDICINE CLINIC | Age: 53
End: 2023-06-07
Payer: COMMERCIAL

## 2023-06-07 VITALS
RESPIRATION RATE: 20 BRPM | BODY MASS INDEX: 23.68 KG/M2 | OXYGEN SATURATION: 100 % | WEIGHT: 165 LBS | HEART RATE: 94 BPM | DIASTOLIC BLOOD PRESSURE: 70 MMHG | SYSTOLIC BLOOD PRESSURE: 132 MMHG | TEMPERATURE: 97 F

## 2023-06-07 DIAGNOSIS — M48.062 LUMBAR STENOSIS WITH NEUROGENIC CLAUDICATION: Primary | ICD-10-CM

## 2023-06-07 DIAGNOSIS — Z98.1 S/P LUMBAR FUSION: ICD-10-CM

## 2023-06-07 PROCEDURE — 3078F DIAST BP <80 MM HG: CPT | Performed by: STUDENT IN AN ORGANIZED HEALTH CARE EDUCATION/TRAINING PROGRAM

## 2023-06-07 PROCEDURE — 99213 OFFICE O/P EST LOW 20 MIN: CPT | Performed by: STUDENT IN AN ORGANIZED HEALTH CARE EDUCATION/TRAINING PROGRAM

## 2023-06-07 PROCEDURE — 3075F SYST BP GE 130 - 139MM HG: CPT | Performed by: STUDENT IN AN ORGANIZED HEALTH CARE EDUCATION/TRAINING PROGRAM

## 2023-06-07 RX ORDER — CYCLOBENZAPRINE HCL 10 MG
TABLET ORAL
COMMUNITY
Start: 2023-05-18

## 2023-06-07 RX ORDER — OXYCODONE HYDROCHLORIDE AND ACETAMINOPHEN 5; 325 MG/1; MG/1
TABLET ORAL
COMMUNITY
Start: 2023-05-18

## 2023-06-07 ASSESSMENT — ENCOUNTER SYMPTOMS
NAUSEA: 0
ABDOMINAL PAIN: 0
VOMITING: 0
COUGH: 0
SHORTNESS OF BREATH: 0
DIARRHEA: 0
CONSTIPATION: 0
BACK PAIN: 1

## 2023-06-07 NOTE — PROGRESS NOTES
Ale Medrano (:  1970) is a 52 y.o. male,Established patient, here for evaluation of the following chief complaint(s):  Follow-Up from Hospital (23), Health Maintenance (Vaccines /Diabetic foot exam /Diabetic retinal exam /Cologuard /CT lung screening /Lipids ), and Medication Refill (Norco )         ASSESSMENT/PLAN:  1. Lumbar stenosis with neurogenic claudication  2. S/P lumbar fusion  57-year-old male presents to office for postoperative follow-up from recent L2-S1 decompression, L2-S1 posterior fusion, L5-S1 TLIF with Dr. Radha Braxton due to lumbar stenosis with claudication. Patient is currently healing well. Still having some residual postoperative pain where he should notify his surgical team about this. Educated on using Flexeril to assist with muscle spasm/pain. Patient also educated on not overusing binder to prevent muscle weakness over time. No acute concerns continue to follow-up with surgical team as scheduled for postoperative follow-up. Return in about 6 weeks (around 2023) for chronic condition review. Subjective   SUBJECTIVE/OBJECTIVE:  57-year-old male presents office for postoperative follow-up. Patient states overall surgery went pretty well. He is still having some pain in his back more than what he was expecting. Patient been following up with orthopedic surgical team and has been told that things are going well. Patient is still using his low back binder to assist with stability of his back. Has been using it most of the day although he does take it off at times. He also has been using Percocet for pain control and to help him sleep. He has not yet tried his muscle relaxer because he states the Percocet was working better and he was not really experiencing many muscle spasms. Patient just wanted to check in to make sure he was okay and there is nothing further he need to do for postoperative care.   He still has follow-up appointment scheduled with

## 2023-06-21 RX ORDER — CYCLOBENZAPRINE HCL 10 MG
10 TABLET ORAL 2 TIMES DAILY PRN
Qty: 60 TABLET | Refills: 0 | Status: SHIPPED | OUTPATIENT
Start: 2023-06-21 | End: 2023-07-21

## 2023-06-21 NOTE — TELEPHONE ENCOUNTER
Pt states he ran out of his Percocet and is requesting some other kind of pain medication to help him with his back pain- since he is out of the percocet and is unable to refill it.     Please advise

## 2023-07-10 DIAGNOSIS — I25.10 CAD, MULTIPLE VESSEL: ICD-10-CM

## 2023-07-10 DIAGNOSIS — I10 PRIMARY HYPERTENSION: ICD-10-CM

## 2023-07-10 RX ORDER — CYCLOBENZAPRINE HCL 10 MG
10 TABLET ORAL 2 TIMES DAILY PRN
Qty: 60 TABLET | Refills: 0 | Status: SHIPPED | OUTPATIENT
Start: 2023-07-10 | End: 2023-08-09

## 2023-07-10 RX ORDER — ASPIRIN 81 MG/1
81 TABLET ORAL DAILY
Qty: 90 TABLET | Refills: 0 | Status: SHIPPED | OUTPATIENT
Start: 2023-07-10 | End: 2023-10-08

## 2023-07-10 RX ORDER — BLOOD PRESSURE TEST KIT
KIT MISCELLANEOUS
Qty: 1 KIT | Refills: 0 | Status: SHIPPED | OUTPATIENT
Start: 2023-07-10

## 2023-07-10 NOTE — TELEPHONE ENCOUNTER
Wondering about getting a blood pressure kit also? Last Appt. 6/7/2023   Next Appt.  7/19/2023   RX Pended

## 2023-07-19 ENCOUNTER — OFFICE VISIT (OUTPATIENT)
Dept: FAMILY MEDICINE CLINIC | Age: 53
End: 2023-07-19
Payer: COMMERCIAL

## 2023-07-19 VITALS
OXYGEN SATURATION: 95 % | DIASTOLIC BLOOD PRESSURE: 82 MMHG | WEIGHT: 156.2 LBS | TEMPERATURE: 97.5 F | HEART RATE: 102 BPM | SYSTOLIC BLOOD PRESSURE: 136 MMHG | BODY MASS INDEX: 22.41 KG/M2 | RESPIRATION RATE: 18 BRPM

## 2023-07-19 DIAGNOSIS — M48.07 LUMBOSACRAL SPINAL STENOSIS: ICD-10-CM

## 2023-07-19 DIAGNOSIS — I10 PRIMARY HYPERTENSION: Primary | ICD-10-CM

## 2023-07-19 DIAGNOSIS — E78.49 OTHER HYPERLIPIDEMIA: ICD-10-CM

## 2023-07-19 DIAGNOSIS — E11.9 TYPE 2 DIABETES MELLITUS WITHOUT COMPLICATION, WITHOUT LONG-TERM CURRENT USE OF INSULIN (HCC): ICD-10-CM

## 2023-07-19 PROCEDURE — 3051F HG A1C>EQUAL 7.0%<8.0%: CPT | Performed by: STUDENT IN AN ORGANIZED HEALTH CARE EDUCATION/TRAINING PROGRAM

## 2023-07-19 PROCEDURE — 99213 OFFICE O/P EST LOW 20 MIN: CPT | Performed by: STUDENT IN AN ORGANIZED HEALTH CARE EDUCATION/TRAINING PROGRAM

## 2023-07-19 PROCEDURE — 3079F DIAST BP 80-89 MM HG: CPT | Performed by: STUDENT IN AN ORGANIZED HEALTH CARE EDUCATION/TRAINING PROGRAM

## 2023-07-19 PROCEDURE — 3074F SYST BP LT 130 MM HG: CPT | Performed by: STUDENT IN AN ORGANIZED HEALTH CARE EDUCATION/TRAINING PROGRAM

## 2023-07-19 RX ORDER — CYCLOBENZAPRINE HCL 10 MG
10 TABLET ORAL 2 TIMES DAILY PRN
Qty: 60 TABLET | Refills: 0 | Status: SHIPPED | OUTPATIENT
Start: 2023-07-19 | End: 2023-08-18

## 2023-07-19 ASSESSMENT — PATIENT HEALTH QUESTIONNAIRE - PHQ9
SUM OF ALL RESPONSES TO PHQ QUESTIONS 1-9: 0
1. LITTLE INTEREST OR PLEASURE IN DOING THINGS: 0
SUM OF ALL RESPONSES TO PHQ QUESTIONS 1-9: 0
SUM OF ALL RESPONSES TO PHQ9 QUESTIONS 1 & 2: 0
2. FEELING DOWN, DEPRESSED OR HOPELESS: 0
SUM OF ALL RESPONSES TO PHQ QUESTIONS 1-9: 0
SUM OF ALL RESPONSES TO PHQ QUESTIONS 1-9: 0

## 2023-07-19 ASSESSMENT — ENCOUNTER SYMPTOMS
DIARRHEA: 0
BACK PAIN: 1
VOMITING: 0
ABDOMINAL PAIN: 0
COUGH: 0
CONSTIPATION: 0
NAUSEA: 0
SHORTNESS OF BREATH: 0

## 2023-07-19 NOTE — PROGRESS NOTES
Lou Bliss (:  1970) is a 52 y.o. male,Established patient, here for evaluation of the following chief complaint(s):  Hypertension (6 week follow up), Health Maintenance (Pneumococcal Vaccine; Diabetic Foot Exam; Diabetic Eye Exam; Hepatitis B Vaccine; Tdap Vaccine; Colonoscopy; Shingles Vaccine; CT Lung Screening; Covid Booster; Lipid), Fall (2023- left foot went out, and went down- had PT this morning), and Insomnia (Sleeping 2-3 hours- pain uncontrollable. Taking 600mg of ibuprofen, does not help. 8/10 pain)         ASSESSMENT/PLAN:  1. Primary hypertension  -     CBC with Auto Differential; Future  -     Comprehensive Metabolic Panel; Future  -     Microalbumin / Creatinine Urine Ratio; Future  2. Lumbosacral spinal stenosis  -     cyclobenzaprine (FLEXERIL) 10 MG tablet; Take 1 tablet by mouth 2 times daily as needed for Muscle spasms, Disp-60 tablet, R-0Normal  3. Type 2 diabetes mellitus without complication, without long-term current use of insulin ([facility])  -     Comprehensive Metabolic Panel; Future  -     Hemoglobin A1C; Future  4. Other hyperlipidemia  -     Lipid Panel; Future  47-year-old male presents office to follow-up on multiple chronic conditions. Hypertension: Chronic, well controlled. Blood pressure today 136/82. Patient has been able to titrate down on medication is currently not taking many of his meds. He has been able to cut down on just purely from cutting back on alcohol use. We will continue to monitor blood pressure and follow-up closely in 3 months for review. Lumbosacral spinal stenosis: Chronic, uncontrolled. Patient continues to have significant pain status post surgical procedure with orthopedics. Continue to follow-up with surgical team for further recommendations. Continue physical therapy. We will continue muscle relaxer as needed. History of type 2 diabetes and hyperlipidemia.   We will plan to check blood work and follow-up in 3

## 2023-09-18 DIAGNOSIS — I10 PRIMARY HYPERTENSION: ICD-10-CM

## 2023-09-18 RX ORDER — HYDROCHLOROTHIAZIDE 25 MG/1
TABLET ORAL
Qty: 90 TABLET | Refills: 0 | Status: SHIPPED | OUTPATIENT
Start: 2023-09-18

## 2023-10-04 DIAGNOSIS — I25.10 CAD, MULTIPLE VESSEL: ICD-10-CM

## 2023-10-04 RX ORDER — ASPIRIN 81 MG/1
81 TABLET, COATED ORAL DAILY
Qty: 30 TABLET | Refills: 2 | Status: SHIPPED | OUTPATIENT
Start: 2023-10-04

## 2023-10-09 DIAGNOSIS — I10 PRIMARY HYPERTENSION: ICD-10-CM

## 2023-10-10 ENCOUNTER — HOSPITAL ENCOUNTER (OUTPATIENT)
Age: 53
Setting detail: SPECIMEN
Discharge: HOME | End: 2023-10-10

## 2023-10-10 ENCOUNTER — NURSE ONLY (OUTPATIENT)
Dept: FAMILY MEDICINE CLINIC | Age: 53
End: 2023-10-10
Payer: COMMERCIAL

## 2023-10-10 DIAGNOSIS — I10 PRIMARY HYPERTENSION: ICD-10-CM

## 2023-10-10 DIAGNOSIS — E13.69 OTHER SPECIFIED DIABETES MELLITUS WITH OTHER SPECIFIED COMPLICATION, UNSPECIFIED WHETHER LONG TERM INSULIN USE (HCC): ICD-10-CM

## 2023-10-10 DIAGNOSIS — I10 ESSENTIAL HYPERTENSION, MALIGNANT: ICD-10-CM

## 2023-10-10 DIAGNOSIS — E78.49 OTHER HYPERLIPIDEMIA: ICD-10-CM

## 2023-10-10 DIAGNOSIS — E11.9 TYPE 2 DIABETES MELLITUS WITHOUT COMPLICATION, WITHOUT LONG-TERM CURRENT USE OF INSULIN (HCC): ICD-10-CM

## 2023-10-10 DIAGNOSIS — E78.49 FAMILIAL COMBINED HYPERLIPIDEMIA: Primary | ICD-10-CM

## 2023-10-10 LAB
ALBUMIN SERPL-MCNC: 3.8 G/DL (ref 3.5–5.2)
ALBUMIN/GLOB SERPL: 1 {RATIO} (ref 1–2.5)
ALP SERPL-CCNC: 150 U/L (ref 40–129)
ALT SERPL-CCNC: 8 U/L (ref 5–41)
ANION GAP SERPL CALCULATED.3IONS-SCNC: 14 MMOL/L (ref 9–17)
AST SERPL-CCNC: 14 U/L
BASOPHILS # BLD: 0.05 K/UL (ref 0–0.2)
BASOPHILS NFR BLD: 1 % (ref 0–2)
BILIRUB SERPL-MCNC: 0.4 MG/DL (ref 0.3–1.2)
BUN SERPL-MCNC: 8 MG/DL (ref 6–20)
CALCIUM SERPL-MCNC: 10 MG/DL (ref 8.6–10.4)
CHLORIDE SERPL-SCNC: 94 MMOL/L (ref 98–107)
CHOLEST SERPL-MCNC: 198 MG/DL
CHOLESTEROL/HDL RATIO: 5.8
CO2 SERPL-SCNC: 24 MMOL/L (ref 20–31)
CREAT SERPL-MCNC: 0.7 MG/DL (ref 0.7–1.2)
EOSINOPHIL # BLD: 0.23 K/UL (ref 0–0.44)
EOSINOPHILS RELATIVE PERCENT: 3 % (ref 1–4)
ERYTHROCYTE [DISTWIDTH] IN BLOOD BY AUTOMATED COUNT: 13.4 % (ref 11.8–14.4)
GFR SERPL CREATININE-BSD FRML MDRD: >60 ML/MIN/1.73M2
GLUCOSE SERPL-MCNC: 227 MG/DL (ref 70–99)
HCT VFR BLD AUTO: 37.7 % (ref 40.7–50.3)
HDLC SERPL-MCNC: 34 MG/DL
HGB BLD-MCNC: 12.2 G/DL (ref 13–17)
IMM GRANULOCYTES # BLD AUTO: 0.04 K/UL (ref 0–0.3)
IMM GRANULOCYTES NFR BLD: 1 %
LDLC SERPL CALC-MCNC: 138 MG/DL (ref 0–130)
LYMPHOCYTES NFR BLD: 2.48 K/UL (ref 1.1–3.7)
LYMPHOCYTES RELATIVE PERCENT: 31 % (ref 24–43)
MCH RBC QN AUTO: 30.8 PG (ref 25.2–33.5)
MCHC RBC AUTO-ENTMCNC: 32.4 G/DL (ref 28.4–34.8)
MCV RBC AUTO: 95.2 FL (ref 82.6–102.9)
MONOCYTES NFR BLD: 0.57 K/UL (ref 0.1–1.2)
MONOCYTES NFR BLD: 7 % (ref 3–12)
NEUTROPHILS NFR BLD: 57 % (ref 36–65)
NEUTS SEG NFR BLD: 4.67 K/UL (ref 1.5–8.1)
NRBC BLD-RTO: 0 PER 100 WBC
PLATELET # BLD AUTO: 419 K/UL (ref 138–453)
PMV BLD AUTO: 9.5 FL (ref 8.1–13.5)
POTASSIUM SERPL-SCNC: 4.7 MMOL/L (ref 3.7–5.3)
PROT SERPL-MCNC: 7.7 G/DL (ref 6.4–8.3)
RBC # BLD AUTO: 3.96 M/UL (ref 4.21–5.77)
SODIUM SERPL-SCNC: 132 MMOL/L (ref 135–144)
TRIGL SERPL-MCNC: 132 MG/DL
WBC OTHER # BLD: 8 K/UL (ref 3.5–11.3)

## 2023-10-10 PROCEDURE — 36415 COLL VENOUS BLD VENIPUNCTURE: CPT | Performed by: NURSE PRACTITIONER

## 2023-10-10 NOTE — PROGRESS NOTES
Venipuncture obtained from  right arm. Patient tolerated the procedure without complications or complaints.     1 PST 1 LVV

## 2023-10-11 LAB
EST. AVERAGE GLUCOSE BLD GHB EST-MCNC: 235 MG/DL
HBA1C MFR BLD: 9.8 % (ref 4–6)

## 2023-10-31 ENCOUNTER — OFFICE VISIT (OUTPATIENT)
Dept: FAMILY MEDICINE CLINIC | Age: 53
End: 2023-10-31
Payer: COMMERCIAL

## 2023-10-31 VITALS
SYSTOLIC BLOOD PRESSURE: 164 MMHG | WEIGHT: 163.4 LBS | OXYGEN SATURATION: 94 % | TEMPERATURE: 96.9 F | RESPIRATION RATE: 18 BRPM | DIASTOLIC BLOOD PRESSURE: 102 MMHG | HEART RATE: 104 BPM | BODY MASS INDEX: 23.45 KG/M2

## 2023-10-31 DIAGNOSIS — Z23 NEED FOR TDAP VACCINATION: ICD-10-CM

## 2023-10-31 DIAGNOSIS — E66.3 OVERWEIGHT (BMI 25.0-29.9): ICD-10-CM

## 2023-10-31 DIAGNOSIS — I44.7 INCOMPLETE LEFT BUNDLE BRANCH BLOCK: ICD-10-CM

## 2023-10-31 DIAGNOSIS — E11.9 TYPE 2 DIABETES MELLITUS WITHOUT COMPLICATION, WITHOUT LONG-TERM CURRENT USE OF INSULIN (HCC): ICD-10-CM

## 2023-10-31 DIAGNOSIS — Z23 NEED FOR PNEUMOCOCCAL VACCINE: ICD-10-CM

## 2023-10-31 DIAGNOSIS — E78.49 OTHER HYPERLIPIDEMIA: ICD-10-CM

## 2023-10-31 DIAGNOSIS — Z95.1 HX OF CABG: ICD-10-CM

## 2023-10-31 DIAGNOSIS — K21.9 GASTROESOPHAGEAL REFLUX DISEASE WITHOUT ESOPHAGITIS: ICD-10-CM

## 2023-10-31 DIAGNOSIS — I10 PRIMARY HYPERTENSION: Primary | ICD-10-CM

## 2023-10-31 DIAGNOSIS — I25.10 MULTIPLE VESSEL CORONARY ARTERY DISEASE: ICD-10-CM

## 2023-10-31 DIAGNOSIS — E87.1 HYPONATREMIA: ICD-10-CM

## 2023-10-31 PROCEDURE — 99214 OFFICE O/P EST MOD 30 MIN: CPT | Performed by: STUDENT IN AN ORGANIZED HEALTH CARE EDUCATION/TRAINING PROGRAM

## 2023-10-31 PROCEDURE — 3080F DIAST BP >= 90 MM HG: CPT | Performed by: STUDENT IN AN ORGANIZED HEALTH CARE EDUCATION/TRAINING PROGRAM

## 2023-10-31 PROCEDURE — 3046F HEMOGLOBIN A1C LEVEL >9.0%: CPT | Performed by: STUDENT IN AN ORGANIZED HEALTH CARE EDUCATION/TRAINING PROGRAM

## 2023-10-31 PROCEDURE — 3077F SYST BP >= 140 MM HG: CPT | Performed by: STUDENT IN AN ORGANIZED HEALTH CARE EDUCATION/TRAINING PROGRAM

## 2023-10-31 RX ORDER — DIAZEPAM 5 MG/1
TABLET ORAL
COMMUNITY
Start: 2023-08-07

## 2023-10-31 RX ORDER — CYCLOBENZAPRINE HCL 10 MG
TABLET ORAL
COMMUNITY
Start: 2023-10-03

## 2023-10-31 ASSESSMENT — ENCOUNTER SYMPTOMS
EYE PAIN: 0
CONSTIPATION: 0
EYE REDNESS: 0
NAUSEA: 0
ABDOMINAL PAIN: 0
COUGH: 0
DIARRHEA: 0
SHORTNESS OF BREATH: 0
RHINORRHEA: 0
VOMITING: 0

## 2023-10-31 ASSESSMENT — PATIENT HEALTH QUESTIONNAIRE - PHQ9
SUM OF ALL RESPONSES TO PHQ QUESTIONS 1-9: 0
SUM OF ALL RESPONSES TO PHQ9 QUESTIONS 1 & 2: 0
SUM OF ALL RESPONSES TO PHQ QUESTIONS 1-9: 0
2. FEELING DOWN, DEPRESSED OR HOPELESS: 0
SUM OF ALL RESPONSES TO PHQ QUESTIONS 1-9: 0
1. LITTLE INTEREST OR PLEASURE IN DOING THINGS: 0
SUM OF ALL RESPONSES TO PHQ QUESTIONS 1-9: 0

## 2023-10-31 NOTE — PROGRESS NOTES
Aguila Barrientos (:  1970) is a 52 y.o. male,Established patient, here for evaluation of the following chief complaint(s):  Diabetes (Follow up ), Hypertension (Follow up ), Health Maintenance (Vaccines; diabetic foot exam; diabetic retinal exam; ), and Medication Check (Stopped taking all medications )         ASSESSMENT/PLAN:  1. Primary hypertension  2. Incomplete left bundle branch block  3. Hx of CABG  4. Multiple vessel coronary artery disease  5. Gastroesophageal reflux disease without esophagitis  6. Type 2 diabetes mellitus without complication, without long-term current use of insulin (HCC)  7. Other hyperlipidemia  8. Overweight (BMI 25.0-29.9)  9. Hyponatremia  10. Need for Tdap vaccination  11. Need for pneumococcal vaccine  64-year-old male presents the office to follow-up on multiple chronic conditions and medications. Patient has a long history of hypertension, history of incomplete left bundle, history of CABG, coronary artery disease, GERD, type 2 diabetes that is uncontrolled, hyperlipidemia, overweight BMI, chronic hyponatremia. After long discussion with patient about his abnormal blood work and things that he would have to do to get back on track patient is not interested in medication assisted therapy or any medication at this time. He is not interested in following up here any further for chronic condition review, labs, medications. He is not interested in taking medication any longer. At this time patient would like to stay off all medications and not follow-up for routine care or perform any preventative measures. Patient has no further questions today.    -Patient was educated on risks associated with elevated blood pressure over time as well as elevated glucose over time and not taking chronic medication regimen. Patient verbalized understanding of those risks but is not interested in following up for chronic care any longer.   Patient should follow-up if interested in

## 2025-04-02 ENCOUNTER — OFFICE VISIT (OUTPATIENT)
Dept: FAMILY MEDICINE CLINIC | Age: 55
End: 2025-04-02
Payer: COMMERCIAL

## 2025-04-02 VITALS
WEIGHT: 151 LBS | RESPIRATION RATE: 24 BRPM | HEIGHT: 70 IN | SYSTOLIC BLOOD PRESSURE: 156 MMHG | OXYGEN SATURATION: 100 % | DIASTOLIC BLOOD PRESSURE: 90 MMHG | BODY MASS INDEX: 21.62 KG/M2 | HEART RATE: 99 BPM | TEMPERATURE: 97.5 F

## 2025-04-02 DIAGNOSIS — I10 PRIMARY HYPERTENSION: ICD-10-CM

## 2025-04-02 DIAGNOSIS — M47.816 LUMBAR SPONDYLOSIS: ICD-10-CM

## 2025-04-02 DIAGNOSIS — M48.07 LUMBOSACRAL SPINAL STENOSIS: ICD-10-CM

## 2025-04-02 DIAGNOSIS — Z98.1 HISTORY OF LUMBAR FUSION: ICD-10-CM

## 2025-04-02 DIAGNOSIS — Z13.31 POSITIVE DEPRESSION SCREENING: ICD-10-CM

## 2025-04-02 DIAGNOSIS — M54.16 LUMBAR RADICULOPATHY: Primary | ICD-10-CM

## 2025-04-02 DIAGNOSIS — M48.062 LUMBAR STENOSIS WITH NEUROGENIC CLAUDICATION: ICD-10-CM

## 2025-04-02 PROCEDURE — 3080F DIAST BP >= 90 MM HG: CPT | Performed by: STUDENT IN AN ORGANIZED HEALTH CARE EDUCATION/TRAINING PROGRAM

## 2025-04-02 PROCEDURE — 99214 OFFICE O/P EST MOD 30 MIN: CPT | Performed by: STUDENT IN AN ORGANIZED HEALTH CARE EDUCATION/TRAINING PROGRAM

## 2025-04-02 PROCEDURE — 3077F SYST BP >= 140 MM HG: CPT | Performed by: STUDENT IN AN ORGANIZED HEALTH CARE EDUCATION/TRAINING PROGRAM

## 2025-04-02 RX ORDER — CYCLOBENZAPRINE HCL 10 MG
10 TABLET ORAL 3 TIMES DAILY PRN
Qty: 90 TABLET | Refills: 0 | Status: SHIPPED | OUTPATIENT
Start: 2025-04-02 | End: 2025-05-02

## 2025-04-02 SDOH — ECONOMIC STABILITY: FOOD INSECURITY: WITHIN THE PAST 12 MONTHS, THE FOOD YOU BOUGHT JUST DIDN'T LAST AND YOU DIDN'T HAVE MONEY TO GET MORE.: NEVER TRUE

## 2025-04-02 SDOH — ECONOMIC STABILITY: FOOD INSECURITY: WITHIN THE PAST 12 MONTHS, YOU WORRIED THAT YOUR FOOD WOULD RUN OUT BEFORE YOU GOT MONEY TO BUY MORE.: NEVER TRUE

## 2025-04-02 ASSESSMENT — PATIENT HEALTH QUESTIONNAIRE - PHQ9
SUM OF ALL RESPONSES TO PHQ QUESTIONS 1-9: 18
8. MOVING OR SPEAKING SO SLOWLY THAT OTHER PEOPLE COULD HAVE NOTICED. OR THE OPPOSITE, BEING SO FIGETY OR RESTLESS THAT YOU HAVE BEEN MOVING AROUND A LOT MORE THAN USUAL: NEARLY EVERY DAY
9. THOUGHTS THAT YOU WOULD BE BETTER OFF DEAD, OR OF HURTING YOURSELF: NOT AT ALL
2. FEELING DOWN, DEPRESSED OR HOPELESS: NEARLY EVERY DAY
5. POOR APPETITE OR OVEREATING: NEARLY EVERY DAY
4. FEELING TIRED OR HAVING LITTLE ENERGY: NEARLY EVERY DAY
SUM OF ALL RESPONSES TO PHQ QUESTIONS 1-9: 18
7. TROUBLE CONCENTRATING ON THINGS, SUCH AS READING THE NEWSPAPER OR WATCHING TELEVISION: NOT AT ALL
SUM OF ALL RESPONSES TO PHQ QUESTIONS 1-9: 18
3. TROUBLE FALLING OR STAYING ASLEEP: NEARLY EVERY DAY
SUM OF ALL RESPONSES TO PHQ QUESTIONS 1-9: 18
10. IF YOU CHECKED OFF ANY PROBLEMS, HOW DIFFICULT HAVE THESE PROBLEMS MADE IT FOR YOU TO DO YOUR WORK, TAKE CARE OF THINGS AT HOME, OR GET ALONG WITH OTHER PEOPLE: VERY DIFFICULT
6. FEELING BAD ABOUT YOURSELF - OR THAT YOU ARE A FAILURE OR HAVE LET YOURSELF OR YOUR FAMILY DOWN: NOT AT ALL
1. LITTLE INTEREST OR PLEASURE IN DOING THINGS: NEARLY EVERY DAY

## 2025-04-02 ASSESSMENT — ENCOUNTER SYMPTOMS
COUGH: 0
ABDOMINAL PAIN: 0
EYE PAIN: 0
CONSTIPATION: 0
BACK PAIN: 1
SHORTNESS OF BREATH: 0
NAUSEA: 0
DIARRHEA: 0
EYE REDNESS: 0
VOMITING: 0

## 2025-04-02 NOTE — PROGRESS NOTES
Florentino Philippe (:  1970) is a 54 y.o. male,Established patient, here for evaluation of the following chief complaint(s):  New Patient (Back pain, hasn't walked wihtout a walker in 2+years. )      Assessment & Plan   ASSESSMENT/PLAN:  1. Lumbar radiculopathy  -     cyclobenzaprine (FLEXERIL) 10 MG tablet; Take 1 tablet by mouth 3 times daily as needed for Muscle spasms, Disp-90 tablet, R-0Normal  2. Lumbar spondylosis  -     cyclobenzaprine (FLEXERIL) 10 MG tablet; Take 1 tablet by mouth 3 times daily as needed for Muscle spasms, Disp-90 tablet, R-0Normal  3. Lumbosacral spinal stenosis  -     cyclobenzaprine (FLEXERIL) 10 MG tablet; Take 1 tablet by mouth 3 times daily as needed for Muscle spasms, Disp-90 tablet, R-0Normal  4. Lumbar stenosis with neurogenic claudication  5. History of lumbar fusion  6. Primary hypertension  7. Positive depression screening  54-year-old male presents the office for concerns of back pain.  Patient has not been seen in this office for about a year and a half at last visit patient was not interested in discussing his chronic conditions consistent with hypertension, bundle branch block, coronary artery disease, history of stroke, uncontrolled type 2 diabetes.  At this time patient states he still not interested in chronic condition management or chronic condition review.  Not interested in medication management for his uncontrolled chronic conditions.  His concern is due to his uncontrolled back pain.  Has a pertinent history of lumbar fusion L2-S1 decompression, L2-S1 posterior fusion, L5-S1 TLIF by orthopedic spine approximately 2 years ago.  Since that time patient has not been able to be mobile has been pretty much wheelchair-bound with severe debilitating back pain.  Patient is interested in getting referral to a new orthospine provider and to get something for pain.  Patient does have some lumbar paraspinal hypertonicity around the area of previous surgery.  Will give

## 2025-08-15 ENCOUNTER — HOSPITAL ENCOUNTER (OUTPATIENT)
Dept: PREADMISSION TESTING | Age: 55
Discharge: HOME | End: 2025-08-19
Payer: COMMERCIAL

## 2025-08-15 ENCOUNTER — HOSPITAL ENCOUNTER (OUTPATIENT)
Dept: GENERAL RADIOLOGY | Age: 55
Discharge: HOME | End: 2025-08-15
Payer: COMMERCIAL

## 2025-08-15 VITALS
RESPIRATION RATE: 16 BRPM | HEIGHT: 70 IN | SYSTOLIC BLOOD PRESSURE: 178 MMHG | WEIGHT: 144.62 LBS | OXYGEN SATURATION: 100 % | HEART RATE: 82 BPM | DIASTOLIC BLOOD PRESSURE: 73 MMHG | BODY MASS INDEX: 20.7 KG/M2 | TEMPERATURE: 97.6 F

## 2025-08-15 DIAGNOSIS — Z01.818 PREOP EXAMINATION: ICD-10-CM

## 2025-08-15 DIAGNOSIS — M48.07 LUMBOSACRAL STENOSIS: ICD-10-CM

## 2025-08-15 LAB
ANION GAP SERPL CALC-SCNC: 10 MEQ/L (ref 8–16)
APTT PPP: 29.5 SECONDS (ref 22–38)
BUN SERPL-MCNC: 22 MG/DL (ref 8–23)
CALCIUM SERPL-MCNC: 10.1 MG/DL (ref 8.5–10.5)
CHLORIDE SERPL-SCNC: 99 MEQ/L (ref 98–111)
CO2 SERPL-SCNC: 26 MEQ/L (ref 22–29)
CREAT SERPL-MCNC: 1.1 MG/DL (ref 0.7–1.2)
DEPRECATED MEAN GLUCOSE BLD GHB EST-ACNC: 180 MG/DL (ref 70–126)
DEPRECATED RDW RBC AUTO: 49.7 FL (ref 35–45)
EKG Q-T INTERVAL: 382 MS
EKG QRS DURATION: 96 MS
EKG QTC CALCULATION (BAZETT): 420 MS
EKG R AXIS: 17 DEGREES
EKG T AXIS: 141 DEGREES
EKG VENTRICULAR RATE: 73 BPM
ERYTHROCYTE [DISTWIDTH] IN BLOOD BY AUTOMATED COUNT: 14.6 % (ref 11.5–14.5)
GFR SERPL CREATININE-BSD FRML MDRD: 78 ML/MIN/1.73M2
GLUCOSE SERPL-MCNC: 171 MG/DL (ref 74–109)
HBA1C MFR BLD HPLC: 8 % (ref 4–6)
HCT VFR BLD AUTO: 36.9 % (ref 42–52)
HGB BLD-MCNC: 12.3 GM/DL (ref 14–18)
INR PPP: 0.97 (ref 0.85–1.13)
MCH RBC QN AUTO: 31.1 PG (ref 26–33)
MCHC RBC AUTO-ENTMCNC: 33.3 GM/DL (ref 32.2–35.5)
MCV RBC AUTO: 93.2 FL (ref 80–94)
PLATELET # BLD AUTO: 292 THOU/MM3 (ref 130–400)
PMV BLD AUTO: 9.2 FL (ref 9.4–12.4)
POTASSIUM SERPL-SCNC: 4.8 MEQ/L (ref 3.5–5.2)
PROTHROMBIN TIME: 11.3 SECONDS (ref 10–13.5)
RBC # BLD AUTO: 3.96 MILL/MM3 (ref 4.7–6.1)
SODIUM SERPL-SCNC: 135 MEQ/L (ref 135–145)
WBC # BLD AUTO: 7 THOU/MM3 (ref 4.8–10.8)

## 2025-08-15 PROCEDURE — G0480 DRUG TEST DEF 1-7 CLASSES: HCPCS

## 2025-08-15 PROCEDURE — 87081 CULTURE SCREEN ONLY: CPT

## 2025-08-15 PROCEDURE — 83036 HEMOGLOBIN GLYCOSYLATED A1C: CPT

## 2025-08-15 PROCEDURE — 71046 X-RAY EXAM CHEST 2 VIEWS: CPT

## 2025-08-15 PROCEDURE — 85027 COMPLETE CBC AUTOMATED: CPT

## 2025-08-15 PROCEDURE — 93010 ELECTROCARDIOGRAM REPORT: CPT | Performed by: NUCLEAR MEDICINE

## 2025-08-15 PROCEDURE — 80048 BASIC METABOLIC PNL TOTAL CA: CPT

## 2025-08-15 PROCEDURE — 36415 COLL VENOUS BLD VENIPUNCTURE: CPT

## 2025-08-15 PROCEDURE — 93005 ELECTROCARDIOGRAM TRACING: CPT | Performed by: ORTHOPAEDIC SURGERY

## 2025-08-15 PROCEDURE — 85730 THROMBOPLASTIN TIME PARTIAL: CPT

## 2025-08-15 PROCEDURE — 85610 PROTHROMBIN TIME: CPT

## 2025-08-15 ASSESSMENT — PAIN DESCRIPTION - ORIENTATION: ORIENTATION: LOWER;LEFT;RIGHT

## 2025-08-15 ASSESSMENT — PAIN SCALES - GENERAL: PAINLEVEL_OUTOF10: 9

## 2025-08-15 ASSESSMENT — PAIN DESCRIPTION - FREQUENCY: FREQUENCY: CONTINUOUS

## 2025-08-15 ASSESSMENT — PAIN DESCRIPTION - DESCRIPTORS: DESCRIPTORS: SHARP;SHOOTING

## 2025-08-15 ASSESSMENT — PAIN DESCRIPTION - LOCATION: LOCATION: BACK

## 2025-08-16 LAB — MRSA SPEC QL CULT: NORMAL

## 2025-08-19 ENCOUNTER — OFFICE VISIT (OUTPATIENT)
Dept: FAMILY MEDICINE CLINIC | Age: 55
End: 2025-08-19
Payer: COMMERCIAL

## 2025-08-19 VITALS
WEIGHT: 145.8 LBS | BODY MASS INDEX: 20.87 KG/M2 | TEMPERATURE: 96.9 F | SYSTOLIC BLOOD PRESSURE: 144 MMHG | DIASTOLIC BLOOD PRESSURE: 82 MMHG | HEART RATE: 82 BPM

## 2025-08-19 DIAGNOSIS — M48.07 LUMBOSACRAL SPINAL STENOSIS: ICD-10-CM

## 2025-08-19 DIAGNOSIS — E78.49 OTHER HYPERLIPIDEMIA: ICD-10-CM

## 2025-08-19 DIAGNOSIS — I44.7 INCOMPLETE LEFT BUNDLE BRANCH BLOCK: ICD-10-CM

## 2025-08-19 DIAGNOSIS — Z01.818 PREOPERATIVE EXAMINATION: Primary | ICD-10-CM

## 2025-08-19 DIAGNOSIS — I10 PRIMARY HYPERTENSION: ICD-10-CM

## 2025-08-19 DIAGNOSIS — Z95.1 HX OF CABG: ICD-10-CM

## 2025-08-19 DIAGNOSIS — M48.062 LUMBAR STENOSIS WITH NEUROGENIC CLAUDICATION: ICD-10-CM

## 2025-08-19 DIAGNOSIS — K21.9 GASTROESOPHAGEAL REFLUX DISEASE WITHOUT ESOPHAGITIS: ICD-10-CM

## 2025-08-19 DIAGNOSIS — I63.9 CEREBROVASCULAR ACCIDENT (CVA), UNSPECIFIED MECHANISM (HCC): ICD-10-CM

## 2025-08-19 DIAGNOSIS — I25.10 MULTIPLE VESSEL CORONARY ARTERY DISEASE: ICD-10-CM

## 2025-08-19 DIAGNOSIS — Z87.891 HX OF SMOKING: ICD-10-CM

## 2025-08-19 DIAGNOSIS — E11.9 TYPE 2 DIABETES MELLITUS WITHOUT COMPLICATION, WITHOUT LONG-TERM CURRENT USE OF INSULIN (HCC): ICD-10-CM

## 2025-08-19 PROBLEM — Z72.0 TOBACCO USE: Status: RESOLVED | Noted: 2021-03-25 | Resolved: 2025-08-19

## 2025-08-19 LAB
COTININE SERPL-MCNC: 26 NG/ML
NICOTINE SERPL-MCNC: < 5 NG/ML

## 2025-08-19 PROCEDURE — 99214 OFFICE O/P EST MOD 30 MIN: CPT | Performed by: STUDENT IN AN ORGANIZED HEALTH CARE EDUCATION/TRAINING PROGRAM

## 2025-08-19 PROCEDURE — 3077F SYST BP >= 140 MM HG: CPT | Performed by: STUDENT IN AN ORGANIZED HEALTH CARE EDUCATION/TRAINING PROGRAM

## 2025-08-19 PROCEDURE — 3079F DIAST BP 80-89 MM HG: CPT | Performed by: STUDENT IN AN ORGANIZED HEALTH CARE EDUCATION/TRAINING PROGRAM

## 2025-08-19 PROCEDURE — 3052F HG A1C>EQUAL 8.0%<EQUAL 9.0%: CPT | Performed by: STUDENT IN AN ORGANIZED HEALTH CARE EDUCATION/TRAINING PROGRAM

## 2025-08-25 ENCOUNTER — OFFICE VISIT (OUTPATIENT)
Age: 55
End: 2025-08-25
Payer: COMMERCIAL

## 2025-08-25 VITALS
WEIGHT: 145 LBS | BODY MASS INDEX: 20.76 KG/M2 | HEART RATE: 87 BPM | SYSTOLIC BLOOD PRESSURE: 135 MMHG | HEIGHT: 70 IN | DIASTOLIC BLOOD PRESSURE: 64 MMHG

## 2025-08-25 DIAGNOSIS — E78.5 HYPERLIPIDEMIA, UNSPECIFIED HYPERLIPIDEMIA TYPE: ICD-10-CM

## 2025-08-25 DIAGNOSIS — I25.10 CORONARY ARTERY DISEASE INVOLVING NATIVE CORONARY ARTERY OF NATIVE HEART WITHOUT ANGINA PECTORIS: ICD-10-CM

## 2025-08-25 DIAGNOSIS — R06.09 DOE (DYSPNEA ON EXERTION): ICD-10-CM

## 2025-08-25 DIAGNOSIS — Z95.1 S/P CABG X 3: ICD-10-CM

## 2025-08-25 DIAGNOSIS — I35.0 NONRHEUMATIC AORTIC VALVE STENOSIS: ICD-10-CM

## 2025-08-25 DIAGNOSIS — R94.31 ABNORMAL ELECTROCARDIOGRAPHY: ICD-10-CM

## 2025-08-25 DIAGNOSIS — R53.83 OTHER FATIGUE: Primary | ICD-10-CM

## 2025-08-25 DIAGNOSIS — Z01.810 PRE-OPERATIVE CARDIOVASCULAR EXAMINATION: ICD-10-CM

## 2025-08-25 DIAGNOSIS — I10 PRIMARY HYPERTENSION: ICD-10-CM

## 2025-08-25 PROCEDURE — 3078F DIAST BP <80 MM HG: CPT | Performed by: INTERNAL MEDICINE

## 2025-08-25 PROCEDURE — 99204 OFFICE O/P NEW MOD 45 MIN: CPT | Performed by: INTERNAL MEDICINE

## 2025-08-25 PROCEDURE — 3075F SYST BP GE 130 - 139MM HG: CPT | Performed by: INTERNAL MEDICINE

## 2025-08-25 PROCEDURE — 93000 ELECTROCARDIOGRAM COMPLETE: CPT | Performed by: INTERNAL MEDICINE

## 2025-08-25 RX ORDER — ASPIRIN 81 MG/1
81 TABLET ORAL DAILY
Qty: 90 TABLET | Refills: 1 | Status: SHIPPED | OUTPATIENT
Start: 2025-08-25

## 2025-08-26 ENCOUNTER — TELEPHONE (OUTPATIENT)
Dept: CARDIOLOGY CLINIC | Age: 55
End: 2025-08-26

## 2025-08-26 DIAGNOSIS — R94.39 ABNORMAL STRESS TEST: Primary | ICD-10-CM

## 2025-08-28 ENCOUNTER — HOSPITAL ENCOUNTER (OUTPATIENT)
Dept: NUCLEAR MEDICINE | Age: 55
Discharge: HOME | End: 2025-08-28
Attending: INTERNAL MEDICINE
Payer: COMMERCIAL

## 2025-08-28 ENCOUNTER — HOSPITAL ENCOUNTER (OUTPATIENT)
Age: 55
Discharge: HOME | End: 2025-08-30
Attending: INTERNAL MEDICINE
Payer: COMMERCIAL

## 2025-08-28 VITALS — HEIGHT: 70 IN | BODY MASS INDEX: 20.62 KG/M2 | WEIGHT: 144 LBS

## 2025-08-28 DIAGNOSIS — Z95.1 S/P CABG X 3: ICD-10-CM

## 2025-08-28 DIAGNOSIS — I25.10 CORONARY ARTERY DISEASE INVOLVING NATIVE CORONARY ARTERY OF NATIVE HEART WITHOUT ANGINA PECTORIS: ICD-10-CM

## 2025-08-28 DIAGNOSIS — Z01.810 PRE-OPERATIVE CARDIOVASCULAR EXAMINATION: ICD-10-CM

## 2025-08-28 DIAGNOSIS — R94.31 ABNORMAL ELECTROCARDIOGRAPHY: ICD-10-CM

## 2025-08-28 LAB
ECHO BSA: 1.8 M2
NUC STRESS EJECTION FRACTION: 38 %
STRESS BASELINE DIAS BP: 56 MMHG
STRESS BASELINE HR: 70 BPM
STRESS BASELINE SYS BP: 159 MMHG
STRESS ESTIMATED WORKLOAD: 1 METS
STRESS PEAK DIAS BP: 65 MMHG
STRESS PEAK SYS BP: 161 MMHG
STRESS PERCENT HR ACHIEVED: 58 %
STRESS POST PEAK HR: 94 BPM
STRESS RATE PRESSURE PRODUCT: NORMAL BPM*MMHG
STRESS STAGE 1 BP: NORMAL MMHG
STRESS STAGE 1 DURATION: 1 MIN:SEC
STRESS STAGE 1 HR: 81 BPM
STRESS STAGE 2 BP: NORMAL MMHG
STRESS STAGE 2 DURATION: 1 MIN:SEC
STRESS STAGE 2 HR: 93 BPM
STRESS STAGE 3 BP: NORMAL MMHG
STRESS STAGE 3 DURATION: 1 MIN:SEC
STRESS STAGE 3 HR: 79 BPM
STRESS STAGE RECOVERY 1 BP: NORMAL MMHG
STRESS STAGE RECOVERY 1 DURATION: 1 MIN:SEC
STRESS STAGE RECOVERY 1 HR: 75 BPM
STRESS STAGE RECOVERY 2 BP: NORMAL MMHG
STRESS STAGE RECOVERY 2 DURATION: 1 MIN:SEC
STRESS STAGE RECOVERY 2 HR: 74 BPM
STRESS STAGE RECOVERY 3 BP: NORMAL MMHG
STRESS STAGE RECOVERY 3 DURATION: 1 MIN:SEC
STRESS STAGE RECOVERY 3 HR: 77 BPM
STRESS STAGE RECOVERY 4 BP: NORMAL MMHG
STRESS STAGE RECOVERY 4 DURATION: 2 MIN:SEC
STRESS STAGE RECOVERY 4 HR: 76 BPM
STRESS TARGET HR: 163 BPM
TID: 1.01

## 2025-08-28 PROCEDURE — 3430000000 HC RX DIAGNOSTIC RADIOPHARMACEUTICAL: Performed by: INTERNAL MEDICINE

## 2025-08-28 PROCEDURE — 6360000002 HC RX W HCPCS: Performed by: INTERNAL MEDICINE

## 2025-08-28 PROCEDURE — 93017 CV STRESS TEST TRACING ONLY: CPT

## 2025-08-28 PROCEDURE — 78452 HT MUSCLE IMAGE SPECT MULT: CPT

## 2025-08-28 PROCEDURE — A9500 TC99M SESTAMIBI: HCPCS | Performed by: INTERNAL MEDICINE

## 2025-08-28 RX ORDER — TETRAKIS(2-METHOXYISOBUTYLISOCYANIDE)COPPER(I) TETRAFLUOROBORATE 1 MG/ML
9.2 INJECTION, POWDER, LYOPHILIZED, FOR SOLUTION INTRAVENOUS
Status: COMPLETED | OUTPATIENT
Start: 2025-08-28 | End: 2025-08-28

## 2025-08-28 RX ORDER — TETRAKIS(2-METHOXYISOBUTYLISOCYANIDE)COPPER(I) TETRAFLUOROBORATE 1 MG/ML
31 INJECTION, POWDER, LYOPHILIZED, FOR SOLUTION INTRAVENOUS
Status: COMPLETED | OUTPATIENT
Start: 2025-08-28 | End: 2025-08-28

## 2025-08-28 RX ORDER — REGADENOSON 0.08 MG/ML
0.4 INJECTION, SOLUTION INTRAVENOUS
Status: COMPLETED | OUTPATIENT
Start: 2025-08-28 | End: 2025-08-28

## 2025-08-28 RX ADMIN — REGADENOSON 0.4 MG: 0.08 INJECTION, SOLUTION INTRAVENOUS at 12:57

## 2025-08-28 RX ADMIN — Medication 9.2 MILLICURIE: at 11:57

## 2025-08-28 RX ADMIN — Medication 31 MILLICURIE: at 13:02

## 2025-08-29 ENCOUNTER — TELEPHONE (OUTPATIENT)
Dept: CARDIOLOGY CLINIC | Age: 55
End: 2025-08-29

## 2025-08-29 PROBLEM — R94.39 ABNORMAL STRESS TEST: Status: ACTIVE | Noted: 2025-08-26

## 2025-09-02 ENCOUNTER — TELEPHONE (OUTPATIENT)
Dept: CARDIOLOGY CLINIC | Age: 55
End: 2025-09-02

## 2025-09-05 ENCOUNTER — TELEPHONE (OUTPATIENT)
Dept: CARDIOLOGY CLINIC | Age: 55
End: 2025-09-05

## 2025-09-05 ENCOUNTER — HOSPITAL ENCOUNTER (OUTPATIENT)
Age: 55
Discharge: HOME | End: 2025-09-06
Attending: INTERNAL MEDICINE | Admitting: INTERNAL MEDICINE
Payer: COMMERCIAL

## 2025-09-05 ENCOUNTER — APPOINTMENT (OUTPATIENT)
Dept: CT IMAGING | Age: 55
End: 2025-09-05
Attending: INTERNAL MEDICINE
Payer: COMMERCIAL

## 2025-09-05 DIAGNOSIS — E78.5 HYPERLIPIDEMIA, UNSPECIFIED HYPERLIPIDEMIA TYPE: ICD-10-CM

## 2025-09-05 DIAGNOSIS — I25.10 CORONARY ARTERY DISEASE INVOLVING NATIVE CORONARY ARTERY OF NATIVE HEART WITHOUT ANGINA PECTORIS: Primary | ICD-10-CM

## 2025-09-05 DIAGNOSIS — I73.9 PAD (PERIPHERAL ARTERY DISEASE): ICD-10-CM

## 2025-09-05 DIAGNOSIS — R94.39 ABNORMAL STRESS TEST: ICD-10-CM

## 2025-09-05 PROBLEM — Z98.890 S/P CARDIAC CATH: Status: ACTIVE | Noted: 2025-09-05

## 2025-09-05 LAB
ABO GROUP BLD: NORMAL
ANION GAP SERPL CALC-SCNC: 12 MEQ/L (ref 8–16)
APTT PPP: 28.1 SECONDS (ref 22–38)
BUN SERPL-MCNC: 20 MG/DL (ref 8–23)
CALCIUM SERPL-MCNC: 10.9 MG/DL (ref 8.5–10.5)
CHLORIDE SERPL-SCNC: 100 MEQ/L (ref 98–111)
CHOLEST SERPL-MCNC: 161 MG/DL (ref 100–199)
CO2 SERPL-SCNC: 24 MEQ/L (ref 22–29)
CREAT SERPL-MCNC: 1 MG/DL (ref 0.7–1.2)
DEPRECATED RDW RBC AUTO: 49.2 FL (ref 35–45)
ECHO BSA: 1.85 M2
EKG Q-T INTERVAL: 362 MS
EKG QRS DURATION: 86 MS
EKG QTC CALCULATION (BAZETT): 425 MS
EKG R AXIS: 38 DEGREES
EKG T AXIS: -166 DEGREES
EKG VENTRICULAR RATE: 83 BPM
ERYTHROCYTE [DISTWIDTH] IN BLOOD BY AUTOMATED COUNT: 14.1 % (ref 11.5–14.5)
GFR SERPL CREATININE-BSD FRML MDRD: 88 ML/MIN/1.73M2
GLUCOSE SERPL-MCNC: 149 MG/DL (ref 74–109)
HCT VFR BLD AUTO: 37.1 % (ref 42–52)
HDLC SERPL-MCNC: 45 MG/DL
HGB BLD-MCNC: 12.2 GM/DL (ref 14–18)
IAT IGG-SP REAG SERPL QL: NORMAL
INR PPP: 0.97 (ref 0.85–1.13)
LDLC SERPL CALC-MCNC: 100 MG/DL
MCH RBC QN AUTO: 31.4 PG (ref 26–33)
MCHC RBC AUTO-ENTMCNC: 32.9 GM/DL (ref 32.2–35.5)
MCV RBC AUTO: 95.4 FL (ref 80–94)
PLATELET # BLD AUTO: 281 THOU/MM3 (ref 130–400)
PMV BLD AUTO: 8.9 FL (ref 9.4–12.4)
POTASSIUM SERPL-SCNC: 4.5 MEQ/L (ref 3.5–5.2)
PROTHROMBIN TIME: 11.4 SECONDS (ref 10–13.5)
RBC # BLD AUTO: 3.89 MILL/MM3 (ref 4.7–6.1)
RH BLD: NORMAL
SODIUM SERPL-SCNC: 136 MEQ/L (ref 135–145)
TRIGL SERPL-MCNC: 81 MG/DL (ref 0–199)
WBC # BLD AUTO: 8.1 THOU/MM3 (ref 4.8–10.8)

## 2025-09-05 PROCEDURE — 80048 BASIC METABOLIC PNL TOTAL CA: CPT

## 2025-09-05 PROCEDURE — 80061 LIPID PANEL: CPT

## 2025-09-05 PROCEDURE — 2500000003 HC RX 250 WO HCPCS: Performed by: INTERNAL MEDICINE

## 2025-09-05 PROCEDURE — 86900 BLOOD TYPING SEROLOGIC ABO: CPT

## 2025-09-05 PROCEDURE — 93005 ELECTROCARDIOGRAM TRACING: CPT | Performed by: PHYSICIAN ASSISTANT

## 2025-09-05 PROCEDURE — 85027 COMPLETE CBC AUTOMATED: CPT

## 2025-09-05 PROCEDURE — 36415 COLL VENOUS BLD VENIPUNCTURE: CPT

## 2025-09-05 PROCEDURE — 2709999900 HC NON-CHARGEABLE SUPPLY: Performed by: INTERNAL MEDICINE

## 2025-09-05 PROCEDURE — 7100000010 HC PHASE II RECOVERY - FIRST 15 MIN: Performed by: INTERNAL MEDICINE

## 2025-09-05 PROCEDURE — 75635 CT ANGIO ABDOMINAL ARTERIES: CPT

## 2025-09-05 PROCEDURE — 85730 THROMBOPLASTIN TIME PARTIAL: CPT

## 2025-09-05 PROCEDURE — 6360000004 HC RX CONTRAST MEDICATION: Performed by: INTERNAL MEDICINE

## 2025-09-05 PROCEDURE — 86885 COOMBS TEST INDIRECT QUAL: CPT

## 2025-09-05 PROCEDURE — 2580000003 HC RX 258: Performed by: PHYSICIAN ASSISTANT

## 2025-09-05 PROCEDURE — 7100000011 HC PHASE II RECOVERY - ADDTL 15 MIN: Performed by: INTERNAL MEDICINE

## 2025-09-05 PROCEDURE — C1769 GUIDE WIRE: HCPCS | Performed by: INTERNAL MEDICINE

## 2025-09-05 PROCEDURE — 6360000002 HC RX W HCPCS: Performed by: INTERNAL MEDICINE

## 2025-09-05 PROCEDURE — C1894 INTRO/SHEATH, NON-LASER: HCPCS | Performed by: INTERNAL MEDICINE

## 2025-09-05 PROCEDURE — 85610 PROTHROMBIN TIME: CPT

## 2025-09-05 PROCEDURE — 86901 BLOOD TYPING SEROLOGIC RH(D): CPT

## 2025-09-05 PROCEDURE — 99152 MOD SED SAME PHYS/QHP 5/>YRS: CPT | Performed by: INTERNAL MEDICINE

## 2025-09-05 RX ORDER — ATROPINE SULFATE 0.4 MG/ML
0.5 INJECTION, SOLUTION INTRAVENOUS
Status: DISCONTINUED | OUTPATIENT
Start: 2025-09-05 | End: 2025-09-06 | Stop reason: HOSPADM

## 2025-09-05 RX ORDER — NITROGLYCERIN 0.4 MG/1
0.4 TABLET SUBLINGUAL EVERY 5 MIN PRN
Status: DISCONTINUED | OUTPATIENT
Start: 2025-09-05 | End: 2025-09-05 | Stop reason: HOSPADM

## 2025-09-05 RX ORDER — SODIUM CHLORIDE 0.9 % (FLUSH) 0.9 %
5-40 SYRINGE (ML) INJECTION PRN
Status: DISCONTINUED | OUTPATIENT
Start: 2025-09-05 | End: 2025-09-06 | Stop reason: HOSPADM

## 2025-09-05 RX ORDER — SODIUM CHLORIDE 9 MG/ML
INJECTION, SOLUTION INTRAVENOUS PRN
Status: DISCONTINUED | OUTPATIENT
Start: 2025-09-05 | End: 2025-09-06 | Stop reason: HOSPADM

## 2025-09-05 RX ORDER — IOPAMIDOL 755 MG/ML
80 INJECTION, SOLUTION INTRAVASCULAR
Status: COMPLETED | OUTPATIENT
Start: 2025-09-05 | End: 2025-09-05

## 2025-09-05 RX ORDER — SODIUM CHLORIDE 9 MG/ML
INJECTION, SOLUTION INTRAVENOUS CONTINUOUS
Status: ACTIVE | OUTPATIENT
Start: 2025-09-05 | End: 2025-09-05

## 2025-09-05 RX ORDER — ASPIRIN 325 MG
325 TABLET ORAL ONCE
Status: DISCONTINUED | OUTPATIENT
Start: 2025-09-05 | End: 2025-09-05 | Stop reason: HOSPADM

## 2025-09-05 RX ORDER — SODIUM CHLORIDE 0.9 % (FLUSH) 0.9 %
5-40 SYRINGE (ML) INJECTION PRN
Status: DISCONTINUED | OUTPATIENT
Start: 2025-09-05 | End: 2025-09-05 | Stop reason: HOSPADM

## 2025-09-05 RX ORDER — ACETAMINOPHEN 325 MG/1
650 TABLET ORAL EVERY 4 HOURS PRN
Status: DISCONTINUED | OUTPATIENT
Start: 2025-09-05 | End: 2025-09-06 | Stop reason: HOSPADM

## 2025-09-05 RX ORDER — SODIUM CHLORIDE 0.9 % (FLUSH) 0.9 %
5-40 SYRINGE (ML) INJECTION EVERY 12 HOURS SCHEDULED
Status: DISCONTINUED | OUTPATIENT
Start: 2025-09-05 | End: 2025-09-06 | Stop reason: HOSPADM

## 2025-09-05 RX ORDER — FENTANYL CITRATE 50 UG/ML
INJECTION, SOLUTION INTRAMUSCULAR; INTRAVENOUS PRN
Status: DISCONTINUED | OUTPATIENT
Start: 2025-09-05 | End: 2025-09-05 | Stop reason: HOSPADM

## 2025-09-05 RX ORDER — SODIUM CHLORIDE 9 MG/ML
INJECTION, SOLUTION INTRAVENOUS PRN
Status: DISCONTINUED | OUTPATIENT
Start: 2025-09-05 | End: 2025-09-05 | Stop reason: HOSPADM

## 2025-09-05 RX ORDER — LIDOCAINE HYDROCHLORIDE 20 MG/ML
INJECTION, SOLUTION EPIDURAL; INFILTRATION; INTRACAUDAL; PERINEURAL PRN
Status: DISCONTINUED | OUTPATIENT
Start: 2025-09-05 | End: 2025-09-05 | Stop reason: HOSPADM

## 2025-09-05 RX ORDER — SODIUM CHLORIDE 0.9 % (FLUSH) 0.9 %
5-40 SYRINGE (ML) INJECTION EVERY 12 HOURS SCHEDULED
Status: DISCONTINUED | OUTPATIENT
Start: 2025-09-05 | End: 2025-09-05 | Stop reason: HOSPADM

## 2025-09-05 RX ORDER — SODIUM CHLORIDE 9 MG/ML
INJECTION, SOLUTION INTRAVENOUS CONTINUOUS
Status: DISCONTINUED | OUTPATIENT
Start: 2025-09-05 | End: 2025-09-05 | Stop reason: HOSPADM

## 2025-09-05 RX ORDER — MIDAZOLAM HYDROCHLORIDE 1 MG/ML
INJECTION, SOLUTION INTRAMUSCULAR; INTRAVENOUS PRN
Status: DISCONTINUED | OUTPATIENT
Start: 2025-09-05 | End: 2025-09-05 | Stop reason: HOSPADM

## 2025-09-05 RX ORDER — IOPAMIDOL 755 MG/ML
INJECTION, SOLUTION INTRAVASCULAR PRN
Status: DISCONTINUED | OUTPATIENT
Start: 2025-09-05 | End: 2025-09-05 | Stop reason: HOSPADM

## 2025-09-05 RX ADMIN — SODIUM CHLORIDE: 9 INJECTION, SOLUTION INTRAVENOUS at 07:35

## 2025-09-05 RX ADMIN — IOPAMIDOL 80 ML: 755 INJECTION, SOLUTION INTRAVENOUS at 18:29

## 2025-09-05 RX ADMIN — SODIUM CHLORIDE, PRESERVATIVE FREE 10 ML: 5 INJECTION INTRAVENOUS at 20:04

## 2025-09-05 ASSESSMENT — PAIN SCALES - GENERAL
PAINLEVEL_OUTOF10: 0

## 2025-09-06 VITALS
WEIGHT: 164.68 LBS | OXYGEN SATURATION: 98 % | SYSTOLIC BLOOD PRESSURE: 151 MMHG | BODY MASS INDEX: 23.58 KG/M2 | HEART RATE: 63 BPM | TEMPERATURE: 97.6 F | DIASTOLIC BLOOD PRESSURE: 56 MMHG | HEIGHT: 70 IN | RESPIRATION RATE: 16 BRPM

## 2025-09-06 PROCEDURE — 6370000000 HC RX 637 (ALT 250 FOR IP): Performed by: NURSE PRACTITIONER

## 2025-09-06 PROCEDURE — 2500000003 HC RX 250 WO HCPCS: Performed by: INTERNAL MEDICINE

## 2025-09-06 RX ORDER — METOPROLOL SUCCINATE 25 MG/1
25 TABLET, EXTENDED RELEASE ORAL DAILY
Status: DISCONTINUED | OUTPATIENT
Start: 2025-09-06 | End: 2025-09-06 | Stop reason: HOSPADM

## 2025-09-06 RX ORDER — METOPROLOL SUCCINATE 25 MG/1
25 TABLET, EXTENDED RELEASE ORAL DAILY
Qty: 30 TABLET | Refills: 3 | Status: SHIPPED | OUTPATIENT
Start: 2025-09-06

## 2025-09-06 RX ADMIN — METOPROLOL SUCCINATE 25 MG: 25 TABLET, EXTENDED RELEASE ORAL at 10:40

## 2025-09-06 RX ADMIN — SODIUM CHLORIDE, PRESERVATIVE FREE 10 ML: 5 INJECTION INTRAVENOUS at 08:51

## 2025-09-06 ASSESSMENT — PAIN SCALES - GENERAL
PAINLEVEL_OUTOF10: 0
PAINLEVEL_OUTOF10: 0

## (undated) DEVICE — SUTURE SOFSILK SZ 1 L30IN NABSORBABLE BLK C-17 L39MM 3/8 SS646

## (undated) DEVICE — THORACIC CATHETER,STRAIGHT: Brand: ARGYLE

## (undated) DEVICE — PACK SUCT CATHETER 14FR OPN WHSTL W NO VLV

## (undated) DEVICE — DRAPE SLUSH DISC W44XL66IN ST FOR RND BSIN HUSH SLUSH SYS

## (undated) DEVICE — 4.0MM PRECISION ROUND

## (undated) DEVICE — ADHESIVE SKIN CLSR 0.7ML TOP DERMBND ADV

## (undated) DEVICE — SUTURE VCRL + SZ 0 L36IN ABSRB VLT L36MM CT-1 1/2 CIR VCP346H

## (undated) DEVICE — OPEN HRT CDS LF

## (undated) DEVICE — SUTURE VCRL + SZ 2-0 L27IN ABSRB CLR CT-1 1/2 CIR TAPERCUT VCP259H

## (undated) DEVICE — SUTURE VCRL + SZ 0 L27IN ABSRB VLT L36MM CT-1 1/2 CIR VCPB260H

## (undated) DEVICE — CODMAN® SURGICAL PATTIES 1" X 1" (2.54CM X 2.54CM): Brand: CODMAN®

## (undated) DEVICE — DRAIN SURG SGL COLL PT TB FOR ATS BG OASIS

## (undated) DEVICE — CANNULA PERF 15FR L12.5IN RG STPCOCK WIREWOUND BODY

## (undated) DEVICE — KIT EVAC 400CC PVC RADPQ Y CONN

## (undated) DEVICE — APPLICATOR MEDICATED 26 CC SOLUTION CLR STRL CHLORAPREP

## (undated) DEVICE — SUTURE PROL SZ 3-0 L36IN NONABSORBABLE BLU L26MM SH 1/2 CIR 8522H

## (undated) DEVICE — APPLIER CLP L9.38IN M LIG TI DISP STR RNG HNDL LIGACLP

## (undated) DEVICE — PROBE STIM 3 MM FOR PEDCL SCREW DISP

## (undated) DEVICE — BLANKET THER AD W24XL60IN FAB COVERING SUP SFT ULT THN LTWT

## (undated) DEVICE — LEAD PACE L475MM CHNL A OR V MYOCARDIAL STEROID ELUT SIL

## (undated) DEVICE — KIT VEN DRNGE VAC ACCSRY PERF VAVD STOCK W/ SPEC TRAP

## (undated) DEVICE — Device

## (undated) DEVICE — THORACIC CATHETER,RIGHT ANGLE: Brand: ARGYLE

## (undated) DEVICE — DECANTER FLD 9IN ST BG FOR ASEP TRNSF OF FLD

## (undated) DEVICE — APPLIER CLP L9.375IN APER 2.1MM CLS L3.8MM 20 SM TI CLP

## (undated) DEVICE — CONNECTOR PERF 3/8X3/8X3/8IN EQL WYE

## (undated) DEVICE — APPLIER LIG CLP M L11IN TI STR RNG HNDL FOR 20 CLP DISP

## (undated) DEVICE — SUTURE PROL 3-0 L36IN NONABSORBABLE BLU L26MM SH 1/2 CIR P8522

## (undated) DEVICE — BASIC SINGLE BASIN BTC-LF: Brand: MEDLINE INDUSTRIES, INC.

## (undated) DEVICE — BLOWER COR ART L16.5CM PLAS SHFT MAL W/ MIST IV SET AXIUS

## (undated) DEVICE — DRESSING TRNSPAR W8XL12IN FLM SURESITE 123

## (undated) DEVICE — KIT BLWR MISTER 5P 15L W/ TBNG SET IRRIG MIST TO IMPROVE

## (undated) DEVICE — SUTURE PROL 6-0 L24IN NONABSORBABLE BLU L13MM C-1 3/8 CIR M8726

## (undated) DEVICE — FOGARTY - HYDRAGRIP SURGICAL - CLAMP INSERTS: Brand: FOGARTY SOFTJAW

## (undated) DEVICE — HEAD POSITIONER, SURGICAL: Brand: DEROYAL

## (undated) DEVICE — Device: Brand: SUCTION TIP

## (undated) DEVICE — RETRACTOR SURG INSRT SUT HLD OCTOBASE

## (undated) DEVICE — GLOVE ORANGE PI 7 1/2   MSG9075

## (undated) DEVICE — CANNULA PVC RCSP 15FR SLD STYL W/ HNDL OVERALL LEN 11 IN

## (undated) DEVICE — SUTURE VCRL SZ 1 L18IN ABSRB VLT CTX L48MM 1/2 CIR J765D

## (undated) DEVICE — SUTURE SOFSILK SZ 2 L30IN NONABSORBABLE WHT V-26 L37MM 1/2 CS746

## (undated) DEVICE — TOTAL TRAY, DB, 100% SILI FOLEY, 16FR 10: Brand: MEDLINE

## (undated) DEVICE — PAD,NON-ADHERENT,3X8,STERILE,LF,1/PK: Brand: MEDLINE

## (undated) DEVICE — COVER US PRB W5XL96IN LTX W/ GEL

## (undated) DEVICE — SUTURE PROL SZ 4-0 L36IN NONABSORBABLE BLU L26MM SH 1/2 CIR 8521H

## (undated) DEVICE — Z DUPLICATE USE 2431315 SET INSUF TBNG HI FLO W/ SMK EVAC FOR PNEUMOCLEAR

## (undated) DEVICE — SUTURE MCRYL SZ 4-0 L27IN ABSRB UD L19MM PS-2 1/2 CIR PRIM Y426H

## (undated) DEVICE — SURGICAL PROCEDURE PACK OXGNTR ST MARYS

## (undated) DEVICE — CLIP LIG M TI 6 SIL HNDL FOR OPN AND ENDOSCP SGL APPL

## (undated) DEVICE — AGENT HEMOSTATIC SURGIFLOW MATRIX KIT W/THROMBIN

## (undated) DEVICE — THE MILL DISPOSABLE - MEDIUM

## (undated) DEVICE — GLOVE SURG SZ 85 L12IN FNGR ORTHO 126MIL CRM LTX FREE

## (undated) DEVICE — KIT PROC 1 ICG VI AQ SOLVNT DRP SPY ELITE

## (undated) DEVICE — DRAIN SURG 10FR PVC TB W/ TRCR MID PERF NO RESVR HUBLESS

## (undated) DEVICE — CELL SAVER TUBING

## (undated) DEVICE — SUTURE NONABSORBABLE MONOFILAMENT 4-0 RB-1 36 IN BLU PROLENE 8557H

## (undated) DEVICE — GOWN,SIRUS,NON REINFRCD,LARGE,SET IN SL: Brand: MEDLINE

## (undated) DEVICE — APPLICATOR MEDICATED 26 CC SOLUTION HI LT ORNG CHLORAPREP

## (undated) DEVICE — SYSTEM ENDOSCP VES HARV W/ TOOL CANN SEAL SHT PRT BLNT TIP

## (undated) DEVICE — PACK-MAJOR

## (undated) DEVICE — JCKSON TBL POSTNER NO HD REST: Brand: MEDLINE INDUSTRIES, INC.

## (undated) DEVICE — SUTURE VCRL + SZ 3-0 L27IN ABSRB WHT CT-1 1/2 CIR VCP258H

## (undated) DEVICE — SUTURE SZ 7 L18IN NONABSORBABLE SIL CCS L48MM 1/2 CIR STRNM M655G

## (undated) DEVICE — CELL SAVER PACK

## (undated) DEVICE — SUTURE PROL 7-0 L24IN NONABSORBABLE BLU L9.3MM CC 3/8 CIR M8704

## (undated) DEVICE — BIPOLAR SEALER 23-112-1 AQM 6.0: Brand: AQUAMANTYS ®

## (undated) DEVICE — DRESSING GRMCDL 6 12FR D1N CNTR HOLE 4MM ANTMCRBL PRTCTVE DI

## (undated) DEVICE — SUTURE SILK PERMAHAND PRECUT 6 X 30 IN SZ 1 BLK BRAID A307H

## (undated) DEVICE — SET AUTOTRNS C175ML BOWL BTM OUTLT RESERVOIRXTRA

## (undated) DEVICE — CLIP LIG SM TI 6 BLU HNDL FOR OPN AND ENDOSCP SGL APPL